# Patient Record
Sex: FEMALE | Race: OTHER | HISPANIC OR LATINO | URBAN - METROPOLITAN AREA
[De-identification: names, ages, dates, MRNs, and addresses within clinical notes are randomized per-mention and may not be internally consistent; named-entity substitution may affect disease eponyms.]

---

## 2023-01-01 ENCOUNTER — EMERGENCY (EMERGENCY)
Facility: HOSPITAL | Age: 63
LOS: 0 days | Discharge: HOME | End: 2023-12-27
Attending: EMERGENCY MEDICINE
Payer: MEDICARE

## 2023-01-01 VITALS
SYSTOLIC BLOOD PRESSURE: 120 MMHG | RESPIRATION RATE: 17 BRPM | TEMPERATURE: 98 F | DIASTOLIC BLOOD PRESSURE: 91 MMHG | OXYGEN SATURATION: 97 % | HEART RATE: 85 BPM

## 2023-01-01 VITALS
OXYGEN SATURATION: 94 % | HEART RATE: 96 BPM | SYSTOLIC BLOOD PRESSURE: 148 MMHG | DIASTOLIC BLOOD PRESSURE: 108 MMHG | RESPIRATION RATE: 20 BRPM

## 2023-01-01 DIAGNOSIS — R05.9 COUGH, UNSPECIFIED: ICD-10-CM

## 2023-01-01 DIAGNOSIS — R09.89 OTHER SPECIFIED SYMPTOMS AND SIGNS INVOLVING THE CIRCULATORY AND RESPIRATORY SYSTEMS: ICD-10-CM

## 2023-01-01 DIAGNOSIS — E78.5 HYPERLIPIDEMIA, UNSPECIFIED: ICD-10-CM

## 2023-01-01 DIAGNOSIS — G12.21 AMYOTROPHIC LATERAL SCLEROSIS: ICD-10-CM

## 2023-01-01 DIAGNOSIS — Z99.3 DEPENDENCE ON WHEELCHAIR: ICD-10-CM

## 2023-01-01 DIAGNOSIS — R11.10 VOMITING, UNSPECIFIED: ICD-10-CM

## 2023-01-01 LAB
ABO RH CONFIRMATION: SIGNIFICANT CHANGE UP
ABO RH CONFIRMATION: SIGNIFICANT CHANGE UP
ALBUMIN SERPL ELPH-MCNC: 3.9 G/DL — SIGNIFICANT CHANGE UP (ref 3.3–5)
ALBUMIN SERPL ELPH-MCNC: 3.9 G/DL — SIGNIFICANT CHANGE UP (ref 3.3–5)
ALP SERPL-CCNC: 65 U/L — SIGNIFICANT CHANGE UP (ref 40–120)
ALP SERPL-CCNC: 65 U/L — SIGNIFICANT CHANGE UP (ref 40–120)
ALT FLD-CCNC: 33 U/L — SIGNIFICANT CHANGE UP (ref 12–78)
ALT FLD-CCNC: 33 U/L — SIGNIFICANT CHANGE UP (ref 12–78)
ANION GAP SERPL CALC-SCNC: 2 MMOL/L — SIGNIFICANT CHANGE UP (ref 5–17)
ANION GAP SERPL CALC-SCNC: 2 MMOL/L — SIGNIFICANT CHANGE UP (ref 5–17)
APTT BLD: 34.1 SEC — SIGNIFICANT CHANGE UP (ref 24.5–35.6)
APTT BLD: 34.1 SEC — SIGNIFICANT CHANGE UP (ref 24.5–35.6)
AST SERPL-CCNC: 20 U/L — SIGNIFICANT CHANGE UP (ref 15–37)
AST SERPL-CCNC: 20 U/L — SIGNIFICANT CHANGE UP (ref 15–37)
BASOPHILS # BLD AUTO: 0.02 K/UL — SIGNIFICANT CHANGE UP (ref 0–0.2)
BASOPHILS # BLD AUTO: 0.02 K/UL — SIGNIFICANT CHANGE UP (ref 0–0.2)
BASOPHILS NFR BLD AUTO: 0.3 % — SIGNIFICANT CHANGE UP (ref 0–2)
BASOPHILS NFR BLD AUTO: 0.3 % — SIGNIFICANT CHANGE UP (ref 0–2)
BILIRUB SERPL-MCNC: 0.6 MG/DL — SIGNIFICANT CHANGE UP (ref 0.2–1.2)
BILIRUB SERPL-MCNC: 0.6 MG/DL — SIGNIFICANT CHANGE UP (ref 0.2–1.2)
BLD GP AB SCN SERPL QL: SIGNIFICANT CHANGE UP
BLD GP AB SCN SERPL QL: SIGNIFICANT CHANGE UP
BUN SERPL-MCNC: 21 MG/DL — SIGNIFICANT CHANGE UP (ref 7–23)
BUN SERPL-MCNC: 21 MG/DL — SIGNIFICANT CHANGE UP (ref 7–23)
CALCIUM SERPL-MCNC: 9.9 MG/DL — SIGNIFICANT CHANGE UP (ref 8.5–10.1)
CALCIUM SERPL-MCNC: 9.9 MG/DL — SIGNIFICANT CHANGE UP (ref 8.5–10.1)
CHLORIDE SERPL-SCNC: 109 MMOL/L — HIGH (ref 96–108)
CHLORIDE SERPL-SCNC: 109 MMOL/L — HIGH (ref 96–108)
CO2 SERPL-SCNC: 32 MMOL/L — HIGH (ref 22–31)
CO2 SERPL-SCNC: 32 MMOL/L — HIGH (ref 22–31)
CREAT SERPL-MCNC: 0.59 MG/DL — SIGNIFICANT CHANGE UP (ref 0.5–1.3)
CREAT SERPL-MCNC: 0.59 MG/DL — SIGNIFICANT CHANGE UP (ref 0.5–1.3)
EGFR: 102 ML/MIN/1.73M2 — SIGNIFICANT CHANGE UP
EGFR: 102 ML/MIN/1.73M2 — SIGNIFICANT CHANGE UP
EOSINOPHIL # BLD AUTO: 0.08 K/UL — SIGNIFICANT CHANGE UP (ref 0–0.5)
EOSINOPHIL # BLD AUTO: 0.08 K/UL — SIGNIFICANT CHANGE UP (ref 0–0.5)
EOSINOPHIL NFR BLD AUTO: 1.3 % — SIGNIFICANT CHANGE UP (ref 0–6)
EOSINOPHIL NFR BLD AUTO: 1.3 % — SIGNIFICANT CHANGE UP (ref 0–6)
GLUCOSE SERPL-MCNC: 105 MG/DL — HIGH (ref 70–99)
GLUCOSE SERPL-MCNC: 105 MG/DL — HIGH (ref 70–99)
HCT VFR BLD CALC: 42.1 % — SIGNIFICANT CHANGE UP (ref 34.5–45)
HCT VFR BLD CALC: 42.1 % — SIGNIFICANT CHANGE UP (ref 34.5–45)
HGB BLD-MCNC: 13.2 G/DL — SIGNIFICANT CHANGE UP (ref 11.5–15.5)
HGB BLD-MCNC: 13.2 G/DL — SIGNIFICANT CHANGE UP (ref 11.5–15.5)
IMM GRANULOCYTES NFR BLD AUTO: 0.3 % — SIGNIFICANT CHANGE UP (ref 0–0.9)
IMM GRANULOCYTES NFR BLD AUTO: 0.3 % — SIGNIFICANT CHANGE UP (ref 0–0.9)
INR BLD: 0.99 RATIO — SIGNIFICANT CHANGE UP (ref 0.85–1.18)
INR BLD: 0.99 RATIO — SIGNIFICANT CHANGE UP (ref 0.85–1.18)
LYMPHOCYTES # BLD AUTO: 1.04 K/UL — SIGNIFICANT CHANGE UP (ref 1–3.3)
LYMPHOCYTES # BLD AUTO: 1.04 K/UL — SIGNIFICANT CHANGE UP (ref 1–3.3)
LYMPHOCYTES # BLD AUTO: 16.9 % — SIGNIFICANT CHANGE UP (ref 13–44)
LYMPHOCYTES # BLD AUTO: 16.9 % — SIGNIFICANT CHANGE UP (ref 13–44)
MCHC RBC-ENTMCNC: 25.8 PG — LOW (ref 27–34)
MCHC RBC-ENTMCNC: 25.8 PG — LOW (ref 27–34)
MCHC RBC-ENTMCNC: 31.4 GM/DL — LOW (ref 32–36)
MCHC RBC-ENTMCNC: 31.4 GM/DL — LOW (ref 32–36)
MCV RBC AUTO: 82.2 FL — SIGNIFICANT CHANGE UP (ref 80–100)
MCV RBC AUTO: 82.2 FL — SIGNIFICANT CHANGE UP (ref 80–100)
MONOCYTES # BLD AUTO: 0.42 K/UL — SIGNIFICANT CHANGE UP (ref 0–0.9)
MONOCYTES # BLD AUTO: 0.42 K/UL — SIGNIFICANT CHANGE UP (ref 0–0.9)
MONOCYTES NFR BLD AUTO: 6.8 % — SIGNIFICANT CHANGE UP (ref 2–14)
MONOCYTES NFR BLD AUTO: 6.8 % — SIGNIFICANT CHANGE UP (ref 2–14)
NEUTROPHILS # BLD AUTO: 4.58 K/UL — SIGNIFICANT CHANGE UP (ref 1.8–7.4)
NEUTROPHILS # BLD AUTO: 4.58 K/UL — SIGNIFICANT CHANGE UP (ref 1.8–7.4)
NEUTROPHILS NFR BLD AUTO: 74.4 % — SIGNIFICANT CHANGE UP (ref 43–77)
NEUTROPHILS NFR BLD AUTO: 74.4 % — SIGNIFICANT CHANGE UP (ref 43–77)
PLATELET # BLD AUTO: 236 K/UL — SIGNIFICANT CHANGE UP (ref 150–400)
PLATELET # BLD AUTO: 236 K/UL — SIGNIFICANT CHANGE UP (ref 150–400)
POTASSIUM SERPL-MCNC: 4 MMOL/L — SIGNIFICANT CHANGE UP (ref 3.5–5.3)
POTASSIUM SERPL-MCNC: 4 MMOL/L — SIGNIFICANT CHANGE UP (ref 3.5–5.3)
POTASSIUM SERPL-SCNC: 4 MMOL/L — SIGNIFICANT CHANGE UP (ref 3.5–5.3)
POTASSIUM SERPL-SCNC: 4 MMOL/L — SIGNIFICANT CHANGE UP (ref 3.5–5.3)
PROT SERPL-MCNC: 7.6 GM/DL — SIGNIFICANT CHANGE UP (ref 6–8.3)
PROT SERPL-MCNC: 7.6 GM/DL — SIGNIFICANT CHANGE UP (ref 6–8.3)
PROTHROM AB SERPL-ACNC: 11.2 SEC — SIGNIFICANT CHANGE UP (ref 9.5–13)
PROTHROM AB SERPL-ACNC: 11.2 SEC — SIGNIFICANT CHANGE UP (ref 9.5–13)
RBC # BLD: 5.12 M/UL — SIGNIFICANT CHANGE UP (ref 3.8–5.2)
RBC # BLD: 5.12 M/UL — SIGNIFICANT CHANGE UP (ref 3.8–5.2)
RBC # FLD: 14.5 % — SIGNIFICANT CHANGE UP (ref 10.3–14.5)
RBC # FLD: 14.5 % — SIGNIFICANT CHANGE UP (ref 10.3–14.5)
SODIUM SERPL-SCNC: 142 MMOL/L — SIGNIFICANT CHANGE UP (ref 135–145)
SODIUM SERPL-SCNC: 142 MMOL/L — SIGNIFICANT CHANGE UP (ref 135–145)
WBC # BLD: 6.16 K/UL — SIGNIFICANT CHANGE UP (ref 3.8–10.5)
WBC # BLD: 6.16 K/UL — SIGNIFICANT CHANGE UP (ref 3.8–10.5)
WBC # FLD AUTO: 6.16 K/UL — SIGNIFICANT CHANGE UP (ref 3.8–10.5)
WBC # FLD AUTO: 6.16 K/UL — SIGNIFICANT CHANGE UP (ref 3.8–10.5)

## 2023-01-01 PROCEDURE — 70360 X-RAY EXAM OF NECK: CPT | Mod: 26

## 2023-01-01 PROCEDURE — 80053 COMPREHEN METABOLIC PANEL: CPT

## 2023-01-01 PROCEDURE — 71045 X-RAY EXAM CHEST 1 VIEW: CPT | Mod: 26

## 2023-01-01 PROCEDURE — 86901 BLOOD TYPING SEROLOGIC RH(D): CPT

## 2023-01-01 PROCEDURE — 85610 PROTHROMBIN TIME: CPT

## 2023-01-01 PROCEDURE — 85730 THROMBOPLASTIN TIME PARTIAL: CPT

## 2023-01-01 PROCEDURE — 71045 X-RAY EXAM CHEST 1 VIEW: CPT

## 2023-01-01 PROCEDURE — 86850 RBC ANTIBODY SCREEN: CPT

## 2023-01-01 PROCEDURE — 86900 BLOOD TYPING SEROLOGIC ABO: CPT

## 2023-01-01 PROCEDURE — 99285 EMERGENCY DEPT VISIT HI MDM: CPT

## 2023-01-01 PROCEDURE — 99284 EMERGENCY DEPT VISIT MOD MDM: CPT | Mod: 25

## 2023-01-01 PROCEDURE — 70490 CT SOFT TISSUE NECK W/O DYE: CPT | Mod: MA

## 2023-01-01 PROCEDURE — 70490 CT SOFT TISSUE NECK W/O DYE: CPT | Mod: 26,MA

## 2023-01-01 PROCEDURE — 85025 COMPLETE CBC W/AUTO DIFF WBC: CPT

## 2023-01-01 PROCEDURE — 36415 COLL VENOUS BLD VENIPUNCTURE: CPT

## 2023-01-01 PROCEDURE — 70360 X-RAY EXAM OF NECK: CPT

## 2023-01-01 RX ORDER — ONDANSETRON 8 MG/1
4 TABLET, FILM COATED ORAL ONCE
Refills: 0 | Status: COMPLETED | OUTPATIENT
Start: 2023-01-01 | End: 2023-01-01

## 2023-12-27 NOTE — ED PROVIDER NOTE - CLINICAL SUMMARY MEDICAL DECISION MAKING FREE TEXT BOX
64 yo female with a PMH of hld, ALS presents with choking episode approx 1 hour PTA. Pt has coughed and vomited after the episode but states she still feels a sensation in her throat. Has not have anything to eat or drink after.   Will check XR, labs, and meds, reeval. -Slava Alarcon PA-C 64 yo female with a PMH of hld, ALS presents with choking episode approx 1 hour PTA. Pt has coughed and vomited after the episode but states she still feels a sensation in her throat. Has not have anything to eat or drink after.   Will check XR, labs, and meds, reeval. -Slava Alarcon PA-C    In summary this is a 63-year-old female with a past medical history of ALS who presents with chief complaint of choking episode. Vital signs are within normal limits on arrival. Chest x-ray was performed and independently interpreted by myself to reveal atelectasis soft tissue x-ray of the neck followed by CT scan of the neck demonstrated no abnormalities at this time. The patient tolerated po well in the department without difficulty. Patient requested social work evaluation which was completed in the Emergency Department. Patient was discharged home in good condition. Recommend follow up with her ALS Clinic and her PCP. Recommended cough assist machine as patient had weak cough secondary to ALS. Strict return precautions given for any worsening. Patient verbalized understanding and agreed to plan at this time. Pritesh Coley D.O. 62 yo female with a PMH of hld, ALS presents with choking episode approx 1 hour PTA. Pt has coughed and vomited after the episode but states she still feels a sensation in her throat. Has not have anything to eat or drink after.   Will check XR, labs, and meds, reeval. -Slava Alarcon PA-C    In summary this is a 63-year-old female with a past medical history of ALS who presents with chief complaint of choking episode. Vital signs are within normal limits on arrival. Chest x-ray was performed and independently interpreted by myself to reveal atelectasis soft tissue x-ray of the neck followed by CT scan of the neck demonstrated no abnormalities at this time. The patient tolerated po well in the department without difficulty. Patient requested social work evaluation which was completed in the Emergency Department. Patient was discharged home in good condition. Recommend follow up with her ALS Clinic and her PCP. Recommended cough assist machine as patient had weak cough secondary to ALS. Strict return precautions given for any worsening. Patient verbalized understanding and agreed to plan at this time. Pritesh Coley D.O.

## 2023-12-27 NOTE — ED PROVIDER NOTE - OBJECTIVE STATEMENT
64 yo female with a PMH of hld, ALS (wheelchair bound) presents with choking episode approx 1 hour PTA> Pt was eating left over steak when she started choking. Pt was coughing and had vomiting episodes but states she still feels pressure in her throat. Denies cp, sob, abd pain. 62 yo female with a PMH of hld, ALS (wheelchair bound) presents with choking episode approx 1 hour PTA> Pt was eating left over steak when she started choking. Pt was coughing and had vomiting episodes but states she still feels pressure in her throat. Denies cp, sob, abd pain.

## 2023-12-27 NOTE — ED PROVIDER NOTE - PROGRESS NOTE DETAILS
Pt feeling better. Sensation has resolved. CT without evidence of food impaction. PO trail given and tolerated. Pt wanted to speak with SW. Lorraine came and gave her information for home help. WIll d/c home. p

## 2023-12-27 NOTE — ED ADULT TRIAGE NOTE - CHIEF COMPLAINT QUOTE
BIBA s/o episode of choking on steak. feels like there is still something stuck. has been vomiting o2 sat 94 on room air.

## 2023-12-27 NOTE — ED PROVIDER NOTE - PATIENT PORTAL LINK FT
You can access the FollowMyHealth Patient Portal offered by [facility] by registering at the following website: [email or web address]. By joining [employer]’s FollowMyHealth portal, you will also be able to view your health information using other applications (apps) compatible with our system. You can access the FollowMyHealth Patient Portal offered by [facility] by registering at the following website: [email or web address]. By joining [employer]’s FollowMyHealth portal, you will also be able to view your health information using other applications (apps) compatible with our system.

## 2023-12-27 NOTE — ED ADULT NURSE NOTE - NSFALLRISKINTERV_ED_ALL_ED
Assistance OOB with selected safe patient handling equipment if applicable/Assistance with ambulation/Communicate fall risk and risk factors to all staff, patient, and family/Monitor gait and stability/Provide patient with walking aids/Provide visual cue: yellow wristband, yellow gown, etc/Reinforce activity limits and safety measures with patient and family/Call bell, personal items and telephone in reach/Instruct patient to call for assistance before getting out of bed/chair/stretcher/Non-slip footwear applied when patient is off stretcher/[place] to call system/Physically safe environment - no spills, clutter or unnecessary equipment/Purposeful Proactive Rounding/Room/bathroom lighting operational, light cord in reach Assistance OOB with selected safe patient handling equipment if applicable/Assistance with ambulation/Communicate fall risk and risk factors to all staff, patient, and family/Monitor gait and stability/Provide patient with walking aids/Provide visual cue: yellow wristband, yellow gown, etc/Reinforce activity limits and safety measures with patient and family/Call bell, personal items and telephone in reach/Instruct patient to call for assistance before getting out of bed/chair/stretcher/Non-slip footwear applied when patient is off stretcher/[place] to call system/Physically safe environment - no spills, clutter or unnecessary equipment/Purposeful Proactive Rounding/Room/bathroom lighting operational, light cord in reach

## 2023-12-27 NOTE — ED ADULT NURSE NOTE - OBJECTIVE STATEMENT
Pt arrived to ED s/p choking. Pt has a history of ALS diagnosed in January, Pt states she has been having some choking episodes lately and today while she was trying to eat steak she started choking and coughing. Pt states she began throwing up from the coughing and has been throwing up since. Pt states she still feels some pressure in her throat but no difficulties with breathing. Pt AOx4, denies chest pain, sob, or fevers.

## 2023-12-27 NOTE — ED PROVIDER NOTE - NSFOLLOWUPINSTRUCTIONS_ED_ALL_ED_FT
Follow up with your primary care doctor.   Make sure you chew your food fully and avoid swallowing large or hard pieces.   Drink plenty of fluids.    Return to the Emergency Department for worsening or persistent symptoms, and/or ANY NEW OR CONCERNING SYMPTOMS. If you have issues obtaining follow up, please call: [phone] ([phone] or [phone]  to obtain a doctor or specialist who takes your insurance in your area. Follow up with your primary care doctor.   Make sure you chew your food fully and avoid swallowing large or hard pieces.   Drink plenty of fluids.    Return to the Emergency Department for worsening or persistent symptoms, and/or ANY NEW OR CONCERNING SYMPTOMS. If you have issues obtaining follow up, please call: [phone] ([phone] or [phone]  to obtain a doctor or specialist who takes your insurance in your area.

## 2023-12-27 NOTE — ED PROVIDER NOTE - CONSTITUTIONAL, MLM
Tearful appearing, awake, alert, oriented to person, place, time/situation and in no apparent distress. normal...

## 2023-12-27 NOTE — ED PROVIDER NOTE - NS ED ATTENDING STATEMENT MOD
This was a shared visit with the SAMUEL. I reviewed and verified the documentation and independently performed the documented:

## 2024-01-01 ENCOUNTER — INPATIENT (INPATIENT)
Facility: HOSPITAL | Age: 64
LOS: 11 days | DRG: 102 | End: 2024-05-10
Attending: INTERNAL MEDICINE | Admitting: INTERNAL MEDICINE
Payer: MEDICARE

## 2024-01-01 ENCOUNTER — INPATIENT (INPATIENT)
Facility: HOSPITAL | Age: 64
LOS: 7 days | Discharge: NURSING, REHAB OR LONG-TERM CARE | DRG: 56 | End: 2024-03-20
Attending: HOSPITALIST | Admitting: FAMILY MEDICINE
Payer: MEDICARE

## 2024-01-01 ENCOUNTER — EMERGENCY (EMERGENCY)
Facility: HOSPITAL | Age: 64
LOS: 0 days | Discharge: HOME | End: 2024-03-08
Attending: EMERGENCY MEDICINE
Payer: MEDICARE

## 2024-01-01 VITALS
DIASTOLIC BLOOD PRESSURE: 80 MMHG | HEIGHT: 65 IN | OXYGEN SATURATION: 97 % | SYSTOLIC BLOOD PRESSURE: 134 MMHG | TEMPERATURE: 98 F | HEART RATE: 96 BPM | RESPIRATION RATE: 18 BRPM | WEIGHT: 145.06 LBS

## 2024-01-01 VITALS
HEART RATE: 100 BPM | SYSTOLIC BLOOD PRESSURE: 117 MMHG | TEMPERATURE: 99 F | RESPIRATION RATE: 18 BRPM | DIASTOLIC BLOOD PRESSURE: 88 MMHG | OXYGEN SATURATION: 96 %

## 2024-01-01 VITALS
HEART RATE: 98 BPM | HEIGHT: 65 IN | SYSTOLIC BLOOD PRESSURE: 123 MMHG | TEMPERATURE: 98 F | OXYGEN SATURATION: 99 % | DIASTOLIC BLOOD PRESSURE: 89 MMHG | RESPIRATION RATE: 18 BRPM | WEIGHT: 160.06 LBS

## 2024-01-01 VITALS
TEMPERATURE: 98 F | SYSTOLIC BLOOD PRESSURE: 109 MMHG | HEART RATE: 87 BPM | OXYGEN SATURATION: 94 % | DIASTOLIC BLOOD PRESSURE: 86 MMHG | RESPIRATION RATE: 18 BRPM

## 2024-01-01 VITALS
HEIGHT: 65 IN | OXYGEN SATURATION: 90 % | TEMPERATURE: 98 F | RESPIRATION RATE: 18 BRPM | DIASTOLIC BLOOD PRESSURE: 94 MMHG | WEIGHT: 160.06 LBS | SYSTOLIC BLOOD PRESSURE: 120 MMHG | HEART RATE: 90 BPM

## 2024-01-01 VITALS — TEMPERATURE: 101 F

## 2024-01-01 DIAGNOSIS — R07.89 OTHER CHEST PAIN: ICD-10-CM

## 2024-01-01 DIAGNOSIS — F41.9 ANXIETY DISORDER, UNSPECIFIED: ICD-10-CM

## 2024-01-01 DIAGNOSIS — Z66 DO NOT RESUSCITATE: ICD-10-CM

## 2024-01-01 DIAGNOSIS — J96.02 ACUTE RESPIRATORY FAILURE WITH HYPERCAPNIA: ICD-10-CM

## 2024-01-01 DIAGNOSIS — Z99.3 DEPENDENCE ON WHEELCHAIR: ICD-10-CM

## 2024-01-01 DIAGNOSIS — Z20.822 CONTACT WITH AND (SUSPECTED) EXPOSURE TO COVID-19: ICD-10-CM

## 2024-01-01 DIAGNOSIS — E87.29 OTHER ACIDOSIS: ICD-10-CM

## 2024-01-01 DIAGNOSIS — E78.5 HYPERLIPIDEMIA, UNSPECIFIED: ICD-10-CM

## 2024-01-01 DIAGNOSIS — R53.1 WEAKNESS: ICD-10-CM

## 2024-01-01 DIAGNOSIS — G12.21 AMYOTROPHIC LATERAL SCLEROSIS: ICD-10-CM
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
## 2024-01-01 DIAGNOSIS — Z91.81 HISTORY OF FALLING: ICD-10-CM

## 2024-01-01 DIAGNOSIS — G47.00 INSOMNIA, UNSPECIFIED: ICD-10-CM
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
## 2024-01-01 DIAGNOSIS — E87.3 ALKALOSIS: ICD-10-CM

## 2024-01-01 DIAGNOSIS — J98.4 OTHER DISORDERS OF LUNG: ICD-10-CM

## 2024-01-01 DIAGNOSIS — M54.2 CERVICALGIA: ICD-10-CM

## 2024-01-01 DIAGNOSIS — F32.9 MAJOR DEPRESSIVE DISORDER, SINGLE EPISODE, UNSPECIFIED: ICD-10-CM

## 2024-01-01 DIAGNOSIS — J96.21 ACUTE AND CHRONIC RESPIRATORY FAILURE WITH HYPOXIA: ICD-10-CM

## 2024-01-01 DIAGNOSIS — J96.22 ACUTE AND CHRONIC RESPIRATORY FAILURE WITH HYPERCAPNIA: ICD-10-CM

## 2024-01-01 DIAGNOSIS — R11.0 NAUSEA: ICD-10-CM

## 2024-01-01 DIAGNOSIS — Z75.1 PERSON AWAITING ADMISSION TO ADEQUATE FACILITY ELSEWHERE: ICD-10-CM

## 2024-01-01 DIAGNOSIS — G93.41 METABOLIC ENCEPHALOPATHY: ICD-10-CM

## 2024-01-01 LAB
ALBUMIN SERPL ELPH-MCNC: 3.2 G/DL — LOW (ref 3.3–5)
ALBUMIN SERPL ELPH-MCNC: 3.4 G/DL — SIGNIFICANT CHANGE UP (ref 3.3–5)
ALBUMIN SERPL ELPH-MCNC: 3.5 G/DL — SIGNIFICANT CHANGE UP (ref 3.3–5)
ALBUMIN SERPL ELPH-MCNC: 3.6 G/DL — SIGNIFICANT CHANGE UP (ref 3.3–5)
ALP SERPL-CCNC: 53 U/L — SIGNIFICANT CHANGE UP (ref 40–120)
ALP SERPL-CCNC: 54 U/L — SIGNIFICANT CHANGE UP (ref 40–120)
ALP SERPL-CCNC: 61 U/L — SIGNIFICANT CHANGE UP (ref 40–120)
ALP SERPL-CCNC: 66 U/L — SIGNIFICANT CHANGE UP (ref 40–120)
ALT FLD-CCNC: 36 U/L — SIGNIFICANT CHANGE UP (ref 12–78)
ALT FLD-CCNC: 37 U/L — SIGNIFICANT CHANGE UP (ref 12–78)
ALT FLD-CCNC: 39 U/L — SIGNIFICANT CHANGE UP (ref 12–78)
ALT FLD-CCNC: 42 U/L — SIGNIFICANT CHANGE UP (ref 12–78)
AMMONIA BLD-MCNC: <10 UMOL/L — LOW (ref 11–32)
ANION GAP SERPL CALC-SCNC: 0 MMOL/L — LOW (ref 5–17)
ANION GAP SERPL CALC-SCNC: 0 MMOL/L — LOW (ref 5–17)
ANION GAP SERPL CALC-SCNC: 1 MMOL/L — LOW (ref 5–17)
ANION GAP SERPL CALC-SCNC: 2 MMOL/L — LOW (ref 5–17)
ANION GAP SERPL CALC-SCNC: 3 MMOL/L — LOW (ref 5–17)
ANION GAP SERPL CALC-SCNC: 6 MMOL/L — SIGNIFICANT CHANGE UP (ref 5–17)
APTT BLD: 25.1 SEC — SIGNIFICANT CHANGE UP (ref 24.5–35.6)
AST SERPL-CCNC: 14 U/L — LOW (ref 15–37)
AST SERPL-CCNC: 17 U/L — SIGNIFICANT CHANGE UP (ref 15–37)
AST SERPL-CCNC: 18 U/L — SIGNIFICANT CHANGE UP (ref 15–37)
AST SERPL-CCNC: 19 U/L — SIGNIFICANT CHANGE UP (ref 15–37)
BASE EXCESS BLDA CALC-SCNC: 10.8 MMOL/L — HIGH (ref -2–3)
BASE EXCESS BLDA CALC-SCNC: 11.7 MMOL/L — HIGH (ref -2–3)
BASE EXCESS BLDA CALC-SCNC: 12.1 MMOL/L — HIGH (ref -2–3)
BASE EXCESS BLDA CALC-SCNC: 12.2 MMOL/L — HIGH (ref -2–3)
BASE EXCESS BLDA CALC-SCNC: 14 MMOL/L — HIGH (ref -2–3)
BASE EXCESS BLDA CALC-SCNC: 15.8 MMOL/L — HIGH (ref -2–3)
BASE EXCESS BLDA CALC-SCNC: 8.4 MMOL/L — HIGH (ref -2–3)
BASE EXCESS BLDV CALC-SCNC: 10.5 MMOL/L — HIGH (ref -2–3)
BASE EXCESS BLDV CALC-SCNC: 16.1 MMOL/L — HIGH (ref -2–3)
BASE EXCESS BLDV CALC-SCNC: 8.3 MMOL/L — HIGH (ref -2–3)
BASE EXCESS BLDV CALC-SCNC: 9.4 MMOL/L — HIGH (ref -2–3)
BASOPHILS # BLD AUTO: 0.01 K/UL — SIGNIFICANT CHANGE UP (ref 0–0.2)
BASOPHILS # BLD AUTO: 0.01 K/UL — SIGNIFICANT CHANGE UP (ref 0–0.2)
BASOPHILS # BLD AUTO: 0.02 K/UL — SIGNIFICANT CHANGE UP (ref 0–0.2)
BASOPHILS # BLD AUTO: 0.02 K/UL — SIGNIFICANT CHANGE UP (ref 0–0.2)
BASOPHILS # BLD AUTO: 0.03 K/UL — SIGNIFICANT CHANGE UP (ref 0–0.2)
BASOPHILS # BLD AUTO: 0.03 K/UL — SIGNIFICANT CHANGE UP (ref 0–0.2)
BASOPHILS NFR BLD AUTO: 0.1 % — SIGNIFICANT CHANGE UP (ref 0–2)
BASOPHILS NFR BLD AUTO: 0.1 % — SIGNIFICANT CHANGE UP (ref 0–2)
BASOPHILS NFR BLD AUTO: 0.2 % — SIGNIFICANT CHANGE UP (ref 0–2)
BASOPHILS NFR BLD AUTO: 0.3 % — SIGNIFICANT CHANGE UP (ref 0–2)
BILIRUB DIRECT SERPL-MCNC: 0.1 MG/DL — SIGNIFICANT CHANGE UP (ref 0–0.3)
BILIRUB INDIRECT FLD-MCNC: 0.4 MG/DL — SIGNIFICANT CHANGE UP (ref 0.2–1)
BILIRUB SERPL-MCNC: 0.3 MG/DL — SIGNIFICANT CHANGE UP (ref 0.2–1.2)
BILIRUB SERPL-MCNC: 0.3 MG/DL — SIGNIFICANT CHANGE UP (ref 0.2–1.2)
BILIRUB SERPL-MCNC: 0.4 MG/DL — SIGNIFICANT CHANGE UP (ref 0.2–1.2)
BILIRUB SERPL-MCNC: 0.5 MG/DL — SIGNIFICANT CHANGE UP (ref 0.2–1.2)
BLOOD GAS COMMENTS ARTERIAL: SIGNIFICANT CHANGE UP
BUN SERPL-MCNC: 11 MG/DL — SIGNIFICANT CHANGE UP (ref 7–23)
BUN SERPL-MCNC: 11 MG/DL — SIGNIFICANT CHANGE UP (ref 7–23)
BUN SERPL-MCNC: 12 MG/DL — SIGNIFICANT CHANGE UP (ref 7–23)
BUN SERPL-MCNC: 12 MG/DL — SIGNIFICANT CHANGE UP (ref 7–23)
BUN SERPL-MCNC: 13 MG/DL — SIGNIFICANT CHANGE UP (ref 7–23)
BUN SERPL-MCNC: 13 MG/DL — SIGNIFICANT CHANGE UP (ref 7–23)
BUN SERPL-MCNC: 14 MG/DL — SIGNIFICANT CHANGE UP (ref 7–23)
BUN SERPL-MCNC: 17 MG/DL — SIGNIFICANT CHANGE UP (ref 7–23)
BUN SERPL-MCNC: 19 MG/DL — SIGNIFICANT CHANGE UP (ref 7–23)
BUN SERPL-MCNC: 25 MG/DL — HIGH (ref 7–23)
BUN SERPL-MCNC: 28 MG/DL — HIGH (ref 7–23)
BUN SERPL-MCNC: 28 MG/DL — HIGH (ref 7–23)
CALCIUM SERPL-MCNC: 10 MG/DL — SIGNIFICANT CHANGE UP (ref 8.5–10.1)
CALCIUM SERPL-MCNC: 10.3 MG/DL — HIGH (ref 8.5–10.1)
CALCIUM SERPL-MCNC: 9.1 MG/DL — SIGNIFICANT CHANGE UP (ref 8.5–10.1)
CALCIUM SERPL-MCNC: 9.2 MG/DL — SIGNIFICANT CHANGE UP (ref 8.5–10.1)
CALCIUM SERPL-MCNC: 9.3 MG/DL — SIGNIFICANT CHANGE UP (ref 8.5–10.1)
CALCIUM SERPL-MCNC: 9.5 MG/DL — SIGNIFICANT CHANGE UP (ref 8.5–10.1)
CALCIUM SERPL-MCNC: 9.5 MG/DL — SIGNIFICANT CHANGE UP (ref 8.5–10.1)
CALCIUM SERPL-MCNC: 9.6 MG/DL — SIGNIFICANT CHANGE UP (ref 8.5–10.1)
CALCIUM SERPL-MCNC: 9.7 MG/DL — SIGNIFICANT CHANGE UP (ref 8.5–10.1)
CHLORIDE SERPL-SCNC: 100 MMOL/L — SIGNIFICANT CHANGE UP (ref 96–108)
CHLORIDE SERPL-SCNC: 100 MMOL/L — SIGNIFICANT CHANGE UP (ref 96–108)
CHLORIDE SERPL-SCNC: 101 MMOL/L — SIGNIFICANT CHANGE UP (ref 96–108)
CHLORIDE SERPL-SCNC: 101 MMOL/L — SIGNIFICANT CHANGE UP (ref 96–108)
CHLORIDE SERPL-SCNC: 102 MMOL/L — SIGNIFICANT CHANGE UP (ref 96–108)
CHLORIDE SERPL-SCNC: 102 MMOL/L — SIGNIFICANT CHANGE UP (ref 96–108)
CHLORIDE SERPL-SCNC: 103 MMOL/L — SIGNIFICANT CHANGE UP (ref 96–108)
CHLORIDE SERPL-SCNC: 104 MMOL/L — SIGNIFICANT CHANGE UP (ref 96–108)
CHLORIDE SERPL-SCNC: 105 MMOL/L — SIGNIFICANT CHANGE UP (ref 96–108)
CHLORIDE SERPL-SCNC: 106 MMOL/L — SIGNIFICANT CHANGE UP (ref 96–108)
CHLORIDE SERPL-SCNC: 106 MMOL/L — SIGNIFICANT CHANGE UP (ref 96–108)
CHLORIDE SERPL-SCNC: 99 MMOL/L — SIGNIFICANT CHANGE UP (ref 96–108)
CO2 SERPL-SCNC: 34 MMOL/L — HIGH (ref 22–31)
CO2 SERPL-SCNC: 35 MMOL/L — HIGH (ref 22–31)
CO2 SERPL-SCNC: 35 MMOL/L — HIGH (ref 22–31)
CO2 SERPL-SCNC: 36 MMOL/L — HIGH (ref 22–31)
CO2 SERPL-SCNC: 36 MMOL/L — HIGH (ref 22–31)
CO2 SERPL-SCNC: 37 MMOL/L — HIGH (ref 22–31)
CO2 SERPL-SCNC: 38 MMOL/L — HIGH (ref 22–31)
CO2 SERPL-SCNC: 39 MMOL/L — HIGH (ref 22–31)
CO2 SERPL-SCNC: 41 MMOL/L — HIGH (ref 22–31)
CO2 SERPL-SCNC: 42 MMOL/L — HIGH (ref 22–31)
CREAT SERPL-MCNC: 0.17 MG/DL — LOW (ref 0.5–1.3)
CREAT SERPL-MCNC: 0.17 MG/DL — LOW (ref 0.5–1.3)
CREAT SERPL-MCNC: 0.18 MG/DL — LOW (ref 0.5–1.3)
CREAT SERPL-MCNC: 0.25 MG/DL — LOW (ref 0.5–1.3)
CREAT SERPL-MCNC: 0.31 MG/DL — LOW (ref 0.5–1.3)
CREAT SERPL-MCNC: 0.33 MG/DL — LOW (ref 0.5–1.3)
CREAT SERPL-MCNC: 0.35 MG/DL — LOW (ref 0.5–1.3)
CREAT SERPL-MCNC: 0.48 MG/DL — LOW (ref 0.5–1.3)
CREAT SERPL-MCNC: 0.6 MG/DL — SIGNIFICANT CHANGE UP (ref 0.5–1.3)
CREAT SERPL-MCNC: <0.15 MG/DL — LOW (ref 0.5–1.3)
D DIMER BLD IA.RAPID-MCNC: 184 NG/ML DDU — SIGNIFICANT CHANGE UP
EGFR: 102 ML/MIN/1.73M2 — SIGNIFICANT CHANGE UP
EGFR: 108 ML/MIN/1.73M2 — SIGNIFICANT CHANGE UP
EGFR: 116 ML/MIN/1.73M2 — SIGNIFICANT CHANGE UP
EGFR: 118 ML/MIN/1.73M2 — SIGNIFICANT CHANGE UP
EGFR: 120 ML/MIN/1.73M2 — SIGNIFICANT CHANGE UP
EGFR: 126 ML/MIN/1.73M2 — SIGNIFICANT CHANGE UP
EGFR: 136 ML/MIN/1.73M2 — SIGNIFICANT CHANGE UP
EGFR: 138 ML/MIN/1.73M2 — SIGNIFICANT CHANGE UP
EGFR: 138 ML/MIN/1.73M2 — SIGNIFICANT CHANGE UP
EGFR: 143 ML/MIN/1.73M2 — SIGNIFICANT CHANGE UP
EOSINOPHIL # BLD AUTO: 0 K/UL — SIGNIFICANT CHANGE UP (ref 0–0.5)
EOSINOPHIL # BLD AUTO: 0 K/UL — SIGNIFICANT CHANGE UP (ref 0–0.5)
EOSINOPHIL # BLD AUTO: 0.01 K/UL — SIGNIFICANT CHANGE UP (ref 0–0.5)
EOSINOPHIL # BLD AUTO: 0.04 K/UL — SIGNIFICANT CHANGE UP (ref 0–0.5)
EOSINOPHIL # BLD AUTO: 0.06 K/UL — SIGNIFICANT CHANGE UP (ref 0–0.5)
EOSINOPHIL # BLD AUTO: 0.09 K/UL — SIGNIFICANT CHANGE UP (ref 0–0.5)
EOSINOPHIL NFR BLD AUTO: 0 % — SIGNIFICANT CHANGE UP (ref 0–6)
EOSINOPHIL NFR BLD AUTO: 0 % — SIGNIFICANT CHANGE UP (ref 0–6)
EOSINOPHIL NFR BLD AUTO: 0.1 % — SIGNIFICANT CHANGE UP (ref 0–6)
EOSINOPHIL NFR BLD AUTO: 0.5 % — SIGNIFICANT CHANGE UP (ref 0–6)
EOSINOPHIL NFR BLD AUTO: 0.7 % — SIGNIFICANT CHANGE UP (ref 0–6)
EOSINOPHIL NFR BLD AUTO: 1 % — SIGNIFICANT CHANGE UP (ref 0–6)
FLUAV AG NPH QL: SIGNIFICANT CHANGE UP
FLUBV AG NPH QL: SIGNIFICANT CHANGE UP
GAS PNL BLDA: SIGNIFICANT CHANGE UP
GAS PNL BLDV: SIGNIFICANT CHANGE UP
GAS PNL BLDV: SIGNIFICANT CHANGE UP
GLUCOSE SERPL-MCNC: 102 MG/DL — HIGH (ref 70–99)
GLUCOSE SERPL-MCNC: 113 MG/DL — HIGH (ref 70–99)
GLUCOSE SERPL-MCNC: 115 MG/DL — HIGH (ref 70–99)
GLUCOSE SERPL-MCNC: 116 MG/DL — HIGH (ref 70–99)
GLUCOSE SERPL-MCNC: 117 MG/DL — HIGH (ref 70–99)
GLUCOSE SERPL-MCNC: 119 MG/DL — HIGH (ref 70–99)
GLUCOSE SERPL-MCNC: 123 MG/DL — HIGH (ref 70–99)
GLUCOSE SERPL-MCNC: 125 MG/DL — HIGH (ref 70–99)
GLUCOSE SERPL-MCNC: 127 MG/DL — HIGH (ref 70–99)
GLUCOSE SERPL-MCNC: 129 MG/DL — HIGH (ref 70–99)
GLUCOSE SERPL-MCNC: 136 MG/DL — HIGH (ref 70–99)
GLUCOSE SERPL-MCNC: 97 MG/DL — SIGNIFICANT CHANGE UP (ref 70–99)
HCO3 BLDA-SCNC: 38 MMOL/L — HIGH (ref 21–28)
HCO3 BLDA-SCNC: 42 MMOL/L — HIGH (ref 21–28)
HCO3 BLDA-SCNC: 42 MMOL/L — HIGH (ref 21–28)
HCO3 BLDA-SCNC: 44 MMOL/L — HIGH (ref 21–28)
HCO3 BLDA-SCNC: 45 MMOL/L — CRITICAL HIGH (ref 21–28)
HCO3 BLDV-SCNC: 35 MMOL/L — HIGH (ref 22–29)
HCO3 BLDV-SCNC: 39 MMOL/L — HIGH (ref 22–29)
HCO3 BLDV-SCNC: 39 MMOL/L — HIGH (ref 22–29)
HCO3 BLDV-SCNC: 46 MMOL/L — HIGH (ref 22–29)
HCT VFR BLD CALC: 36.8 % — SIGNIFICANT CHANGE UP (ref 34.5–45)
HCT VFR BLD CALC: 39 % — SIGNIFICANT CHANGE UP (ref 34.5–45)
HCT VFR BLD CALC: 39.5 % — SIGNIFICANT CHANGE UP (ref 34.5–45)
HCT VFR BLD CALC: 40.4 % — SIGNIFICANT CHANGE UP (ref 34.5–45)
HCT VFR BLD CALC: 40.6 % — SIGNIFICANT CHANGE UP (ref 34.5–45)
HCT VFR BLD CALC: 40.8 % — SIGNIFICANT CHANGE UP (ref 34.5–45)
HCT VFR BLD CALC: 44.1 % — SIGNIFICANT CHANGE UP (ref 34.5–45)
HCT VFR BLD CALC: 44.2 % — SIGNIFICANT CHANGE UP (ref 34.5–45)
HCT VFR BLD CALC: 44.4 % — SIGNIFICANT CHANGE UP (ref 34.5–45)
HCT VFR BLD CALC: 44.6 % — SIGNIFICANT CHANGE UP (ref 34.5–45)
HCV AB S/CO SERPL IA: 0.11 S/CO — SIGNIFICANT CHANGE UP (ref 0–0.99)
HCV AB SERPL-IMP: SIGNIFICANT CHANGE UP
HGB BLD-MCNC: 11 G/DL — LOW (ref 11.5–15.5)
HGB BLD-MCNC: 11.1 G/DL — LOW (ref 11.5–15.5)
HGB BLD-MCNC: 11.2 G/DL — LOW (ref 11.5–15.5)
HGB BLD-MCNC: 11.3 G/DL — LOW (ref 11.5–15.5)
HGB BLD-MCNC: 11.4 G/DL — LOW (ref 11.5–15.5)
HGB BLD-MCNC: 11.6 G/DL — SIGNIFICANT CHANGE UP (ref 11.5–15.5)
HGB BLD-MCNC: 12.8 G/DL — SIGNIFICANT CHANGE UP (ref 11.5–15.5)
HGB BLD-MCNC: 13.3 G/DL — SIGNIFICANT CHANGE UP (ref 11.5–15.5)
HGB BLD-MCNC: 13.5 G/DL — SIGNIFICANT CHANGE UP (ref 11.5–15.5)
HGB BLD-MCNC: 13.6 G/DL — SIGNIFICANT CHANGE UP (ref 11.5–15.5)
HOROWITZ INDEX BLDA+IHG-RTO: 28 — SIGNIFICANT CHANGE UP
HOROWITZ INDEX BLDA+IHG-RTO: 35 — SIGNIFICANT CHANGE UP
HOROWITZ INDEX BLDA+IHG-RTO: 35 — SIGNIFICANT CHANGE UP
IMM GRANULOCYTES NFR BLD AUTO: 0.3 % — SIGNIFICANT CHANGE UP (ref 0–0.9)
IMM GRANULOCYTES NFR BLD AUTO: 0.3 % — SIGNIFICANT CHANGE UP (ref 0–0.9)
IMM GRANULOCYTES NFR BLD AUTO: 0.4 % — SIGNIFICANT CHANGE UP (ref 0–0.9)
IMM GRANULOCYTES NFR BLD AUTO: 0.5 % — SIGNIFICANT CHANGE UP (ref 0–0.9)
IMM GRANULOCYTES NFR BLD AUTO: 0.6 % — SIGNIFICANT CHANGE UP (ref 0–0.9)
IMM GRANULOCYTES NFR BLD AUTO: 0.8 % — SIGNIFICANT CHANGE UP (ref 0–0.9)
INR BLD: 0.96 RATIO — SIGNIFICANT CHANGE UP (ref 0.85–1.18)
LACTATE SERPL-SCNC: 1.1 MMOL/L — SIGNIFICANT CHANGE UP (ref 0.7–2)
LYMPHOCYTES # BLD AUTO: 0.74 K/UL — LOW (ref 1–3.3)
LYMPHOCYTES # BLD AUTO: 0.94 K/UL — LOW (ref 1–3.3)
LYMPHOCYTES # BLD AUTO: 1.08 K/UL — SIGNIFICANT CHANGE UP (ref 1–3.3)
LYMPHOCYTES # BLD AUTO: 1.14 K/UL — SIGNIFICANT CHANGE UP (ref 1–3.3)
LYMPHOCYTES # BLD AUTO: 1.25 K/UL — SIGNIFICANT CHANGE UP (ref 1–3.3)
LYMPHOCYTES # BLD AUTO: 1.66 K/UL — SIGNIFICANT CHANGE UP (ref 1–3.3)
LYMPHOCYTES # BLD AUTO: 12.9 % — LOW (ref 13–44)
LYMPHOCYTES # BLD AUTO: 13.9 % — SIGNIFICANT CHANGE UP (ref 13–44)
LYMPHOCYTES # BLD AUTO: 15.6 % — SIGNIFICANT CHANGE UP (ref 13–44)
LYMPHOCYTES # BLD AUTO: 18.8 % — SIGNIFICANT CHANGE UP (ref 13–44)
LYMPHOCYTES # BLD AUTO: 9.6 % — LOW (ref 13–44)
LYMPHOCYTES # BLD AUTO: 9.9 % — LOW (ref 13–44)
MAGNESIUM SERPL-MCNC: 1.8 MG/DL — SIGNIFICANT CHANGE UP (ref 1.6–2.6)
MAGNESIUM SERPL-MCNC: 2.1 MG/DL — SIGNIFICANT CHANGE UP (ref 1.6–2.6)
MCHC RBC-ENTMCNC: 25.3 PG — LOW (ref 27–34)
MCHC RBC-ENTMCNC: 25.3 PG — LOW (ref 27–34)
MCHC RBC-ENTMCNC: 25.4 PG — LOW (ref 27–34)
MCHC RBC-ENTMCNC: 25.6 PG — LOW (ref 27–34)
MCHC RBC-ENTMCNC: 25.7 PG — LOW (ref 27–34)
MCHC RBC-ENTMCNC: 25.7 PG — LOW (ref 27–34)
MCHC RBC-ENTMCNC: 25.8 PG — LOW (ref 27–34)
MCHC RBC-ENTMCNC: 26.2 PG — LOW (ref 27–34)
MCHC RBC-ENTMCNC: 27.3 GM/DL — LOW (ref 32–36)
MCHC RBC-ENTMCNC: 27.7 GM/DL — LOW (ref 32–36)
MCHC RBC-ENTMCNC: 28.7 GM/DL — LOW (ref 32–36)
MCHC RBC-ENTMCNC: 28.7 GM/DL — LOW (ref 32–36)
MCHC RBC-ENTMCNC: 28.8 GM/DL — LOW (ref 32–36)
MCHC RBC-ENTMCNC: 28.9 GM/DL — LOW (ref 32–36)
MCHC RBC-ENTMCNC: 29.8 GM/DL — LOW (ref 32–36)
MCHC RBC-ENTMCNC: 29.9 GM/DL — LOW (ref 32–36)
MCHC RBC-ENTMCNC: 30.6 GM/DL — LOW (ref 32–36)
MCHC RBC-ENTMCNC: 30.8 GM/DL — LOW (ref 32–36)
MCV RBC AUTO: 83.2 FL — SIGNIFICANT CHANGE UP (ref 80–100)
MCV RBC AUTO: 83.8 FL — SIGNIFICANT CHANGE UP (ref 80–100)
MCV RBC AUTO: 85.1 FL — SIGNIFICANT CHANGE UP (ref 80–100)
MCV RBC AUTO: 86.4 FL — SIGNIFICANT CHANGE UP (ref 80–100)
MCV RBC AUTO: 87.8 FL — SIGNIFICANT CHANGE UP (ref 80–100)
MCV RBC AUTO: 89.2 FL — SIGNIFICANT CHANGE UP (ref 80–100)
MCV RBC AUTO: 89.8 FL — SIGNIFICANT CHANGE UP (ref 80–100)
MCV RBC AUTO: 91.1 FL — SIGNIFICANT CHANGE UP (ref 80–100)
MCV RBC AUTO: 91.3 FL — SIGNIFICANT CHANGE UP (ref 80–100)
MCV RBC AUTO: 94.4 FL — SIGNIFICANT CHANGE UP (ref 80–100)
MONOCYTES # BLD AUTO: 0.54 K/UL — SIGNIFICANT CHANGE UP (ref 0–0.9)
MONOCYTES # BLD AUTO: 0.6 K/UL — SIGNIFICANT CHANGE UP (ref 0–0.9)
MONOCYTES # BLD AUTO: 0.8 K/UL — SIGNIFICANT CHANGE UP (ref 0–0.9)
MONOCYTES # BLD AUTO: 0.82 K/UL — SIGNIFICANT CHANGE UP (ref 0–0.9)
MONOCYTES # BLD AUTO: 0.89 K/UL — SIGNIFICANT CHANGE UP (ref 0–0.9)
MONOCYTES # BLD AUTO: 0.97 K/UL — HIGH (ref 0–0.9)
MONOCYTES NFR BLD AUTO: 12.2 % — SIGNIFICANT CHANGE UP (ref 2–14)
MONOCYTES NFR BLD AUTO: 7.2 % — SIGNIFICANT CHANGE UP (ref 2–14)
MONOCYTES NFR BLD AUTO: 7.2 % — SIGNIFICANT CHANGE UP (ref 2–14)
MONOCYTES NFR BLD AUTO: 9.1 % — SIGNIFICANT CHANGE UP (ref 2–14)
MONOCYTES NFR BLD AUTO: 9.1 % — SIGNIFICANT CHANGE UP (ref 2–14)
MONOCYTES NFR BLD AUTO: 9.9 % — SIGNIFICANT CHANGE UP (ref 2–14)
NEUTROPHILS # BLD AUTO: 5.2 K/UL — SIGNIFICANT CHANGE UP (ref 1.8–7.4)
NEUTROPHILS # BLD AUTO: 6.12 K/UL — SIGNIFICANT CHANGE UP (ref 1.8–7.4)
NEUTROPHILS # BLD AUTO: 6.2 K/UL — SIGNIFICANT CHANGE UP (ref 1.8–7.4)
NEUTROPHILS # BLD AUTO: 6.57 K/UL — SIGNIFICANT CHANGE UP (ref 1.8–7.4)
NEUTROPHILS # BLD AUTO: 6.81 K/UL — SIGNIFICANT CHANGE UP (ref 1.8–7.4)
NEUTROPHILS # BLD AUTO: 7.86 K/UL — HIGH (ref 1.8–7.4)
NEUTROPHILS NFR BLD AUTO: 70.5 % — SIGNIFICANT CHANGE UP (ref 43–77)
NEUTROPHILS NFR BLD AUTO: 71.3 % — SIGNIFICANT CHANGE UP (ref 43–77)
NEUTROPHILS NFR BLD AUTO: 75.7 % — SIGNIFICANT CHANGE UP (ref 43–77)
NEUTROPHILS NFR BLD AUTO: 78.8 % — HIGH (ref 43–77)
NEUTROPHILS NFR BLD AUTO: 79.8 % — HIGH (ref 43–77)
NEUTROPHILS NFR BLD AUTO: 82 % — HIGH (ref 43–77)
NT-PROBNP SERPL-SCNC: 11 PG/ML — SIGNIFICANT CHANGE UP (ref 0–125)
NT-PROBNP SERPL-SCNC: 12 PG/ML — SIGNIFICANT CHANGE UP (ref 0–125)
PCO2 BLDA: 111 MMHG — CRITICAL HIGH (ref 32–45)
PCO2 BLDA: 115 MMHG — CRITICAL HIGH (ref 32–45)
PCO2 BLDA: 77 MMHG — CRITICAL HIGH (ref 32–45)
PCO2 BLDA: 78 MMHG — CRITICAL HIGH (ref 32–45)
PCO2 BLDA: 79 MMHG — CRITICAL HIGH (ref 32–45)
PCO2 BLDA: 81 MMHG — CRITICAL HIGH (ref 32–45)
PCO2 BLDA: 84 MMHG — CRITICAL HIGH (ref 32–45)
PCO2 BLDV: 49 MMHG — HIGH (ref 39–42)
PCO2 BLDV: 73 MMHG — HIGH (ref 39–42)
PCO2 BLDV: 81 MMHG — HIGH (ref 39–42)
PCO2 BLDV: 92 MMHG — HIGH (ref 39–42)
PH BLDA: 7.19 — CRITICAL LOW (ref 7.35–7.45)
PH BLDA: 7.21 — LOW (ref 7.35–7.45)
PH BLDA: 7.3 — LOW (ref 7.35–7.45)
PH BLDA: 7.31 — LOW (ref 7.35–7.45)
PH BLDA: 7.33 — LOW (ref 7.35–7.45)
PH BLDA: 7.34 — LOW (ref 7.35–7.45)
PH BLDA: 7.37 — SIGNIFICANT CHANGE UP (ref 7.35–7.45)
PH BLDV: 7.24 — LOW (ref 7.32–7.43)
PH BLDV: 7.34 — SIGNIFICANT CHANGE UP (ref 7.32–7.43)
PH BLDV: 7.36 — SIGNIFICANT CHANGE UP (ref 7.32–7.43)
PH BLDV: 7.46 — HIGH (ref 7.32–7.43)
PHOSPHATE SERPL-MCNC: 0.7 MG/DL — CRITICAL LOW (ref 2.5–4.5)
PHOSPHATE SERPL-MCNC: 1.2 MG/DL — LOW (ref 2.5–4.5)
PHOSPHATE SERPL-MCNC: 2.2 MG/DL — LOW (ref 2.5–4.5)
PHOSPHATE SERPL-MCNC: 3 MG/DL — SIGNIFICANT CHANGE UP (ref 2.5–4.5)
PLATELET # BLD AUTO: 134 K/UL — LOW (ref 150–400)
PLATELET # BLD AUTO: 176 K/UL — SIGNIFICANT CHANGE UP (ref 150–400)
PLATELET # BLD AUTO: 191 K/UL — SIGNIFICANT CHANGE UP (ref 150–400)
PLATELET # BLD AUTO: 194 K/UL — SIGNIFICANT CHANGE UP (ref 150–400)
PLATELET # BLD AUTO: 194 K/UL — SIGNIFICANT CHANGE UP (ref 150–400)
PLATELET # BLD AUTO: 203 K/UL — SIGNIFICANT CHANGE UP (ref 150–400)
PLATELET # BLD AUTO: 225 K/UL — SIGNIFICANT CHANGE UP (ref 150–400)
PLATELET # BLD AUTO: 229 K/UL — SIGNIFICANT CHANGE UP (ref 150–400)
PLATELET # BLD AUTO: 230 K/UL — SIGNIFICANT CHANGE UP (ref 150–400)
PLATELET # BLD AUTO: 261 K/UL — SIGNIFICANT CHANGE UP (ref 150–400)
PO2 BLDA: 103 MMHG — SIGNIFICANT CHANGE UP (ref 83–108)
PO2 BLDA: 105 MMHG — SIGNIFICANT CHANGE UP (ref 83–108)
PO2 BLDA: 152 MMHG — HIGH (ref 83–108)
PO2 BLDA: 72 MMHG — LOW (ref 83–108)
PO2 BLDA: 83 MMHG — SIGNIFICANT CHANGE UP (ref 83–108)
PO2 BLDA: 84 MMHG — SIGNIFICANT CHANGE UP (ref 83–108)
PO2 BLDA: 96 MMHG — SIGNIFICANT CHANGE UP (ref 83–108)
PO2 BLDV: 176 MMHG — HIGH (ref 25–45)
PO2 BLDV: 53 MMHG — HIGH (ref 25–45)
PO2 BLDV: 55 MMHG — HIGH (ref 25–45)
PO2 BLDV: 93 MMHG — HIGH (ref 25–45)
POTASSIUM SERPL-MCNC: 2.9 MMOL/L — CRITICAL LOW (ref 3.5–5.3)
POTASSIUM SERPL-MCNC: 3.5 MMOL/L — SIGNIFICANT CHANGE UP (ref 3.5–5.3)
POTASSIUM SERPL-MCNC: 3.6 MMOL/L — SIGNIFICANT CHANGE UP (ref 3.5–5.3)
POTASSIUM SERPL-MCNC: 3.7 MMOL/L — SIGNIFICANT CHANGE UP (ref 3.5–5.3)
POTASSIUM SERPL-MCNC: 3.8 MMOL/L — SIGNIFICANT CHANGE UP (ref 3.5–5.3)
POTASSIUM SERPL-MCNC: 4 MMOL/L — SIGNIFICANT CHANGE UP (ref 3.5–5.3)
POTASSIUM SERPL-MCNC: 4.1 MMOL/L — SIGNIFICANT CHANGE UP (ref 3.5–5.3)
POTASSIUM SERPL-MCNC: 4.2 MMOL/L — SIGNIFICANT CHANGE UP (ref 3.5–5.3)
POTASSIUM SERPL-MCNC: 4.4 MMOL/L — SIGNIFICANT CHANGE UP (ref 3.5–5.3)
POTASSIUM SERPL-MCNC: 4.7 MMOL/L — SIGNIFICANT CHANGE UP (ref 3.5–5.3)
POTASSIUM SERPL-SCNC: 2.9 MMOL/L — CRITICAL LOW (ref 3.5–5.3)
POTASSIUM SERPL-SCNC: 3.5 MMOL/L — SIGNIFICANT CHANGE UP (ref 3.5–5.3)
POTASSIUM SERPL-SCNC: 3.6 MMOL/L — SIGNIFICANT CHANGE UP (ref 3.5–5.3)
POTASSIUM SERPL-SCNC: 3.7 MMOL/L — SIGNIFICANT CHANGE UP (ref 3.5–5.3)
POTASSIUM SERPL-SCNC: 3.8 MMOL/L — SIGNIFICANT CHANGE UP (ref 3.5–5.3)
POTASSIUM SERPL-SCNC: 4 MMOL/L — SIGNIFICANT CHANGE UP (ref 3.5–5.3)
POTASSIUM SERPL-SCNC: 4.1 MMOL/L — SIGNIFICANT CHANGE UP (ref 3.5–5.3)
POTASSIUM SERPL-SCNC: 4.2 MMOL/L — SIGNIFICANT CHANGE UP (ref 3.5–5.3)
POTASSIUM SERPL-SCNC: 4.4 MMOL/L — SIGNIFICANT CHANGE UP (ref 3.5–5.3)
POTASSIUM SERPL-SCNC: 4.7 MMOL/L — SIGNIFICANT CHANGE UP (ref 3.5–5.3)
PROT SERPL-MCNC: 6.3 GM/DL — SIGNIFICANT CHANGE UP (ref 6–8.3)
PROT SERPL-MCNC: 6.8 GM/DL — SIGNIFICANT CHANGE UP (ref 6–8.3)
PROT SERPL-MCNC: 6.8 GM/DL — SIGNIFICANT CHANGE UP (ref 6–8.3)
PROT SERPL-MCNC: 7.4 GM/DL — SIGNIFICANT CHANGE UP (ref 6–8.3)
PROTHROM AB SERPL-ACNC: 10.9 SEC — SIGNIFICANT CHANGE UP (ref 9.5–13)
RBC # BLD: 4.26 M/UL — SIGNIFICANT CHANGE UP (ref 3.8–5.2)
RBC # BLD: 4.28 M/UL — SIGNIFICANT CHANGE UP (ref 3.8–5.2)
RBC # BLD: 4.3 M/UL — SIGNIFICANT CHANGE UP (ref 3.8–5.2)
RBC # BLD: 4.47 M/UL — SIGNIFICANT CHANGE UP (ref 3.8–5.2)
RBC # BLD: 4.5 M/UL — SIGNIFICANT CHANGE UP (ref 3.8–5.2)
RBC # BLD: 4.5 M/UL — SIGNIFICANT CHANGE UP (ref 3.8–5.2)
RBC # BLD: 4.98 M/UL — SIGNIFICANT CHANGE UP (ref 3.8–5.2)
RBC # BLD: 5.24 M/UL — HIGH (ref 3.8–5.2)
RBC # BLD: 5.26 M/UL — HIGH (ref 3.8–5.2)
RBC # BLD: 5.31 M/UL — HIGH (ref 3.8–5.2)
RBC # FLD: 14.5 % — SIGNIFICANT CHANGE UP (ref 10.3–14.5)
RBC # FLD: 14.7 % — HIGH (ref 10.3–14.5)
RBC # FLD: 14.8 % — HIGH (ref 10.3–14.5)
RBC # FLD: 15 % — HIGH (ref 10.3–14.5)
RBC # FLD: 15 % — HIGH (ref 10.3–14.5)
RBC # FLD: 15.2 % — HIGH (ref 10.3–14.5)
RBC # FLD: 15.4 % — HIGH (ref 10.3–14.5)
RBC # FLD: 15.5 % — HIGH (ref 10.3–14.5)
RBC # FLD: 15.6 % — HIGH (ref 10.3–14.5)
RBC # FLD: 15.7 % — HIGH (ref 10.3–14.5)
RSV RNA NPH QL NAA+NON-PROBE: SIGNIFICANT CHANGE UP
SAO2 % BLDA: 100 % — HIGH (ref 94–98)
SAO2 % BLDA: 96 % — SIGNIFICANT CHANGE UP (ref 94–98)
SAO2 % BLDA: 98 % — SIGNIFICANT CHANGE UP (ref 94–98)
SAO2 % BLDA: 98 % — SIGNIFICANT CHANGE UP (ref 94–98)
SAO2 % BLDA: 99 % — HIGH (ref 94–98)
SAO2 % BLDV: 100 % — HIGH (ref 67–88)
SAO2 % BLDV: 86 % — SIGNIFICANT CHANGE UP (ref 67–88)
SAO2 % BLDV: 87 % — SIGNIFICANT CHANGE UP (ref 67–88)
SAO2 % BLDV: 98 % — HIGH (ref 67–88)
SARS-COV-2 RNA SPEC QL NAA+PROBE: SIGNIFICANT CHANGE UP
SODIUM SERPL-SCNC: 138 MMOL/L — SIGNIFICANT CHANGE UP (ref 135–145)
SODIUM SERPL-SCNC: 138 MMOL/L — SIGNIFICANT CHANGE UP (ref 135–145)
SODIUM SERPL-SCNC: 140 MMOL/L — SIGNIFICANT CHANGE UP (ref 135–145)
SODIUM SERPL-SCNC: 140 MMOL/L — SIGNIFICANT CHANGE UP (ref 135–145)
SODIUM SERPL-SCNC: 141 MMOL/L — SIGNIFICANT CHANGE UP (ref 135–145)
SODIUM SERPL-SCNC: 141 MMOL/L — SIGNIFICANT CHANGE UP (ref 135–145)
SODIUM SERPL-SCNC: 142 MMOL/L — SIGNIFICANT CHANGE UP (ref 135–145)
SODIUM SERPL-SCNC: 143 MMOL/L — SIGNIFICANT CHANGE UP (ref 135–145)
SODIUM SERPL-SCNC: 143 MMOL/L — SIGNIFICANT CHANGE UP (ref 135–145)
SODIUM SERPL-SCNC: 144 MMOL/L — SIGNIFICANT CHANGE UP (ref 135–145)
TROPONIN I, HIGH SENSITIVITY RESULT: 3.93 NG/L — SIGNIFICANT CHANGE UP
TROPONIN I, HIGH SENSITIVITY RESULT: 6.04 NG/L — SIGNIFICANT CHANGE UP
TSH SERPL-MCNC: 0.93 UU/ML — SIGNIFICANT CHANGE UP (ref 0.34–4.82)
VIT B12 SERPL-MCNC: 1608 PG/ML — HIGH (ref 232–1245)
WBC # BLD: 6.22 K/UL — SIGNIFICANT CHANGE UP (ref 3.8–10.5)
WBC # BLD: 7.3 K/UL — SIGNIFICANT CHANGE UP (ref 3.8–10.5)
WBC # BLD: 7.47 K/UL — SIGNIFICANT CHANGE UP (ref 3.8–10.5)
WBC # BLD: 7.83 K/UL — SIGNIFICANT CHANGE UP (ref 3.8–10.5)
WBC # BLD: 8.34 K/UL — SIGNIFICANT CHANGE UP (ref 3.8–10.5)
WBC # BLD: 8.81 K/UL — SIGNIFICANT CHANGE UP (ref 3.8–10.5)
WBC # BLD: 9 K/UL — SIGNIFICANT CHANGE UP (ref 3.8–10.5)
WBC # BLD: 9.07 K/UL — SIGNIFICANT CHANGE UP (ref 3.8–10.5)
WBC # BLD: 9.11 K/UL — SIGNIFICANT CHANGE UP (ref 3.8–10.5)
WBC # BLD: 9.84 K/UL — SIGNIFICANT CHANGE UP (ref 3.8–10.5)
WBC # FLD AUTO: 6.22 K/UL — SIGNIFICANT CHANGE UP (ref 3.8–10.5)
WBC # FLD AUTO: 7.3 K/UL — SIGNIFICANT CHANGE UP (ref 3.8–10.5)
WBC # FLD AUTO: 7.47 K/UL — SIGNIFICANT CHANGE UP (ref 3.8–10.5)
WBC # FLD AUTO: 7.83 K/UL — SIGNIFICANT CHANGE UP (ref 3.8–10.5)
WBC # FLD AUTO: 8.34 K/UL — SIGNIFICANT CHANGE UP (ref 3.8–10.5)
WBC # FLD AUTO: 8.81 K/UL — SIGNIFICANT CHANGE UP (ref 3.8–10.5)
WBC # FLD AUTO: 9 K/UL — SIGNIFICANT CHANGE UP (ref 3.8–10.5)
WBC # FLD AUTO: 9.07 K/UL — SIGNIFICANT CHANGE UP (ref 3.8–10.5)
WBC # FLD AUTO: 9.11 K/UL — SIGNIFICANT CHANGE UP (ref 3.8–10.5)
WBC # FLD AUTO: 9.84 K/UL — SIGNIFICANT CHANGE UP (ref 3.8–10.5)

## 2024-01-01 PROCEDURE — 99233 SBSQ HOSP IP/OBS HIGH 50: CPT

## 2024-01-01 PROCEDURE — 36415 COLL VENOUS BLD VENIPUNCTURE: CPT

## 2024-01-01 PROCEDURE — 99232 SBSQ HOSP IP/OBS MODERATE 35: CPT

## 2024-01-01 PROCEDURE — 80048 BASIC METABOLIC PNL TOTAL CA: CPT

## 2024-01-01 PROCEDURE — 99223 1ST HOSP IP/OBS HIGH 75: CPT

## 2024-01-01 PROCEDURE — 71045 X-RAY EXAM CHEST 1 VIEW: CPT | Mod: 26

## 2024-01-01 PROCEDURE — 99498 ADVNCD CARE PLAN ADDL 30 MIN: CPT

## 2024-01-01 PROCEDURE — 92523 SPEECH SOUND LANG COMPREHEN: CPT | Mod: GN

## 2024-01-01 PROCEDURE — 83735 ASSAY OF MAGNESIUM: CPT

## 2024-01-01 PROCEDURE — 87635 SARS-COV-2 COVID-19 AMP PRB: CPT

## 2024-01-01 PROCEDURE — 85027 COMPLETE CBC AUTOMATED: CPT

## 2024-01-01 PROCEDURE — 73620 X-RAY EXAM OF FOOT: CPT | Mod: 26,RT

## 2024-01-01 PROCEDURE — 97530 THERAPEUTIC ACTIVITIES: CPT | Mod: GP

## 2024-01-01 PROCEDURE — 94660 CPAP INITIATION&MGMT: CPT

## 2024-01-01 PROCEDURE — 99497 ADVNCD CARE PLAN 30 MIN: CPT

## 2024-01-01 PROCEDURE — 99285 EMERGENCY DEPT VISIT HI MDM: CPT

## 2024-01-01 PROCEDURE — 0241U: CPT

## 2024-01-01 PROCEDURE — 93010 ELECTROCARDIOGRAM REPORT: CPT

## 2024-01-01 PROCEDURE — 36600 WITHDRAWAL OF ARTERIAL BLOOD: CPT

## 2024-01-01 PROCEDURE — 85025 COMPLETE CBC W/AUTO DIFF WBC: CPT

## 2024-01-01 PROCEDURE — 82803 BLOOD GASES ANY COMBINATION: CPT

## 2024-01-01 PROCEDURE — 70551 MRI BRAIN STEM W/O DYE: CPT | Mod: 26,52

## 2024-01-01 PROCEDURE — 90792 PSYCH DIAG EVAL W/MED SRVCS: CPT

## 2024-01-01 PROCEDURE — 71045 X-RAY EXAM CHEST 1 VIEW: CPT

## 2024-01-01 PROCEDURE — 86803 HEPATITIS C AB TEST: CPT

## 2024-01-01 PROCEDURE — 72125 CT NECK SPINE W/O DYE: CPT | Mod: 26,MC

## 2024-01-01 PROCEDURE — 93005 ELECTROCARDIOGRAM TRACING: CPT

## 2024-01-01 PROCEDURE — 93010 ELECTROCARDIOGRAM REPORT: CPT | Mod: 76

## 2024-01-01 PROCEDURE — 80076 HEPATIC FUNCTION PANEL: CPT

## 2024-01-01 PROCEDURE — 94760 N-INVAS EAR/PLS OXIMETRY 1: CPT

## 2024-01-01 PROCEDURE — 70551 MRI BRAIN STEM W/O DYE: CPT | Mod: MC,52

## 2024-01-01 PROCEDURE — 94150 VITAL CAPACITY TEST: CPT

## 2024-01-01 PROCEDURE — 80053 COMPREHEN METABOLIC PANEL: CPT

## 2024-01-01 PROCEDURE — 99285 EMERGENCY DEPT VISIT HI MDM: CPT | Mod: 25

## 2024-01-01 PROCEDURE — 84484 ASSAY OF TROPONIN QUANT: CPT

## 2024-01-01 PROCEDURE — 84100 ASSAY OF PHOSPHORUS: CPT

## 2024-01-01 PROCEDURE — 73600 X-RAY EXAM OF ANKLE: CPT | Mod: 26,RT

## 2024-01-01 PROCEDURE — 97110 THERAPEUTIC EXERCISES: CPT | Mod: GP

## 2024-01-01 PROCEDURE — 99497 ADVNCD CARE PLAN 30 MIN: CPT | Mod: 25

## 2024-01-01 PROCEDURE — 99222 1ST HOSP IP/OBS MODERATE 55: CPT

## 2024-01-01 PROCEDURE — 82607 VITAMIN B-12: CPT

## 2024-01-01 PROCEDURE — 97116 GAIT TRAINING THERAPY: CPT | Mod: GP

## 2024-01-01 PROCEDURE — 83605 ASSAY OF LACTIC ACID: CPT

## 2024-01-01 PROCEDURE — 83880 ASSAY OF NATRIURETIC PEPTIDE: CPT

## 2024-01-01 PROCEDURE — 99239 HOSP IP/OBS DSCHRG MGMT >30: CPT

## 2024-01-01 PROCEDURE — 92610 EVALUATE SWALLOWING FUNCTION: CPT | Mod: GN

## 2024-01-01 PROCEDURE — 70450 CT HEAD/BRAIN W/O DYE: CPT | Mod: 26,MC

## 2024-01-01 PROCEDURE — 97162 PT EVAL MOD COMPLEX 30 MIN: CPT | Mod: GP

## 2024-01-01 PROCEDURE — 82140 ASSAY OF AMMONIA: CPT

## 2024-01-01 PROCEDURE — 84443 ASSAY THYROID STIM HORMONE: CPT

## 2024-01-01 RX ORDER — IBUPROFEN 200 MG
400 TABLET ORAL EVERY 8 HOURS
Refills: 0 | Status: DISCONTINUED | OUTPATIENT
Start: 2024-01-01 | End: 2024-01-01

## 2024-01-01 RX ORDER — BUPROPION HYDROCHLORIDE 150 MG/1
450 TABLET, EXTENDED RELEASE ORAL DAILY
Refills: 0 | Status: DISCONTINUED | OUTPATIENT
Start: 2024-01-01 | End: 2024-01-01

## 2024-01-01 RX ORDER — METHOCARBAMOL 500 MG/1
750 TABLET, FILM COATED ORAL ONCE
Refills: 0 | Status: COMPLETED | OUTPATIENT
Start: 2024-01-01 | End: 2024-01-01

## 2024-01-01 RX ORDER — IBUPROFEN 200 MG
800 TABLET ORAL EVERY 6 HOURS
Refills: 0 | Status: DISCONTINUED | OUTPATIENT
Start: 2024-01-01 | End: 2024-01-01

## 2024-01-01 RX ORDER — ZOLPIDEM TARTRATE 10 MG/1
5 TABLET ORAL AT BEDTIME
Refills: 0 | Status: DISCONTINUED | OUTPATIENT
Start: 2024-01-01 | End: 2024-01-01

## 2024-01-01 RX ORDER — DEXTROAMPHETAMINE SACCHARATE, AMPHETAMINE ASPARTATE, DEXTROAMPHETAMINE SULFATE AND AMPHETAMINE SULFATE 1.875; 1.875; 1.875; 1.875 MG/1; MG/1; MG/1; MG/1
10 TABLET ORAL DAILY
Refills: 0 | Status: DISCONTINUED | OUTPATIENT
Start: 2024-01-01 | End: 2024-01-01

## 2024-01-01 RX ORDER — ESCITALOPRAM OXALATE 10 MG/1
1 TABLET, FILM COATED ORAL
Refills: 0 | DISCHARGE

## 2024-01-01 RX ORDER — SODIUM CHLORIDE 9 MG/ML
1000 INJECTION INTRAMUSCULAR; INTRAVENOUS; SUBCUTANEOUS ONCE
Refills: 0 | Status: COMPLETED | OUTPATIENT
Start: 2024-01-01 | End: 2024-01-01

## 2024-01-01 RX ORDER — ONDANSETRON 8 MG/1
4 TABLET, FILM COATED ORAL EVERY 6 HOURS
Refills: 0 | Status: DISCONTINUED | OUTPATIENT
Start: 2024-01-01 | End: 2024-01-01

## 2024-01-01 RX ORDER — ENOXAPARIN SODIUM 100 MG/ML
40 INJECTION SUBCUTANEOUS EVERY 24 HOURS
Refills: 0 | Status: DISCONTINUED | OUTPATIENT
Start: 2024-01-01 | End: 2024-01-01

## 2024-01-01 RX ORDER — ALPRAZOLAM 0.25 MG
0.5 TABLET ORAL THREE TIMES A DAY
Refills: 0 | Status: DISCONTINUED | OUTPATIENT
Start: 2024-01-01 | End: 2024-01-01

## 2024-01-01 RX ORDER — ACETAMINOPHEN 500 MG
650 TABLET ORAL EVERY 6 HOURS
Refills: 0 | Status: DISCONTINUED | OUTPATIENT
Start: 2024-01-01 | End: 2024-01-01

## 2024-01-01 RX ORDER — SODIUM CHLORIDE 9 MG/ML
1000 INJECTION, SOLUTION INTRAVENOUS
Refills: 0 | Status: DISCONTINUED | OUTPATIENT
Start: 2024-01-01 | End: 2024-01-01

## 2024-01-01 RX ORDER — ACETAMINOPHEN 500 MG
2 TABLET ORAL
Qty: 0 | Refills: 0 | DISCHARGE
Start: 2024-01-01

## 2024-01-01 RX ORDER — MORPHINE SULFATE 50 MG/1
2 CAPSULE, EXTENDED RELEASE ORAL
Refills: 0 | Status: DISCONTINUED | OUTPATIENT
Start: 2024-01-01 | End: 2024-01-01

## 2024-01-01 RX ORDER — MAGNESIUM SULFATE 500 MG/ML
1 VIAL (ML) INJECTION ONCE
Refills: 0 | Status: COMPLETED | OUTPATIENT
Start: 2024-01-01 | End: 2024-01-01

## 2024-01-01 RX ORDER — FLUOXETINE HCL 10 MG
1 CAPSULE ORAL
Refills: 0 | DISCHARGE

## 2024-01-01 RX ORDER — LIDOCAINE 4 G/100G
1 CREAM TOPICAL DAILY
Refills: 0 | Status: DISCONTINUED | OUTPATIENT
Start: 2024-01-01 | End: 2024-01-01

## 2024-01-01 RX ORDER — ONDANSETRON 8 MG/1
4 TABLET, FILM COATED ORAL ONCE
Refills: 0 | Status: COMPLETED | OUTPATIENT
Start: 2024-01-01 | End: 2024-01-01

## 2024-01-01 RX ORDER — LANOLIN ALCOHOL/MO/W.PET/CERES
3 CREAM (GRAM) TOPICAL AT BEDTIME
Refills: 0 | Status: DISCONTINUED | OUTPATIENT
Start: 2024-01-01 | End: 2024-01-01

## 2024-01-01 RX ORDER — ROBINUL 0.2 MG/ML
0.2 INJECTION INTRAMUSCULAR; INTRAVENOUS EVERY 6 HOURS
Refills: 0 | Status: DISCONTINUED | OUTPATIENT
Start: 2024-01-01 | End: 2024-01-01

## 2024-01-01 RX ORDER — KETOROLAC TROMETHAMINE 30 MG/ML
30 SYRINGE (ML) INJECTION ONCE
Refills: 0 | Status: DISCONTINUED | OUTPATIENT
Start: 2024-01-01 | End: 2024-01-01

## 2024-01-01 RX ORDER — RILUZOLE 50 MG/1
50 TABLET ORAL
Refills: 0 | Status: DISCONTINUED | OUTPATIENT
Start: 2024-01-01 | End: 2024-01-01

## 2024-01-01 RX ORDER — ONDANSETRON 8 MG/1
4 TABLET, FILM COATED ORAL EVERY 8 HOURS
Refills: 0 | Status: DISCONTINUED | OUTPATIENT
Start: 2024-01-01 | End: 2024-01-01

## 2024-01-01 RX ORDER — FLUOXETINE HCL 10 MG
20 CAPSULE ORAL DAILY
Refills: 0 | Status: DISCONTINUED | OUTPATIENT
Start: 2024-01-01 | End: 2024-01-01

## 2024-01-01 RX ORDER — POTASSIUM CHLORIDE 20 MEQ
10 PACKET (EA) ORAL
Refills: 0 | Status: COMPLETED | OUTPATIENT
Start: 2024-01-01 | End: 2024-01-01

## 2024-01-01 RX ORDER — ACETAMINOPHEN 500 MG
1000 TABLET ORAL ONCE
Refills: 0 | Status: COMPLETED | OUTPATIENT
Start: 2024-01-01 | End: 2024-01-01

## 2024-01-01 RX ORDER — DEXTROAMPHETAMINE SACCHARATE, AMPHETAMINE ASPARTATE, DEXTROAMPHETAMINE SULFATE AND AMPHETAMINE SULFATE 1.875; 1.875; 1.875; 1.875 MG/1; MG/1; MG/1; MG/1
1 TABLET ORAL
Refills: 0 | DISCHARGE

## 2024-01-01 RX ORDER — RILUZOLE 50 MG/1
1 TABLET ORAL
Refills: 0 | DISCHARGE

## 2024-01-01 RX ORDER — METOCLOPRAMIDE HCL 10 MG
10 TABLET ORAL ONCE
Refills: 0 | Status: COMPLETED | OUTPATIENT
Start: 2024-01-01 | End: 2024-01-01

## 2024-01-01 RX ORDER — ACETAMINOPHEN 500 MG
650 TABLET ORAL ONCE
Refills: 0 | Status: COMPLETED | OUTPATIENT
Start: 2024-01-01 | End: 2024-01-01

## 2024-01-01 RX ORDER — ENOXAPARIN SODIUM 100 MG/ML
40 INJECTION SUBCUTANEOUS
Qty: 0 | Refills: 0 | DISCHARGE
Start: 2024-01-01

## 2024-01-01 RX ORDER — LIDOCAINE 4 G/100G
1 CREAM TOPICAL ONCE
Refills: 0 | Status: COMPLETED | OUTPATIENT
Start: 2024-01-01 | End: 2024-01-01

## 2024-01-01 RX ORDER — FLUOXETINE HCL 10 MG
10 CAPSULE ORAL DAILY
Refills: 0 | Status: DISCONTINUED | OUTPATIENT
Start: 2024-01-01 | End: 2024-01-01

## 2024-01-01 RX ORDER — BUPROPION HYDROCHLORIDE 150 MG/1
150 TABLET, EXTENDED RELEASE ORAL DAILY
Refills: 0 | Status: DISCONTINUED | OUTPATIENT
Start: 2024-01-01 | End: 2024-01-01

## 2024-01-01 RX ORDER — LANOLIN ALCOHOL/MO/W.PET/CERES
5 CREAM (GRAM) TOPICAL AT BEDTIME
Refills: 0 | Status: DISCONTINUED | OUTPATIENT
Start: 2024-01-01 | End: 2024-01-01

## 2024-01-01 RX ORDER — INFLUENZA VIRUS VACCINE 15; 15; 15; 15 UG/.5ML; UG/.5ML; UG/.5ML; UG/.5ML
0.5 SUSPENSION INTRAMUSCULAR ONCE
Refills: 0 | Status: DISCONTINUED | OUTPATIENT
Start: 2024-01-01 | End: 2024-01-01

## 2024-01-01 RX ORDER — ATORVASTATIN CALCIUM 80 MG/1
10 TABLET, FILM COATED ORAL AT BEDTIME
Refills: 0 | Status: DISCONTINUED | OUTPATIENT
Start: 2024-01-01 | End: 2024-01-01

## 2024-01-01 RX ORDER — LANOLIN ALCOHOL/MO/W.PET/CERES
10 CREAM (GRAM) TOPICAL AT BEDTIME
Refills: 0 | Status: DISCONTINUED | OUTPATIENT
Start: 2024-01-01 | End: 2024-01-01

## 2024-01-01 RX ORDER — LANOLIN ALCOHOL/MO/W.PET/CERES
10 CREAM (GRAM) TOPICAL
Refills: 0 | DISCHARGE
Start: 2024-01-01

## 2024-01-01 RX ORDER — DEXTROAMPHETAMINE SACCHARATE, AMPHETAMINE ASPARTATE, DEXTROAMPHETAMINE SULFATE AND AMPHETAMINE SULFATE 1.875; 1.875; 1.875; 1.875 MG/1; MG/1; MG/1; MG/1
5 TABLET ORAL DAILY
Refills: 0 | Status: DISCONTINUED | OUTPATIENT
Start: 2024-01-01 | End: 2024-01-01

## 2024-01-01 RX ORDER — MORPHINE SULFATE 50 MG/1
4 CAPSULE, EXTENDED RELEASE ORAL ONCE
Refills: 0 | Status: DISCONTINUED | OUTPATIENT
Start: 2024-01-01 | End: 2024-01-01

## 2024-01-01 RX ORDER — DIAZEPAM 5 MG
2 TABLET ORAL ONCE
Refills: 0 | Status: DISCONTINUED | OUTPATIENT
Start: 2024-01-01 | End: 2024-01-01

## 2024-01-01 RX ORDER — EDARAVONE 105 MG/5ML
5 KIT ORAL
Refills: 0 | DISCHARGE

## 2024-01-01 RX ORDER — ESCITALOPRAM OXALATE 10 MG/1
10 TABLET, FILM COATED ORAL DAILY
Refills: 0 | Status: DISCONTINUED | OUTPATIENT
Start: 2024-01-01 | End: 2024-01-01

## 2024-01-01 RX ORDER — SODIUM CHLORIDE 9 MG/ML
1000 INJECTION, SOLUTION INTRAVENOUS
Refills: 0 | Status: COMPLETED | OUTPATIENT
Start: 2024-01-01 | End: 2024-01-01

## 2024-01-01 RX ORDER — MORPHINE SULFATE 50 MG/1
1 CAPSULE, EXTENDED RELEASE ORAL ONCE
Refills: 0 | Status: DISCONTINUED | OUTPATIENT
Start: 2024-01-01 | End: 2024-01-01

## 2024-01-01 RX ORDER — ONDANSETRON 8 MG/1
4 TABLET, FILM COATED ORAL EVERY 4 HOURS
Refills: 0 | Status: DISCONTINUED | OUTPATIENT
Start: 2024-01-01 | End: 2024-01-01

## 2024-01-01 RX ORDER — DEXTROAMPHETAMINE SACCHARATE, AMPHETAMINE ASPARTATE, DEXTROAMPHETAMINE SULFATE AND AMPHETAMINE SULFATE 1.875; 1.875; 1.875; 1.875 MG/1; MG/1; MG/1; MG/1
10 TABLET ORAL
Refills: 0 | DISCHARGE

## 2024-01-01 RX ORDER — ZOLPIDEM TARTRATE 10 MG/1
1 TABLET ORAL
Qty: 0 | Refills: 0 | DISCHARGE
Start: 2024-01-01

## 2024-01-01 RX ORDER — MORPHINE SULFATE 50 MG/1
1 CAPSULE, EXTENDED RELEASE ORAL
Refills: 0 | Status: DISCONTINUED | OUTPATIENT
Start: 2024-01-01 | End: 2024-01-01

## 2024-01-01 RX ORDER — POTASSIUM PHOSPHATE, MONOBASIC POTASSIUM PHOSPHATE, DIBASIC 236; 224 MG/ML; MG/ML
30 INJECTION, SOLUTION INTRAVENOUS ONCE
Refills: 0 | Status: COMPLETED | OUTPATIENT
Start: 2024-01-01 | End: 2024-01-01

## 2024-01-01 RX ORDER — ALPRAZOLAM 0.25 MG
1 TABLET ORAL
Refills: 0 | DISCHARGE

## 2024-01-01 RX ORDER — DEXAMETHASONE 0.5 MG/5ML
6 ELIXIR ORAL ONCE
Refills: 0 | Status: COMPLETED | OUTPATIENT
Start: 2024-01-01 | End: 2024-01-01

## 2024-01-01 RX ORDER — SENNA PLUS 8.6 MG/1
2 TABLET ORAL AT BEDTIME
Refills: 0 | Status: DISCONTINUED | OUTPATIENT
Start: 2024-01-01 | End: 2024-01-01

## 2024-01-01 RX ORDER — ZOLPIDEM TARTRATE 10 MG/1
10 TABLET ORAL AT BEDTIME
Refills: 0 | Status: DISCONTINUED | OUTPATIENT
Start: 2024-01-01 | End: 2024-01-01

## 2024-01-01 RX ORDER — ALPRAZOLAM 0.25 MG
0.5 TABLET ORAL ONCE
Refills: 0 | Status: DISCONTINUED | OUTPATIENT
Start: 2024-01-01 | End: 2024-01-01

## 2024-01-01 RX ORDER — ATORVASTATIN CALCIUM 80 MG/1
1 TABLET, FILM COATED ORAL
Refills: 0 | DISCHARGE

## 2024-01-01 RX ORDER — IBUPROFEN 200 MG
600 TABLET ORAL EVERY 6 HOURS
Refills: 0 | Status: DISCONTINUED | OUTPATIENT
Start: 2024-01-01 | End: 2024-01-01

## 2024-01-01 RX ORDER — ONDANSETRON 8 MG/1
1 TABLET, FILM COATED ORAL
Qty: 0 | Refills: 0 | DISCHARGE
Start: 2024-01-01

## 2024-01-01 RX ORDER — SODIUM,POTASSIUM PHOSPHATES 278-250MG
1 POWDER IN PACKET (EA) ORAL
Refills: 0 | Status: COMPLETED | OUTPATIENT
Start: 2024-01-01 | End: 2024-01-01

## 2024-01-01 RX ORDER — MORPHINE SULFATE 50 MG/1
2 CAPSULE, EXTENDED RELEASE ORAL
Qty: 0 | Refills: 0 | DISCHARGE
Start: 2024-01-01

## 2024-01-01 RX ORDER — LORATADINE 10 MG/1
1 TABLET ORAL
Refills: 0 | DISCHARGE

## 2024-01-01 RX ORDER — DIPHENHYDRAMINE HCL 50 MG
25 CAPSULE ORAL ONCE
Refills: 0 | Status: COMPLETED | OUTPATIENT
Start: 2024-01-01 | End: 2024-01-01

## 2024-01-01 RX ORDER — BUPROPION HYDROCHLORIDE 150 MG/1
3 TABLET, EXTENDED RELEASE ORAL
Refills: 0 | DISCHARGE

## 2024-01-01 RX ORDER — VALPROIC ACID (AS SODIUM SALT) 250 MG/5ML
500 SOLUTION, ORAL ORAL ONCE
Refills: 0 | Status: COMPLETED | OUTPATIENT
Start: 2024-01-01 | End: 2024-01-01

## 2024-01-01 RX ORDER — ZOLPIDEM TARTRATE 10 MG/1
10 TABLET ORAL ONCE
Refills: 0 | Status: DISCONTINUED | OUTPATIENT
Start: 2024-01-01 | End: 2024-01-01

## 2024-01-01 RX ADMIN — ONDANSETRON 4 MILLIGRAM(S): 8 TABLET, FILM COATED ORAL at 20:52

## 2024-01-01 RX ADMIN — RILUZOLE 50 MILLIGRAM(S): 50 TABLET ORAL at 22:08

## 2024-01-01 RX ADMIN — ONDANSETRON 4 MILLIGRAM(S): 8 TABLET, FILM COATED ORAL at 20:55

## 2024-01-01 RX ADMIN — MORPHINE SULFATE 2 MILLIGRAM(S): 50 CAPSULE, EXTENDED RELEASE ORAL at 14:13

## 2024-01-01 RX ADMIN — Medication 1 PACKET(S): at 11:40

## 2024-01-01 RX ADMIN — Medication 600 MILLIGRAM(S): at 10:40

## 2024-01-01 RX ADMIN — Medication 400 MILLIGRAM(S): at 11:20

## 2024-01-01 RX ADMIN — LIDOCAINE 1 PATCH: 4 CREAM TOPICAL at 09:49

## 2024-01-01 RX ADMIN — LIDOCAINE 1 PATCH: 4 CREAM TOPICAL at 00:00

## 2024-01-01 RX ADMIN — SENNA PLUS 2 TABLET(S): 8.6 TABLET ORAL at 21:55

## 2024-01-01 RX ADMIN — Medication 400 MILLIGRAM(S): at 22:19

## 2024-01-01 RX ADMIN — ENOXAPARIN SODIUM 40 MILLIGRAM(S): 100 INJECTION SUBCUTANEOUS at 11:59

## 2024-01-01 RX ADMIN — Medication 1 TABLET(S): at 11:35

## 2024-01-01 RX ADMIN — Medication 85 MILLIMOLE(S): at 11:40

## 2024-01-01 RX ADMIN — Medication 1 TABLET(S): at 11:05

## 2024-01-01 RX ADMIN — ATORVASTATIN CALCIUM 10 MILLIGRAM(S): 80 TABLET, FILM COATED ORAL at 21:30

## 2024-01-01 RX ADMIN — Medication 650 MILLIGRAM(S): at 21:59

## 2024-01-01 RX ADMIN — ESCITALOPRAM OXALATE 10 MILLIGRAM(S): 10 TABLET, FILM COATED ORAL at 09:37

## 2024-01-01 RX ADMIN — RILUZOLE 50 MILLIGRAM(S): 50 TABLET ORAL at 11:40

## 2024-01-01 RX ADMIN — RILUZOLE 50 MILLIGRAM(S): 50 TABLET ORAL at 21:21

## 2024-01-01 RX ADMIN — BUPROPION HYDROCHLORIDE 450 MILLIGRAM(S): 150 TABLET, EXTENDED RELEASE ORAL at 12:22

## 2024-01-01 RX ADMIN — Medication 800 MILLIGRAM(S): at 12:10

## 2024-01-01 RX ADMIN — ENOXAPARIN SODIUM 40 MILLIGRAM(S): 100 INJECTION SUBCUTANEOUS at 09:06

## 2024-01-01 RX ADMIN — LIDOCAINE 1 PATCH: 4 CREAM TOPICAL at 14:45

## 2024-01-01 RX ADMIN — SODIUM CHLORIDE 1000 MILLILITER(S): 9 INJECTION INTRAMUSCULAR; INTRAVENOUS; SUBCUTANEOUS at 01:45

## 2024-01-01 RX ADMIN — RILUZOLE 50 MILLIGRAM(S): 50 TABLET ORAL at 20:18

## 2024-01-01 RX ADMIN — MORPHINE SULFATE 1 MILLIGRAM(S): 50 CAPSULE, EXTENDED RELEASE ORAL at 15:39

## 2024-01-01 RX ADMIN — Medication 0.5 MILLIGRAM(S): at 01:03

## 2024-01-01 RX ADMIN — LIDOCAINE 1 PATCH: 4 CREAM TOPICAL at 22:52

## 2024-01-01 RX ADMIN — Medication 100 MILLIEQUIVALENT(S): at 15:34

## 2024-01-01 RX ADMIN — LIDOCAINE 1 PATCH: 4 CREAM TOPICAL at 10:55

## 2024-01-01 RX ADMIN — ENOXAPARIN SODIUM 40 MILLIGRAM(S): 100 INJECTION SUBCUTANEOUS at 11:10

## 2024-01-01 RX ADMIN — ATORVASTATIN CALCIUM 10 MILLIGRAM(S): 80 TABLET, FILM COATED ORAL at 22:07

## 2024-01-01 RX ADMIN — Medication 10 MILLIGRAM(S): at 21:40

## 2024-01-01 RX ADMIN — SODIUM CHLORIDE 75 MILLILITER(S): 9 INJECTION, SOLUTION INTRAVENOUS at 08:37

## 2024-01-01 RX ADMIN — RILUZOLE 50 MILLIGRAM(S): 50 TABLET ORAL at 22:48

## 2024-01-01 RX ADMIN — Medication 650 MILLIGRAM(S): at 03:40

## 2024-01-01 RX ADMIN — Medication 20 MILLIGRAM(S): at 10:24

## 2024-01-01 RX ADMIN — Medication 1 TABLET(S): at 17:59

## 2024-01-01 RX ADMIN — ESCITALOPRAM OXALATE 10 MILLIGRAM(S): 10 TABLET, FILM COATED ORAL at 11:20

## 2024-01-01 RX ADMIN — RILUZOLE 50 MILLIGRAM(S): 50 TABLET ORAL at 09:46

## 2024-01-01 RX ADMIN — LIDOCAINE 1 PATCH: 4 CREAM TOPICAL at 19:53

## 2024-01-01 RX ADMIN — Medication 1 PACKET(S): at 06:41

## 2024-01-01 RX ADMIN — ATORVASTATIN CALCIUM 10 MILLIGRAM(S): 80 TABLET, FILM COATED ORAL at 21:56

## 2024-01-01 RX ADMIN — DEXTROAMPHETAMINE SACCHARATE, AMPHETAMINE ASPARTATE, DEXTROAMPHETAMINE SULFATE AND AMPHETAMINE SULFATE 10 MILLIGRAM(S): 1.875; 1.875; 1.875; 1.875 TABLET ORAL at 11:18

## 2024-01-01 RX ADMIN — ENOXAPARIN SODIUM 40 MILLIGRAM(S): 100 INJECTION SUBCUTANEOUS at 10:11

## 2024-01-01 RX ADMIN — Medication 20 MILLIGRAM(S): at 11:05

## 2024-01-01 RX ADMIN — ESCITALOPRAM OXALATE 10 MILLIGRAM(S): 10 TABLET, FILM COATED ORAL at 10:41

## 2024-01-01 RX ADMIN — Medication 10 MILLIGRAM(S): at 20:17

## 2024-01-01 RX ADMIN — Medication 1000 MILLIGRAM(S): at 10:45

## 2024-01-01 RX ADMIN — Medication 10 MILLIGRAM(S): at 10:23

## 2024-01-01 RX ADMIN — LIDOCAINE 1 PATCH: 4 CREAM TOPICAL at 17:05

## 2024-01-01 RX ADMIN — ENOXAPARIN SODIUM 40 MILLIGRAM(S): 100 INJECTION SUBCUTANEOUS at 12:23

## 2024-01-01 RX ADMIN — Medication 20 MILLIGRAM(S): at 10:07

## 2024-01-01 RX ADMIN — SENNA PLUS 2 TABLET(S): 8.6 TABLET ORAL at 21:40

## 2024-01-01 RX ADMIN — ZOLPIDEM TARTRATE 5 MILLIGRAM(S): 10 TABLET ORAL at 21:29

## 2024-01-01 RX ADMIN — ATORVASTATIN CALCIUM 10 MILLIGRAM(S): 80 TABLET, FILM COATED ORAL at 22:48

## 2024-01-01 RX ADMIN — Medication 650 MILLIGRAM(S): at 10:25

## 2024-01-01 RX ADMIN — Medication 600 MILLIGRAM(S): at 11:35

## 2024-01-01 RX ADMIN — RILUZOLE 50 MILLIGRAM(S): 50 TABLET ORAL at 21:30

## 2024-01-01 RX ADMIN — LIDOCAINE 1 PATCH: 4 CREAM TOPICAL at 09:06

## 2024-01-01 RX ADMIN — Medication 20 MILLIGRAM(S): at 10:40

## 2024-01-01 RX ADMIN — ENOXAPARIN SODIUM 40 MILLIGRAM(S): 100 INJECTION SUBCUTANEOUS at 10:47

## 2024-01-01 RX ADMIN — Medication 10 MILLIGRAM(S): at 10:06

## 2024-01-01 RX ADMIN — BUPROPION HYDROCHLORIDE 450 MILLIGRAM(S): 150 TABLET, EXTENDED RELEASE ORAL at 11:19

## 2024-01-01 RX ADMIN — BUPROPION HYDROCHLORIDE 450 MILLIGRAM(S): 150 TABLET, EXTENDED RELEASE ORAL at 10:40

## 2024-01-01 RX ADMIN — MORPHINE SULFATE 2 MILLIGRAM(S): 50 CAPSULE, EXTENDED RELEASE ORAL at 07:59

## 2024-01-01 RX ADMIN — LIDOCAINE 1 PATCH: 4 CREAM TOPICAL at 12:21

## 2024-01-01 RX ADMIN — LIDOCAINE 1 PATCH: 4 CREAM TOPICAL at 12:54

## 2024-01-01 RX ADMIN — Medication 10 MILLIGRAM(S): at 20:03

## 2024-01-01 RX ADMIN — Medication 400 MILLIGRAM(S): at 09:14

## 2024-01-01 RX ADMIN — Medication 10 MILLIGRAM(S): at 11:05

## 2024-01-01 RX ADMIN — Medication 600 MILLIGRAM(S): at 11:21

## 2024-01-01 RX ADMIN — Medication 650 MILLIGRAM(S): at 17:44

## 2024-01-01 RX ADMIN — ONDANSETRON 4 MILLIGRAM(S): 8 TABLET, FILM COATED ORAL at 09:54

## 2024-01-01 RX ADMIN — MORPHINE SULFATE 2 MILLIGRAM(S): 50 CAPSULE, EXTENDED RELEASE ORAL at 11:58

## 2024-01-01 RX ADMIN — Medication 0.5 MILLIGRAM(S): at 03:29

## 2024-01-01 RX ADMIN — RILUZOLE 50 MILLIGRAM(S): 50 TABLET ORAL at 10:25

## 2024-01-01 RX ADMIN — ZOLPIDEM TARTRATE 5 MILLIGRAM(S): 10 TABLET ORAL at 21:13

## 2024-01-01 RX ADMIN — BUPROPION HYDROCHLORIDE 450 MILLIGRAM(S): 150 TABLET, EXTENDED RELEASE ORAL at 11:11

## 2024-01-01 RX ADMIN — BUPROPION HYDROCHLORIDE 450 MILLIGRAM(S): 150 TABLET, EXTENDED RELEASE ORAL at 10:23

## 2024-01-01 RX ADMIN — Medication 0.5 MILLIGRAM(S): at 10:46

## 2024-01-01 RX ADMIN — Medication 650 MILLIGRAM(S): at 04:15

## 2024-01-01 RX ADMIN — MORPHINE SULFATE 4 MILLIGRAM(S): 50 CAPSULE, EXTENDED RELEASE ORAL at 11:02

## 2024-01-01 RX ADMIN — Medication 650 MILLIGRAM(S): at 18:14

## 2024-01-01 RX ADMIN — Medication 6 MILLIGRAM(S): at 10:24

## 2024-01-01 RX ADMIN — Medication 600 MILLIGRAM(S): at 20:33

## 2024-01-01 RX ADMIN — MORPHINE SULFATE 2 MILLIGRAM(S): 50 CAPSULE, EXTENDED RELEASE ORAL at 12:52

## 2024-01-01 RX ADMIN — DEXTROAMPHETAMINE SACCHARATE, AMPHETAMINE ASPARTATE, DEXTROAMPHETAMINE SULFATE AND AMPHETAMINE SULFATE 10 MILLIGRAM(S): 1.875; 1.875; 1.875; 1.875 TABLET ORAL at 16:40

## 2024-01-01 RX ADMIN — Medication 1 TABLET(S): at 23:35

## 2024-01-01 RX ADMIN — Medication 650 MILLIGRAM(S): at 03:55

## 2024-01-01 RX ADMIN — RILUZOLE 50 MILLIGRAM(S): 50 TABLET ORAL at 10:39

## 2024-01-01 RX ADMIN — Medication 1 TABLET(S): at 10:46

## 2024-01-01 RX ADMIN — DEXTROAMPHETAMINE SACCHARATE, AMPHETAMINE ASPARTATE, DEXTROAMPHETAMINE SULFATE AND AMPHETAMINE SULFATE 10 MILLIGRAM(S): 1.875; 1.875; 1.875; 1.875 TABLET ORAL at 09:37

## 2024-01-01 RX ADMIN — ATORVASTATIN CALCIUM 10 MILLIGRAM(S): 80 TABLET, FILM COATED ORAL at 21:09

## 2024-01-01 RX ADMIN — Medication 55 MILLIGRAM(S): at 10:56

## 2024-01-01 RX ADMIN — SODIUM CHLORIDE 55 MILLILITER(S): 9 INJECTION, SOLUTION INTRAVENOUS at 22:02

## 2024-01-01 RX ADMIN — METHOCARBAMOL 750 MILLIGRAM(S): 500 TABLET, FILM COATED ORAL at 20:14

## 2024-01-01 RX ADMIN — ZOLPIDEM TARTRATE 10 MILLIGRAM(S): 10 TABLET ORAL at 01:57

## 2024-01-01 RX ADMIN — LIDOCAINE 1 PATCH: 4 CREAM TOPICAL at 21:00

## 2024-01-01 RX ADMIN — RILUZOLE 50 MILLIGRAM(S): 50 TABLET ORAL at 22:01

## 2024-01-01 RX ADMIN — Medication 650 MILLIGRAM(S): at 07:01

## 2024-01-01 RX ADMIN — ENOXAPARIN SODIUM 40 MILLIGRAM(S): 100 INJECTION SUBCUTANEOUS at 10:24

## 2024-01-01 RX ADMIN — Medication 100 MILLIEQUIVALENT(S): at 12:50

## 2024-01-01 RX ADMIN — MORPHINE SULFATE 2 MILLIGRAM(S): 50 CAPSULE, EXTENDED RELEASE ORAL at 08:34

## 2024-01-01 RX ADMIN — RILUZOLE 50 MILLIGRAM(S): 50 TABLET ORAL at 21:10

## 2024-01-01 RX ADMIN — ROBINUL 0.2 MILLIGRAM(S): 0.2 INJECTION INTRAMUSCULAR; INTRAVENOUS at 12:42

## 2024-01-01 RX ADMIN — Medication 400 MILLIGRAM(S): at 06:01

## 2024-01-01 RX ADMIN — ATORVASTATIN CALCIUM 10 MILLIGRAM(S): 80 TABLET, FILM COATED ORAL at 21:25

## 2024-01-01 RX ADMIN — ZOLPIDEM TARTRATE 5 MILLIGRAM(S): 10 TABLET ORAL at 22:18

## 2024-01-01 RX ADMIN — SENNA PLUS 2 TABLET(S): 8.6 TABLET ORAL at 21:22

## 2024-01-01 RX ADMIN — ENOXAPARIN SODIUM 40 MILLIGRAM(S): 100 INJECTION SUBCUTANEOUS at 10:25

## 2024-01-01 RX ADMIN — MORPHINE SULFATE 2 MILLIGRAM(S): 50 CAPSULE, EXTENDED RELEASE ORAL at 08:19

## 2024-01-01 RX ADMIN — Medication 650 MILLIGRAM(S): at 20:26

## 2024-01-01 RX ADMIN — RILUZOLE 50 MILLIGRAM(S): 50 TABLET ORAL at 21:56

## 2024-01-01 RX ADMIN — DEXTROAMPHETAMINE SACCHARATE, AMPHETAMINE ASPARTATE, DEXTROAMPHETAMINE SULFATE AND AMPHETAMINE SULFATE 10 MILLIGRAM(S): 1.875; 1.875; 1.875; 1.875 TABLET ORAL at 11:11

## 2024-01-01 RX ADMIN — ESCITALOPRAM OXALATE 10 MILLIGRAM(S): 10 TABLET, FILM COATED ORAL at 10:57

## 2024-01-01 RX ADMIN — Medication 650 MILLIGRAM(S): at 19:56

## 2024-01-01 RX ADMIN — LIDOCAINE 1 PATCH: 4 CREAM TOPICAL at 10:48

## 2024-01-01 RX ADMIN — ONDANSETRON 4 MILLIGRAM(S): 8 TABLET, FILM COATED ORAL at 11:44

## 2024-01-01 RX ADMIN — Medication 800 MILLIGRAM(S): at 11:10

## 2024-01-01 RX ADMIN — Medication 20 MILLIGRAM(S): at 10:23

## 2024-01-01 RX ADMIN — Medication 800 MILLIGRAM(S): at 04:59

## 2024-01-01 RX ADMIN — ATORVASTATIN CALCIUM 10 MILLIGRAM(S): 80 TABLET, FILM COATED ORAL at 20:17

## 2024-01-01 RX ADMIN — RILUZOLE 50 MILLIGRAM(S): 50 TABLET ORAL at 21:41

## 2024-01-01 RX ADMIN — ENOXAPARIN SODIUM 40 MILLIGRAM(S): 100 INJECTION SUBCUTANEOUS at 10:45

## 2024-01-01 RX ADMIN — Medication 600 MILLIGRAM(S): at 11:20

## 2024-01-01 RX ADMIN — RILUZOLE 50 MILLIGRAM(S): 50 TABLET ORAL at 10:07

## 2024-01-01 RX ADMIN — Medication 10 MILLIGRAM(S): at 10:25

## 2024-01-01 RX ADMIN — Medication 400 MILLIGRAM(S): at 10:15

## 2024-01-01 RX ADMIN — LIDOCAINE 1 PATCH: 4 CREAM TOPICAL at 02:15

## 2024-01-01 RX ADMIN — RILUZOLE 50 MILLIGRAM(S): 50 TABLET ORAL at 11:05

## 2024-01-01 RX ADMIN — SODIUM CHLORIDE 1000 MILLILITER(S): 9 INJECTION INTRAMUSCULAR; INTRAVENOUS; SUBCUTANEOUS at 20:00

## 2024-01-01 RX ADMIN — Medication 1 PACKET(S): at 17:23

## 2024-01-01 RX ADMIN — SODIUM CHLORIDE 55 MILLILITER(S): 9 INJECTION, SOLUTION INTRAVENOUS at 18:20

## 2024-01-01 RX ADMIN — DEXTROAMPHETAMINE SACCHARATE, AMPHETAMINE ASPARTATE, DEXTROAMPHETAMINE SULFATE AND AMPHETAMINE SULFATE 10 MILLIGRAM(S): 1.875; 1.875; 1.875; 1.875 TABLET ORAL at 10:46

## 2024-01-01 RX ADMIN — POTASSIUM PHOSPHATE, MONOBASIC POTASSIUM PHOSPHATE, DIBASIC 83.33 MILLIMOLE(S): 236; 224 INJECTION, SOLUTION INTRAVENOUS at 12:50

## 2024-01-01 RX ADMIN — RILUZOLE 50 MILLIGRAM(S): 50 TABLET ORAL at 10:24

## 2024-01-01 RX ADMIN — RILUZOLE 50 MILLIGRAM(S): 50 TABLET ORAL at 10:22

## 2024-01-01 RX ADMIN — ENOXAPARIN SODIUM 40 MILLIGRAM(S): 100 INJECTION SUBCUTANEOUS at 11:18

## 2024-01-01 RX ADMIN — Medication 10 MILLIGRAM(S): at 10:40

## 2024-01-01 RX ADMIN — ESCITALOPRAM OXALATE 10 MILLIGRAM(S): 10 TABLET, FILM COATED ORAL at 10:25

## 2024-01-01 RX ADMIN — RILUZOLE 50 MILLIGRAM(S): 50 TABLET ORAL at 09:37

## 2024-01-01 RX ADMIN — Medication 600 MILLIGRAM(S): at 12:20

## 2024-01-01 RX ADMIN — DEXTROAMPHETAMINE SACCHARATE, AMPHETAMINE ASPARTATE, DEXTROAMPHETAMINE SULFATE AND AMPHETAMINE SULFATE 5 MILLIGRAM(S): 1.875; 1.875; 1.875; 1.875 TABLET ORAL at 10:46

## 2024-01-01 RX ADMIN — MORPHINE SULFATE 2 MILLIGRAM(S): 50 CAPSULE, EXTENDED RELEASE ORAL at 13:29

## 2024-01-01 RX ADMIN — ENOXAPARIN SODIUM 40 MILLIGRAM(S): 100 INJECTION SUBCUTANEOUS at 09:36

## 2024-01-01 RX ADMIN — BUPROPION HYDROCHLORIDE 450 MILLIGRAM(S): 150 TABLET, EXTENDED RELEASE ORAL at 10:47

## 2024-01-01 RX ADMIN — LIDOCAINE 1 PATCH: 4 CREAM TOPICAL at 20:08

## 2024-01-01 RX ADMIN — ATORVASTATIN CALCIUM 10 MILLIGRAM(S): 80 TABLET, FILM COATED ORAL at 21:22

## 2024-01-01 RX ADMIN — BUPROPION HYDROCHLORIDE 450 MILLIGRAM(S): 150 TABLET, EXTENDED RELEASE ORAL at 11:06

## 2024-01-01 RX ADMIN — ATORVASTATIN CALCIUM 10 MILLIGRAM(S): 80 TABLET, FILM COATED ORAL at 20:18

## 2024-01-01 RX ADMIN — Medication 0.5 MILLIGRAM(S): at 22:04

## 2024-01-01 RX ADMIN — MORPHINE SULFATE 4 MILLIGRAM(S): 50 CAPSULE, EXTENDED RELEASE ORAL at 11:27

## 2024-01-01 RX ADMIN — Medication 10 MILLIGRAM(S): at 21:36

## 2024-01-01 RX ADMIN — Medication 10 MILLIGRAM(S): at 12:23

## 2024-01-01 RX ADMIN — ONDANSETRON 4 MILLIGRAM(S): 8 TABLET, FILM COATED ORAL at 04:45

## 2024-01-01 RX ADMIN — SODIUM CHLORIDE 75 MILLILITER(S): 9 INJECTION, SOLUTION INTRAVENOUS at 17:44

## 2024-01-01 RX ADMIN — ENOXAPARIN SODIUM 40 MILLIGRAM(S): 100 INJECTION SUBCUTANEOUS at 12:57

## 2024-01-01 RX ADMIN — Medication 25 MILLIGRAM(S): at 20:02

## 2024-01-01 RX ADMIN — Medication 650 MILLIGRAM(S): at 20:00

## 2024-01-01 RX ADMIN — Medication 1 TABLET(S): at 10:05

## 2024-01-01 RX ADMIN — RILUZOLE 50 MILLIGRAM(S): 50 TABLET ORAL at 12:21

## 2024-01-01 RX ADMIN — Medication 600 MILLIGRAM(S): at 10:25

## 2024-01-01 RX ADMIN — Medication 100 GRAM(S): at 05:47

## 2024-01-01 RX ADMIN — RILUZOLE 50 MILLIGRAM(S): 50 TABLET ORAL at 11:11

## 2024-01-01 RX ADMIN — RILUZOLE 50 MILLIGRAM(S): 50 TABLET ORAL at 20:33

## 2024-01-01 RX ADMIN — Medication 650 MILLIGRAM(S): at 21:58

## 2024-01-01 RX ADMIN — BUPROPION HYDROCHLORIDE 450 MILLIGRAM(S): 150 TABLET, EXTENDED RELEASE ORAL at 10:08

## 2024-01-01 RX ADMIN — ONDANSETRON 4 MILLIGRAM(S): 8 TABLET, FILM COATED ORAL at 11:16

## 2024-01-01 RX ADMIN — Medication 10 MILLIGRAM(S): at 10:24

## 2024-01-01 RX ADMIN — RILUZOLE 50 MILLIGRAM(S): 50 TABLET ORAL at 11:18

## 2024-01-01 RX ADMIN — ZOLPIDEM TARTRATE 5 MILLIGRAM(S): 10 TABLET ORAL at 21:34

## 2024-01-01 RX ADMIN — MORPHINE SULFATE 1 MILLIGRAM(S): 50 CAPSULE, EXTENDED RELEASE ORAL at 16:16

## 2024-01-01 RX ADMIN — Medication 400 MILLIGRAM(S): at 18:20

## 2024-01-01 RX ADMIN — ZOLPIDEM TARTRATE 5 MILLIGRAM(S): 10 TABLET ORAL at 21:25

## 2024-01-01 RX ADMIN — ESCITALOPRAM OXALATE 10 MILLIGRAM(S): 10 TABLET, FILM COATED ORAL at 11:11

## 2024-01-01 RX ADMIN — DEXTROAMPHETAMINE SACCHARATE, AMPHETAMINE ASPARTATE, DEXTROAMPHETAMINE SULFATE AND AMPHETAMINE SULFATE 5 MILLIGRAM(S): 1.875; 1.875; 1.875; 1.875 TABLET ORAL at 10:57

## 2024-01-01 RX ADMIN — MORPHINE SULFATE 2 MILLIGRAM(S): 50 CAPSULE, EXTENDED RELEASE ORAL at 07:44

## 2024-01-01 RX ADMIN — LIDOCAINE 1 PATCH: 4 CREAM TOPICAL at 16:46

## 2024-01-01 RX ADMIN — BUPROPION HYDROCHLORIDE 450 MILLIGRAM(S): 150 TABLET, EXTENDED RELEASE ORAL at 09:36

## 2024-01-01 RX ADMIN — RILUZOLE 50 MILLIGRAM(S): 50 TABLET ORAL at 21:34

## 2024-01-01 RX ADMIN — Medication 0.5 MILLIGRAM(S): at 10:24

## 2024-01-01 RX ADMIN — Medication 1 TABLET(S): at 20:02

## 2024-01-01 RX ADMIN — ENOXAPARIN SODIUM 40 MILLIGRAM(S): 100 INJECTION SUBCUTANEOUS at 10:43

## 2024-01-01 RX ADMIN — Medication 400 MILLIGRAM(S): at 01:04

## 2024-01-01 RX ADMIN — BUPROPION HYDROCHLORIDE 450 MILLIGRAM(S): 150 TABLET, EXTENDED RELEASE ORAL at 10:58

## 2024-01-01 RX ADMIN — Medication 10 MILLIGRAM(S): at 22:00

## 2024-01-01 RX ADMIN — ATORVASTATIN CALCIUM 10 MILLIGRAM(S): 80 TABLET, FILM COATED ORAL at 21:34

## 2024-01-01 RX ADMIN — Medication 650 MILLIGRAM(S): at 07:29

## 2024-01-01 RX ADMIN — ONDANSETRON 4 MILLIGRAM(S): 8 TABLET, FILM COATED ORAL at 20:12

## 2024-01-01 RX ADMIN — LIDOCAINE 1 PATCH: 4 CREAM TOPICAL at 10:32

## 2024-01-01 RX ADMIN — Medication 1 TABLET(S): at 17:25

## 2024-01-01 RX ADMIN — ONDANSETRON 4 MILLIGRAM(S): 8 TABLET, FILM COATED ORAL at 13:24

## 2024-01-01 RX ADMIN — LIDOCAINE 1 PATCH: 4 CREAM TOPICAL at 20:24

## 2024-01-01 RX ADMIN — Medication 10 MILLIGRAM(S): at 22:48

## 2024-01-01 RX ADMIN — Medication 2 MILLIGRAM(S): at 05:45

## 2024-01-01 RX ADMIN — ENOXAPARIN SODIUM 40 MILLIGRAM(S): 100 INJECTION SUBCUTANEOUS at 09:49

## 2024-01-01 RX ADMIN — ENOXAPARIN SODIUM 40 MILLIGRAM(S): 100 INJECTION SUBCUTANEOUS at 10:32

## 2024-01-01 RX ADMIN — ATORVASTATIN CALCIUM 10 MILLIGRAM(S): 80 TABLET, FILM COATED ORAL at 21:41

## 2024-01-01 RX ADMIN — ONDANSETRON 4 MILLIGRAM(S): 8 TABLET, FILM COATED ORAL at 19:37

## 2024-01-01 RX ADMIN — Medication 650 MILLIGRAM(S): at 07:31

## 2024-01-01 RX ADMIN — SENNA PLUS 2 TABLET(S): 8.6 TABLET ORAL at 22:01

## 2024-01-01 RX ADMIN — Medication 30 MILLIGRAM(S): at 14:44

## 2024-01-01 RX ADMIN — BUPROPION HYDROCHLORIDE 450 MILLIGRAM(S): 150 TABLET, EXTENDED RELEASE ORAL at 09:45

## 2024-01-01 RX ADMIN — ONDANSETRON 4 MILLIGRAM(S): 8 TABLET, FILM COATED ORAL at 05:46

## 2024-01-01 RX ADMIN — Medication 0.5 MILLIGRAM(S): at 11:44

## 2024-01-01 RX ADMIN — ATORVASTATIN CALCIUM 10 MILLIGRAM(S): 80 TABLET, FILM COATED ORAL at 20:33

## 2024-01-01 RX ADMIN — ESCITALOPRAM OXALATE 10 MILLIGRAM(S): 10 TABLET, FILM COATED ORAL at 09:46

## 2024-01-01 RX ADMIN — ATORVASTATIN CALCIUM 10 MILLIGRAM(S): 80 TABLET, FILM COATED ORAL at 22:00

## 2024-01-01 RX ADMIN — Medication 400 MILLIGRAM(S): at 10:40

## 2024-01-01 RX ADMIN — ENOXAPARIN SODIUM 40 MILLIGRAM(S): 100 INJECTION SUBCUTANEOUS at 11:06

## 2024-01-01 RX ADMIN — Medication 10 MILLIGRAM(S): at 21:55

## 2024-01-01 RX ADMIN — DEXTROAMPHETAMINE SACCHARATE, AMPHETAMINE ASPARTATE, DEXTROAMPHETAMINE SULFATE AND AMPHETAMINE SULFATE 10 MILLIGRAM(S): 1.875; 1.875; 1.875; 1.875 TABLET ORAL at 12:20

## 2024-01-01 RX ADMIN — MORPHINE SULFATE 1 MILLIGRAM(S): 50 CAPSULE, EXTENDED RELEASE ORAL at 12:44

## 2024-01-01 RX ADMIN — MORPHINE SULFATE 1 MILLIGRAM(S): 50 CAPSULE, EXTENDED RELEASE ORAL at 18:45

## 2024-01-01 RX ADMIN — SENNA PLUS 2 TABLET(S): 8.6 TABLET ORAL at 20:18

## 2024-01-01 RX ADMIN — DEXTROAMPHETAMINE SACCHARATE, AMPHETAMINE ASPARTATE, DEXTROAMPHETAMINE SULFATE AND AMPHETAMINE SULFATE 10 MILLIGRAM(S): 1.875; 1.875; 1.875; 1.875 TABLET ORAL at 09:45

## 2024-01-01 RX ADMIN — Medication 20 MILLIGRAM(S): at 12:22

## 2024-01-01 RX ADMIN — ONDANSETRON 4 MILLIGRAM(S): 8 TABLET, FILM COATED ORAL at 08:08

## 2024-01-01 RX ADMIN — Medication 62.5 MILLIMOLE(S): at 12:18

## 2024-01-01 RX ADMIN — Medication 650 MILLIGRAM(S): at 22:28

## 2024-01-01 RX ADMIN — ENOXAPARIN SODIUM 40 MILLIGRAM(S): 100 INJECTION SUBCUTANEOUS at 10:00

## 2024-01-01 RX ADMIN — Medication 400 MILLIGRAM(S): at 20:45

## 2024-01-01 RX ADMIN — BUPROPION HYDROCHLORIDE 450 MILLIGRAM(S): 150 TABLET, EXTENDED RELEASE ORAL at 10:24

## 2024-01-01 RX ADMIN — Medication 20 MILLIGRAM(S): at 11:40

## 2024-01-01 RX ADMIN — Medication 400 MILLIGRAM(S): at 03:29

## 2024-01-01 RX ADMIN — Medication 100 MILLIEQUIVALENT(S): at 14:31

## 2024-01-01 RX ADMIN — DEXTROAMPHETAMINE SACCHARATE, AMPHETAMINE ASPARTATE, DEXTROAMPHETAMINE SULFATE AND AMPHETAMINE SULFATE 10 MILLIGRAM(S): 1.875; 1.875; 1.875; 1.875 TABLET ORAL at 10:24

## 2024-01-01 RX ADMIN — SODIUM CHLORIDE 55 MILLILITER(S): 9 INJECTION, SOLUTION INTRAVENOUS at 18:21

## 2024-01-01 RX ADMIN — ONDANSETRON 4 MILLIGRAM(S): 8 TABLET, FILM COATED ORAL at 05:24

## 2024-01-01 RX ADMIN — Medication 100 GRAM(S): at 00:10

## 2024-01-01 RX ADMIN — RILUZOLE 50 MILLIGRAM(S): 50 TABLET ORAL at 21:26

## 2024-01-01 RX ADMIN — Medication 10 MILLIGRAM(S): at 10:47

## 2024-03-07 PROBLEM — G12.21 AMYOTROPHIC LATERAL SCLEROSIS: Chronic | Status: ACTIVE | Noted: 2023-01-01

## 2024-03-07 PROBLEM — E78.5 HYPERLIPIDEMIA, UNSPECIFIED: Chronic | Status: ACTIVE | Noted: 2023-01-01

## 2024-03-07 NOTE — ED ADULT NURSE REASSESSMENT NOTE - NS ED NURSE REASSESS COMMENT FT1
Pt resting comfortably in stretcher, respiration equal and unlabored. Pt placed on 2L NC for comfort when laying flat. Pt given pillows and readjusted in bed for comfort. Safety precautions in place. Pt has no complaints.

## 2024-03-07 NOTE — ED ADULT NURSE REASSESSMENT NOTE - NS ED NURSE REASSESS COMMENT FT1
Pt has PMHx of severe depression and has thoughts of suicide in 2 years after she "finishes supporting daughters through college. "Pt has plan to drive up to [place] and commit suicide there. Pt states she has no thoughts of hurting herself now or anytime soon. Pt states her depression is due to her diagnosis and inability to live independently and leave her home. MD Parker aware. Pt deemed not immediate risk for self-harm due to support system. Psych eval will be ordered by MD. Pt has PMHx of severe depression and has thoughts of suicide in 2 years after she "finishes supporting daughters through college." Pt has plan to drive up to [place] and commit suicide there in 2 years. Pt states she has no thoughts of hurting herself now or "anytime soon." Pt states her depression is due to her diagnosis and inability to live independently and leave her home. MD Parker aware. Pt deemed not immediate risk for self-harm due to support system, 1:1 constant observation not required per MD. Psych eval will be ordered by MD.

## 2024-03-07 NOTE — ED PROVIDER NOTE - PHYSICAL EXAMINATION
Constitutional: chronically ill appearing NAD AOx3  Eyes: PERRL EOMI  Head: Normocephalic atraumatic  Mouth: MMM  Cardiac: regular rate and rhythm  Resp: Lungs CTAB  GI: Abd s/nd/nt  Neuro: CN2-12 grossly intact, JARVIS x 4  Ext: no edema   Skin: No visible rashes

## 2024-03-07 NOTE — ED PROVIDER NOTE - PROGRESS NOTE DETAILS
pt with nad on cxr,  non actionable labs. trop neg. second one not needed as pt had cp all day. feels it is her anxiety. However, her daughters are coming to town Monday 3/11/24, and she is looking forward to that. Pt is ok with dc home. I offered to her that she could stay in ED overnight for SW consult in AM regarding help at home options, but pt declined. return precautions reviewed.  MD TYRON pt  expressed major depression, on meds, due to her illness. wants to be alive for her daughter's to graduate college. has support. not actively suicidal, but thinks about dying because of her ALS diagnosis.  no emergent psych consult needed.  MD TYRON d/w pt's friend Maryam. pt Is not safe at home. frequent falls (3 in last 3 weeks) and no help at home. 2 falls that required the patient to call 911.  Keyon is her Nurse coordinator [phone], [facility] Maryam MARK Passone friend [phone]  Will keep pt in ED overnight to d/w case management in the AM. MD TYRON Dr. De Paz ED attending Received signout from Dr. Barajas pending case management for possible subacute rehab placement.  Patient was seen and evaluated by case management team who states that patient lives by herself, has a motorized wheelchair to get around at home, patient is able to make decisions for herself at this time and is declining subacute rehab.  Patient's family will be in town starting Monday to help her.  On my assessment patient is AAO x 3, demonstrates ability to make decisions for herself at this time, is calm and cooperative, does not want to go to subacute rehab and states that she will look into hiring an aide to come 2 times per week for assistance.  Will discharge patient to home with resources, advised to follow-up with PCP within 1 to 2 days, given strict return cautions to the emergency department discharged home in stable condition

## 2024-03-07 NOTE — ED PROVIDER NOTE - NSFOLLOWUPINSTRUCTIONS_ED_ALL_ED_FT

## 2024-03-07 NOTE — ED ADULT TRIAGE NOTE - CHIEF COMPLAINT QUOTE
Pt brought in by ambulance from home s/p mechanical fall in her backyard. Pt not complaining of any injuries from the fall, but wanted to be brought to the ED for chest discomfort that she has had this am. Pt with hx ALS.

## 2024-03-07 NOTE — ED ADULT NURSE NOTE - OBJECTIVE STATEMENT
Pt presents to the AOx4 from home s/p fall and being unable to get back up on her own. (-) LOC (-) head strike (-) blood thinners. Pt c/o chest tightness. Pt able to JARVIS, denies chest pain, dizziness, fevers, chills, n/v/d, blurred vision or numbness/ tingling. PMHx ALS, multiple falls and severe depression.

## 2024-03-07 NOTE — ED ADULT NURSE NOTE - NSFALLRISKINTERV_ED_ALL_ED
Assistance OOB with selected safe patient handling equipment if applicable/Assistance with ambulation/Communicate fall risk and risk factors to all staff, patient, and family/Monitor gait and stability/Provide patient with walking aids/Provide visual cue: yellow wristband, yellow gown, etc/Reinforce activity limits and safety measures with patient and family/Call bell, personal items and telephone in reach/Instruct patient to call for assistance before getting out of bed/chair/stretcher/Non-slip footwear applied when patient is off stretcher/[place] to call system/Physically safe environment - no spills, clutter or unnecessary equipment/Purposeful Proactive Rounding/Room/bathroom lighting operational, light cord in reach
[facility]

## 2024-03-07 NOTE — ED PROVIDER NOTE - CLINICAL SUMMARY MEDICAL DECISION MAKING FREE TEXT BOX
64 y/o female with PMHx of ALS, HLD presents s/p fall, no complaints from fall but states she has had chest pain all day. Plan for labs, EKG, CXR, re-evaluate. 62 y/o female with PMHx of ALS, HLD presents s/p fall, no complaints from fall but states she has had chest pain all day. Plan for labs, EKG, CXR, re-evaluate.    see progress notes for further MDM details

## 2024-03-07 NOTE — ED PROVIDER NOTE - OBJECTIVE STATEMENT
62 y/o female with PMHx of HLD, ALS on radicava presents to the ED c/o upper chest pain since this morning. Pt also reports that she fell earlier today. Pt is wheelchair bound at baseline, transitions from her chair to bed, toilet and other chair and while transitioning today she fell and was unable to get herself up which is why she called EMS. Pt denies any injuries or pain from the fall., chest pain unrelated to fall today. Pt denies SOB. 64 y/o female with PMHx of HLD, ALS on radicava (wheelchair bound) presents to the ED c/o upper chest pain since this morning. Pt also reports that she fell earlier today. Pt is wheelchair bound at baseline, transitions from her chair to bed, toilet and other chair and while transitioning today she fell and was unable to get herself up which is why she called EMS. Pt denies any injuries or pain from the fall., chest pain unrelated to fall today. Pt denies SOB.

## 2024-03-07 NOTE — ED PROVIDER NOTE - PATIENT PORTAL LINK FT
You can access the FollowMyHealth Patient Portal offered by [facility] by registering at the following website: [email or web address]. By joining [employer]’s FollowMyHealth portal, you will also be able to view your health information using other applications (apps) compatible with our system. You can access the FollowMyHealth Patient Portal offered by [facility] by registering at the following website: [email or web address]. By joining [employer]’s FollowMyHealth portal, you will also be able to view your health information using other applications (apps) compatible with our system.

## 2024-03-08 NOTE — CHART NOTE - NSCHARTNOTEFT_GEN_A_CORE
Pt is a 62 y/o female with a past medical hx of ALS, HLD who is w/c bound with frequent falls at home.  Pt presented to the ER yesterday after having a fall at home when transferring from chair to bed and she was unable to get up.  Pt reports she lives alone and has two dtr that are away at college but are coming home on 3/11/2024.  Pt friend was concern for pt and wanted her to stay over night in the ER.  Hailey met with pt at bedside to introduce self, discuss role and review discharge planning need/community resources.  Pt stated, she has disability and Medicare but has been trying to get on Medicaid but has been denied.  Pt would like Medicaid so she can get onto a MLTC. Sw had pt speak to Jasmin Wakefield  in the ER who would be able to help her with Medicaid. Hailey also referred her to [facility] so she could receive follow up help in the community.  Hailey also discussed EISP Pt is a 64 y/o female with a past medical hx of ALS, HLD who is w/c bound with frequent falls at home.  Pt presented to the ER yesterday after having a fall at home when transferring from chair to bed and she was unable to get up.  Pt reports she lives alone and has two dtr that are away at college but are coming home on 3/11/2024.  Pt friend was concern for pt and wanted her to stay over night in the ER.  Uma met with pt at bedside to introduce self, discuss role and review discharge planning need/community resources.  Pt stated, she has disability and Medicare but has been trying to get on Medicaid but has been denied.  Pt would like Medicaid so she can get onto a MLTC which would provide her with aides at home. Sw had pt speak to Jasmin Wakefield  in the ER who would be able to help her with Medicaid. Sw also referred her to [facility] so she could receive follow up help in the community. UMA made referral to Tabatha from [facility] [phone].  Uma also discussed EISEP program and will refer pt to this program.  Pt reports she can pay privately up to 4 hours 1 x a week.  Pt provided with a list of private hirer.  Pt offered option of being evaluated for in patient rehab which would most likely mean she would need to be admitted.  Pt declined and stated she does not want to go to an SNF.  Pt has a ALS RN Keyon who has been assisting her.  Pt does have some friends that also assist with shopping etc. . pt does not want to be a burden to her dtrs.  Pt reports she want to go home.  Pt reports she feels safe at home. Pt provided with my contact should she have any further questions/concerns. Ambulance set up for transport home.  Case discussed with RN/MD

## 2024-03-08 NOTE — ED ADULT NURSE REASSESSMENT NOTE - NS ED NURSE REASSESS COMMENT FT1
Received report from previous RN Abida. Patient with no complaints or signs of distress noted. breathing unlabored and symmetrical. Safety measures in place.

## 2024-03-12 NOTE — ED PROVIDER NOTE - OBJECTIVE STATEMENT
64 y/o female with PMHx of HLD, ALS on radicava (wheelchair bound) presents to the ED Status post fall.  Patient notes increasing difficulty with transitions from the wheelchair to toilet.  Patient also endorses some shortness of breath and chest pain. Patient no longer feels that she can care for self at home.  Patient would like to be placed in a nursing facility.  Patient also complaining of ankle pain from fall.  Did not hit head did not lose consciousness.

## 2024-03-12 NOTE — ED PROVIDER NOTE - CLINICAL SUMMARY MEDICAL DECISION MAKING FREE TEXT BOX
Patient with ALS with worsening shortness of breath some chest pain, likely secondary to ALS however will check cardiac enzymes EKG rule out ACS, chest x-ray rule out pneumonia, screening D-dimer rule out PE, patient also with ankle pain after fall will x-ray rule out fracture.  Patient also will need admission for placement for subacute rehab/transition to long-term care.

## 2024-03-12 NOTE — ED PROVIDER NOTE - PHYSICAL EXAMINATION
GEN - NAD; well appearing; A+O x3  HEAD - NC/AT    EYES - EOMI, no conjunctival pallor, no scleral icterus  ENT -   mucous membranes  moist , no discharge  PULM rapid shallow breathing diminished bilaterally   COR -  RRR, S1 S2, no murmurs  ABD - ND, NT, soft, no guarding, no rebound, no masses    EXTREMS -right ankle with some edema at lateral mall   SKIN - no rash or bruising      NEUROLOGIC - alert, sensation nl, motor 5/5 RUE/LUE/RLE/LLE

## 2024-03-12 NOTE — ED ADULT TRIAGE NOTE - CHIEF COMPLAINT QUOTE
A&Ox4. Pt BIBEMS from home c/o intermittent chest pain. Pt reports falling about 1 hr ago from her "knees giving out". - head strike, - LOC. - blood thinners. No obvious injuries or deformities. Pt reports pain in R foot. PMHx ALS. Pt sent directly to get an EKG.

## 2024-03-13 NOTE — CONSULT NOTE ADULT - ASSESSMENT
64 y/o woman with PMHx of HLD, ALS. ADM to hospital after fall, having difficulty managing by herself. C/o difficulty swallowing, and SOB, when she lays flat.  Suggest:  Continue Radicava 105/5ml in AM fasting  Riluzole 50 mg BID daily.    Consider swallow evaluation  Consider GI consult  check O2 desat  respiratory: check VC, Insp pressure  pulmonology evaluation may need BIPAP during night  ? Placement

## 2024-03-13 NOTE — ED ADULT NURSE NOTE - COVID-19 RESULT
NEGATIVE Hemigard Postcare Instructions: The HEMIGARD strips are to remain completely dry for at least 5-7 days.

## 2024-03-13 NOTE — PROVIDER CONTACT NOTE (OTHER) - ASSESSMENT
Hx of ALS. Neuros intact. L sided weakness at baseline but no new deficits and able to follow commands. Hx of ALS. Neuros intact. L sided weakness at baseline but no new deficits and able to follow commands. vitals are stable. Zofran given given.

## 2024-03-13 NOTE — PATIENT PROFILE ADULT - FALL HARM RISK - HARM RISK INTERVENTIONS

## 2024-03-13 NOTE — CONSULT NOTE ADULT - SUBJECTIVE AND OBJECTIVE BOX
HPI:   " 64 y/o female with PMHx of HLD, ALS on radicava (wheelchair bound) presents to the ED Status post fall.  Patient notes increasing difficulty with transitions from the wheelchair to toilet.  Patient also endorses some shortness of breath and chest pain. Patient no longer feels that she can care for self at home.  Patient would like to be placed in a nursing facility.  Patient also complaining of ankle pain from fall.  Did not hit head did not lose consciousness. "      3/13  pt seenearly morning very somnolent  chart reviewed pt recieved Ambien 10mg last night.   PT able to respond but very tired/fatigued  followed simple commands          PAIN: ( )Yes   ( x)No     DYSPNEA: ( ) Yes  (x  ) No  Level:    PAST MEDICAL & SURGICAL HISTORY:  ALS (amyotrophic lateral sclerosis)      HLD (hyperlipidemia)          SOCIAL HX:    Hx opiate tolerance ( )YES  ( x)NO    Baseline ADLs  (Prior to Admission)  ( ) Independent   (xDependent    FAMILY HISTORY:      Review of Systems:     Anxiety- denies  Depression-denies  Physical Discomfort- in NAD  Dyspnea-denies  Constipation-denies  Diarrhea-denies  Nausea-denies  Vomiting-denies  Anorexia-denies  Weight Loss- denies  Cough-denies  Secretions-denies  Fatigue-+  Weakness-+  Delirium-denies    All other systems reviewed and negative       PHYSICAL EXAM:    Vital Signs Last 24 Hrs  T(C): 36.6 (13 Mar 2024 07:47), Max: 36.7 (12 Mar 2024 22:21)  T(F): 97.8 (13 Mar 2024 07:47), Max: 98 (12 Mar 2024 22:21)  HR: 87 (13 Mar 2024 07:47) (87 - 98)  BP: 135/72 (13 Mar 2024 07:47) (119/74 - 135/72)  BP(mean): --  RR: 18 (13 Mar 2024 07:47) (17 - 18)  SpO2: 95% (13 Mar 2024 07:47) (95% - 99%)    Parameters below as of 13 Mar 2024 07:47  Patient On (Oxygen Delivery Method): nasal cannula  O2 Flow (L/min): 2    Daily Height in cm: 165.1 (12 Mar 2024 22:21)    Daily     PPSV2:  30 %  FAST:    General: calm in NAD  Mental Status: awake alert oriented x3  HEENT: eomi, perrl  Lungs: ctabl b/l bs  Cardiac: s1s2 no mgr  GI: soft nontender +BS  : voids  Ext: moves all 4 extremities spontaneously  Neuro: no gross findings       LABS:                        13.3   9.07  )-----------( 229      ( 13 Mar 2024 07:59 )             44.6     03-13    143  |  104  |  28<H>  ----------------------------<  119<H>  4.0   |  37<H>  |  0.35<L>    Ca    9.5      13 Mar 2024 07:59    TPro  6.8  /  Alb  3.4  /  TBili  0.3  /  DBili  x   /  AST  17  /  ALT  37  /  AlkPhos  61  03-12    PT/INR - ( 12 Mar 2024 23:03 )   PT: 10.9 sec;   INR: 0.96 ratio         PTT - ( 12 Mar 2024 23:03 )  PTT:25.1 sec  Albumin: Albumin: 3.4 g/dL (03-12 @ 23:03)      Allergies    No Known Allergies    Intolerances      MEDICATIONS  (STANDING):  amphetamine/dextroamphetamine 5 milliGRAM(s) Oral daily  atorvastatin 10 milliGRAM(s) Oral at bedtime  buPROPion XL (24-Hour) . 450 milliGRAM(s) Oral daily  escitalopram 10 milliGRAM(s) Oral daily  zolpidem 10 milliGRAM(s) Oral at bedtime    MEDICATIONS  (PRN):      RADIOLOGY/ADDITIONAL STUDIES:   HPI:   " 62 y/o female with PMHx of HLD, ALS on radicava (wheelchair bound) presents to the ED Status post fall.  Patient notes increasing difficulty with transitions from the wheelchair to toilet.  Patient also endorses some shortness of breath and chest pain. Patient no longer feels that she can care for self at home.  Patient would like to be placed in a nursing facility.  Patient also complaining of ankle pain from fall.  Did not hit head did not lose consciousness. "      3/13  pt seenearly morning very somnolent  chart reviewed pt recieved Ambien 10mg last night.   PT able to respond but very tired/fatigued  followed simple commands    later she was eating breakfast reports sleepyness.  typically if she uses ambien she takes 5mg OR will take melatonin  Both ordered PRN as patient will let us know when she needs this.     She doesnt like to take ambien every day but has been on it for some time.       Goals of care and Advanced directives discussed        PAIN: ( )Yes   ( x)No     DYSPNEA: ( ) Yes  (x  ) No  Level:    PAST MEDICAL & SURGICAL HISTORY:  ALS (amyotrophic lateral sclerosis)      HLD (hyperlipidemia)          SOCIAL HX:    Hx opiate tolerance ( )YES  ( x)NO    Baseline ADLs  (Prior to Admission)  ( ) Independent   (xDependent    FAMILY HISTORY:      Review of Systems:     Anxiety- denies  Depression-denies  Physical Discomfort- in NAD  Dyspnea-denies  Constipation-denies  Diarrhea-denies  Nausea-denies  Vomiting-denies  Anorexia-+  Weight Loss- +  Cough-denies  Secretions-denies  Fatigue-+  Weakness-+  Delirium-denies    All other systems reviewed and negative       PHYSICAL EXAM:    Vital Signs Last 24 Hrs  T(C): 36.6 (13 Mar 2024 07:47), Max: 36.7 (12 Mar 2024 22:21)  T(F): 97.8 (13 Mar 2024 07:47), Max: 98 (12 Mar 2024 22:21)  HR: 87 (13 Mar 2024 07:47) (87 - 98)  BP: 135/72 (13 Mar 2024 07:47) (119/74 - 135/72)  BP(mean): --  RR: 18 (13 Mar 2024 07:47) (17 - 18)  SpO2: 95% (13 Mar 2024 07:47) (95% - 99%)    Parameters below as of 13 Mar 2024 07:47  Patient On (Oxygen Delivery Method): nasal cannula  O2 Flow (L/min): 2    Daily Height in cm: 165.1 (12 Mar 2024 22:21)    Daily     PPSV2:  30 %  FAST:    General: calm in NAD  Mental Status: awake alert oriented x3  HEENT: eomi, perrl  Lungs: ctabl b/l bs  Cardiac: s1s2 no mgr  GI: soft nontender +BS  : voids  Ext: moves all 4 extremities spontaneously  Neuro: no gross findings       LABS:                        13.3   9.07  )-----------( 229      ( 13 Mar 2024 07:59 )             44.6     03-13    143  |  104  |  28<H>  ----------------------------<  119<H>  4.0   |  37<H>  |  0.35<L>    Ca    9.5      13 Mar 2024 07:59    TPro  6.8  /  Alb  3.4  /  TBili  0.3  /  DBili  x   /  AST  17  /  ALT  37  /  AlkPhos  61  03-12    PT/INR - ( 12 Mar 2024 23:03 )   PT: 10.9 sec;   INR: 0.96 ratio         PTT - ( 12 Mar 2024 23:03 )  PTT:25.1 sec  Albumin: Albumin: 3.4 g/dL (03-12 @ 23:03)      Allergies    No Known Allergies    Intolerances      MEDICATIONS  (STANDING):  amphetamine/dextroamphetamine 5 milliGRAM(s) Oral daily  atorvastatin 10 milliGRAM(s) Oral at bedtime  buPROPion XL (24-Hour) . 450 milliGRAM(s) Oral daily  escitalopram 10 milliGRAM(s) Oral daily  zolpidem 10 milliGRAM(s) Oral at bedtime    MEDICATIONS  (PRN):      RADIOLOGY/ADDITIONAL STUDIES:

## 2024-03-13 NOTE — ED ADULT NURSE NOTE - NSFALLLASTDATE_ED_ALL_ED_DT
58 year old M, smoker (30y x 1ppd), with PMHx of HLD , who is referred by Dr. Rosangela Gonsalez for an initial evaluation of an enlarging ground-glass nodule in the right lower lobe.    Patient has had chronic unproductive cough, worse if smoking. There's no unintentional weight loss, headache, anorexia, fatigue, chest pain, dyspnea, or hemoptysis.    CT chest on 07/05/2023 was reviewed and compared to the prior study. Explained to the patient that the ground glass nodule in the RLL adjacent to an anterior thoracic osteophyte had been increasing in size, which bg up the concern of malignancy. I suggested surgical resection for definitive treatment and diagnosis.    The risks, benefits and alternatives of proceeding with Right Video-Assisted Thoracoscopic Surgery, Robotic Assisted, Right lower lobe segmentectomy, possible lobectomy and MLND were discussed with the patient. Specifically, the risk of bleeding, need for a blood transfusion, conversion to an open procedure, prolonged air leak, dysrhythmia, myocardial infarction, deep venous thrombosis, pulmonary embolus, postoperative pain syndrome, infection, risk of recurrent symptoms, need for continued follow-up and management, as well as mortality was discussed with the patient, who understood and agreed to the above.    Patient was suggested to stop smoking as soon as possible to avoid postoperative complications.    Plan:  - PET/CT to determine suspicion for malignancy and exclude extra-thoracic disease.  - Medical clearance  - PST and EKG with PCP  - Smoking cessation  
13-Mar-2024 01:35

## 2024-03-13 NOTE — ED ADULT NURSE NOTE - OBJECTIVE STATEMENT
Patient is a 63 yr old female came in complaining of chest pain. At this time patient reports no chest pain. Patient reports falling at home today, states "her knees gave out." Patient with hx of multiple falls, ALS. Patient reports - headstrike, -LOC. Patient states she cannot take care of herself at home anymore. patient would like to speak to  and get into a nursing home.

## 2024-03-13 NOTE — PROVIDER CONTACT NOTE (OTHER) - ACTION/TREATMENT ORDERED:
MD informed. Ordering PRN tylenol 650mg and adding neruo checks q4h routine. cont to monitor for improvement.

## 2024-03-13 NOTE — PHYSICAL THERAPY INITIAL EVALUATION ADULT - DIAGNOSIS, PT EVAL
S/p mult fall, progressive ALS. S/p mult fall, progressive ALS. Rt ankle pain, foot drop LLE (splint+)

## 2024-03-13 NOTE — GOALS OF CARE CONVERSATION - ADVANCED CARE PLANNING - CONVERSATION DETAILS
HPI:  62 y/o female with PMHx of HLD, ALS on radicava (wheelchair bound) presents to the ED Status post fall.  Patient notes increasing difficulty with transitions from the wheelchair to toilet.  Patient also endorses some shortness of breath and chest pain. Patient no longer feels that she can care for self at home.  Patient would like to be placed in a nursing facility.  Patient also complaining of ankle pain from fall.  Did not hit head did not lose consciousness. (13 Mar 2024 03:49)      PERTINENT PMH REVIEWED:  [ ] YES [ ] NO           Primary Contact:                   Relationship:                         HCP/Surrogate:                 Phone Number:    HCP [  ] Surrogate [   ] Guardian [   ]    Mental Status: [ ] Alert  [  ] Oriented [  ] Confused [  ] Lethargic  Concerns of Depression [  ]  Anxiety [   ]  Baseline ADLs (prior to admission):  Independent [ ] moderately [ ] fully   Dependent   [ ] moderately [ ]fully    Family Meeting attendees:    Anticipated Grief: Patient[  ] Family [  ]    Caregiver [place] Assessed: Yes [  ] No [  ]    Jainism:    Spiritual Concerns:    Goals of Care: Comfort [  ] Rehabilitation [  ] Curative [  ] Life Prolonging [  ]    Previous Services:    ADVANCE DIRECTIVES:    -Pt has capacity  -Reports she has HCP at home which names her daughter and friend.  -MOLST DNR DNI trial NIV, send to the hospital, limited medical interventions, no feeding tube, use IV fluids, use antibiotics    Anticipated D/C Plan: [facility]                     Summary:  Palliative SW met with Pt at the bedside to discuss GOC, assist with planning and provide supportive counseling.  Palliative SW role explained.  Emotional support provided.  Pt. is alert and oriented, able to make needs known.  Pt. reports history of depression.  Pts feelings explored in details and support provided.  Prior to hospitalization, Pt resided at home on her own.  Pt. was wheelchair bound but states she was able to stand and pivot.  Pt. shared her journey with her illness including when she was diagnosed with ALS in 2021 to present day.  Pt shared what she has discussed with the medical team regarding her current medical condition.  Pts feelings explored.  Support provided. HPI:  64 y/o female with PMHx of HLD, ALS on radicava (wheelchair bound) presents to the ED Status post fall.  Patient notes increasing difficulty with transitions from the wheelchair to toilet.  Patient also endorses some shortness of breath and chest pain. Patient no longer feels that she can care for self at home.  Patient would like to be placed in a nursing facility.  Patient also complaining of ankle pain from fall.  Did not hit head did not lose consciousness. (13 Mar 2024 03:49)      PERTINENT PMH REVIEWED:  [x  ] YES [ ] NO           Primary Contact:    Christine [phone]    HCP [  ] Surrogate [   ] Guardian [   ]    Mental Status: [x ] Alert  [ x ] Oriented [  ] Confused [  ] Lethargic  Concerns of Depression [  ] -reports history of feelings related  Anxiety [   ] -none reported  Baseline ADLs (prior to admission):  Independent [ x ] moderately [ ] fully   Dependent   [ ] moderately [ ]fully    Family Meeting attendees: GOC discussed with Pt    Anticipated Grief: Patient[ x ] Family [ x ]    Caregiver [place] Assessed: Yes [x  ] No [  ]    Quaker: Decline    Spiritual Concerns: Not identified,  available for support.    Goals of Care: Current medical management, quality of life    Previous Services: None    ADVANCE DIRECTIVES:    -Pt has capacity  -Reports she has HCP at home which names her daughter and friend.  -MOLST DNR DNI trial NIV, send to the hospital, limited medical interventions, no feeding tube, use IV fluids, use antibiotics    Anticipated D/C Plan: [facility]                     Summary:  Palliative SW met with Pt at the bedside to discuss GOC, assist with planning and provide supportive counseling.  Palliative SW role explained.  Emotional support provided.  Pt. is alert and oriented, able to make needs known.  Pt. reports history of depression.  Pts feelings explored in details and support provided.  Prior to hospitalization, Pt resided at home on her own.  Pt. was wheelchair bound but states she was able to stand and pivot.  Pt. shared her journey with her illness including when she was diagnosed with ALS in 2021 to present day.  Pt shared what she has discussed with the medical team regarding her current medical condition.  Pts feelings explored.  Support provided.    Advance directives reviewed.  Pt. shared she has a HCP at home which names her daughter and friend at home, she desires to review the document she has.  We discussed Pts wishes regarding resuscitation and intubation.  Pt. shared that in the event her heart stops she desires a natural passing.  She notes she does not want aggressive interventions such as a feeding tube as well.  TERESA completed to reflect DNR DNI trial NIV, send to the hospital, limited medical interventions, no feeding tube, use IV fluids, use antibiotics.      Plan for [facility].  Will continue to follow up to offer support.  Emotional support provided.  Our team to continue to follow.

## 2024-03-13 NOTE — CONSULT NOTE ADULT - ASSESSMENT
Process of Care  --Reviewed dx/treatment problems and alignment with Goals of Care    Physical Aspects of Care  --Pain  patient denies at this time  c/w current managment    --Bowel Regimen  denies constipation  risk for constipation d/t immobility  daily dulcolax    --Dyspnea  No SOB at this time  comfortable and in NAD    --Nausea Vomiting  denies    --Weakness  PT as tolerated     Psychological and Psychiatric Aspects of Care:   --Greif/Bereavment: emotional support provided  --Hx of psychiatric dx: none  -Pastoral Care Available PRN     Social Aspects of Care  -SW involved     Cultural Aspects  -Primary Language: English    Goals of Care:     We discussed Palliative Care team being a supportive team when a patient has ongoing illnesses.  We also discussed that it is not an end of life care service, but can help navigate symptoms and emotional support througout their hospital stay here.    Hospice was explained as well  as an end of life care philosophy.  When a disease cannot be cured, or family/patient decide the treatment burdens out weigh the risk and one choses to change focus of treatment from cure to quality/comfort.       Prognosis: Death can occur at anytime, but if disease continues to progress naturally patient likely has days to weeks.    Ethical and Legal Aspects:   NA        Capacity:   HCP/Surrogate:   Code Status:  MOLST:  Dispo Plan:    Discussed With: Case coordinated with attending and SW and RN     Time Spent: 90 minutes including the care, coordination and counseling of this patient, 50% of which was spent coordinating and counseling.  Process of Care  --Reviewed dx/treatment problems and alignment with Goals of Care    Physical Aspects of Care  --Pain  patient denies at this time  c/w current managment    --Bowel Regimen  denies constipation  risk for constipation d/t immobility  daily dulcolax    --Dyspnea  No SOB at this time  comfortable and in NAD    --Nausea Vomiting  denies    --Weakness  PT as tolerated     --insomnia  melatonin 5mg Qhs prn   discontinued Ambien 10mg.   Ambien 5mg QHS PRN (home dose)      Psychological and Psychiatric Aspects of Care:   --Greif/Bereavment: emotional support provided  --Hx of psychiatric dx: none  -Pastoral Care Available PRN     Social Aspects of Care  -SW involved     Cultural Aspects  -Primary Language: English    Goals of Care:     We discussed Palliative Care team being a supportive team when a patient has ongoing illnesses.  We also discussed that it is not an end of life care service, but can help navigate symptoms and emotional support througout their hospital stay here.       Ethical and Legal Aspects:   NA        Capacity: pt w/ capacity  HCP/Surrogate: daughters are surrogate if no hcp filled out  Code Status:  MOLST:  Dispo Plan:    Discussed With: Case coordinated with attending and SW and RN     Time Spent: 90 minutes including the care, coordination and counseling of this patient, 50% of which was spent coordinating and counseling.

## 2024-03-13 NOTE — H&P ADULT - NSHPPHYSICALEXAM_GEN_ALL_CORE
T(C): 36.6 (03-13-24 @ 05:51), Max: 36.7 (03-12-24 @ 22:21)  HR: 92 (03-13-24 @ 05:51) (92 - 98)  BP: 119/74 (03-13-24 @ 05:51) (119/74 - 123/89)  RR: 18 (03-13-24 @ 05:51) (17 - 18)  SpO2: 99% (03-13-24 @ 05:51) (97% - 99%)    CONSTITUTIONAL:  Sleeping easily awaken.  Took Ambien, not able to stay awake.   EYES: PERRLA and symmetric, EOMI.   ENMT: Oral mucosa - dry mucous membrane             NECK: Supple, symmetric and without tracheal deviation   RESP: No respiratory distress, no use of accessory muscles  CV: RRR, +S1S2, no MRG; no JVD; no peripheral edema  GI: Soft, NT, ND, no rebound, no guarding; no palpable masses.   LYMPH: No cervical LAD or tenderness; no axillary LAD or tenderness; no inguinal LAD or tenderness  MSK: Not assessed.   SKIN: No rashes or ulcers visually seen.   NEURO: Not assessed   PSYCH: Appropriate insight/judgment; A+O x 3, mood and affect appropriate

## 2024-03-13 NOTE — CONSULT NOTE ADULT - SUBJECTIVE AND OBJECTIVE BOX
Neurology Consult requested by:   Patient is a 63y old  Female who presents with a chief complaint of Fall, Weakness (13 Mar 2024 07:12)     HPI:   64 y/o woman with PMHx of HLD, ALS dx in 2021, followed at [facility], on Radicava (wheelchair bound) presents to the ED Status post fall.  Patient notes increasing difficulty with transitions from the wheelchair to toilet.  Patient also endorses some shortness of breath and chest pain. At times difficult to swallow.       PAST MEDICAL & SURGICAL HISTORY:  ALS (amyotrophic lateral sclerosis)      HLD (hyperlipidemia)        FAMILY HISTORY:    Social Hx:  Nonsmoker, no drug or alcohol use  Medications and Allergies ReviewedMEDICATIONS  (STANDING):  amphetamine/dextroamphetamine 5 milliGRAM(s) Oral daily  atorvastatin 10 milliGRAM(s) Oral at bedtime  buPROPion XL (24-Hour) . 450 milliGRAM(s) Oral daily  escitalopram 10 milliGRAM(s) Oral daily  riluzole 50 milliGRAM(s) Oral two times a day     ROS: Pertinent positives in HPI, all other ROS were reviewed and are negative.      Examination:   Vital Signs Last 24 Hrs  T(C): 36.6 (13 Mar 2024 07:47), Max: 36.7 (12 Mar 2024 22:21)  T(F): 97.8 (13 Mar 2024 07:47), Max: 98 (12 Mar 2024 22:21)  HR: 87 (13 Mar 2024 07:47) (87 - 98)  BP: 135/72 (13 Mar 2024 07:47) (119/74 - 135/72)  RR: 18 (13 Mar 2024 07:47) (17 - 18)  SpO2: 95% (13 Mar 2024 07:47) (95% - 99%)    Parameters below as of 13 Mar 2024 07:47  Patient On (Oxygen Delivery Method): nasal cannula  O2 Flow (L/min): 2    General: Cooperative, NAD   NECK: supple, no masses  ENT: Normal hearing   Vascular : no carotid bruits,   Lungs: CTAB  Chest: RRR, no murmurs  Extremities: nontender, no edema  Musculoskeletal: no adventitious movements, no joint stiffness  Skin: no rash    Neurological Examination:  NIHSS:0  MS: AOx3. Appropriately interactive, normal affect. Speech fluent but soft, can name and repeat   CN:  PERLL, EOMI, V1-3 sensation intact, face symmetric, hearing intact, palate elevates symmetrically, tongue midline, SCM mildly weak bilaterally  Motor: distal wasting, normal tone, no tremor, rigidity or bradykinesia.  UEs prox: 4/5,distal 3/5. LEs ~ 2/5 proximally, distally 0/5   Sens: Intact to light touch.    Reflexes: 0/4 all over, mute toes b/l  Coord:  No dysmetria   Gait: Cannot test    Labs: Reviewed  Basic Metabolic Panel (03.13.24 @ 07:59)   Sodium: 143 mmol/L  Potassium: 4.0 mmol/L  Chloride: 104 mmol/L  Carbon Dioxide: 37 mmol/L  Anion Gap: 2 mmol/L  Blood Urea Nitrogen: 28 mg/dL  Creatinine: 0.35 mg/dL  Glucose: 119 mg/dL  Calcium: 9.5 mg/dL  eGFR: 116:     Complete Blood Count Repeat Every 24 Hours X 3 Days (03.13.24 @ 07:59)   WBC Count: 9.07 K/uL  RBC Count: 5.24 M/uL  Hemoglobin: 13.3 g/dL  Hematocrit: 44.6 %  Mean Cell Volume: 85.1 fl  Mean Cell Hemoglobin: 25.4 pg  Mean Cell Hemoglobin Conc: 29.8 gm/dL  Red Cell Distrib Width: 15.7 %  Platelet Count - Automated: 229 K/uL

## 2024-03-13 NOTE — ED ADULT NURSE NOTE - NSFALLHARMRISKINTERV_ED_ALL_ED

## 2024-03-13 NOTE — PHYSICAL THERAPY INITIAL EVALUATION ADULT - PATIENT/FAMILY/SIGNIFICANT OTHER GOALS STATEMENT, PT EVAL
placement for subacute rehab/transition to long-term care Placement for subacute rehab/transition to long-term care

## 2024-03-13 NOTE — CONSULT NOTE ADULT - CONVERSATION DETAILS
We discussed Palliative Care team being a supportive team when a patient has ongoing illnesses.  We also discussed that it is not an end of life care service, but can help navigate symptoms and emotional support throughout their hospital stay here.     Hospice was explained as well as an end of life care philosophy. When a disease cannot be cured, or family/patient decide the treatment burdens out weight the risk and chose to change focus of treatment from cure to quality/comfort.       We discussed at length patients underlying clinical diagnosis at length.  We discussed resuscitation and risk and benefits of CPR. Risks include, broken ribs, lung puncture, pain and discomfort.   At this time due to patients underlying irreversible diagnosis of ALS I would not recommend CPR because it would not reverse current medical condition.  They understand that DNR means NO CPR and that would allow natural death.  No CPR means no attempt to restart the heart once it has stopped.  Patient verbalized understanding.     We also discussed mechanical ventilation in  an event that they could no longer breath on their own.  Risk and benefits of mechanical ventilation were discussed at length.  At this time, due to patients irreversible medical condition of als it is of concern that if patient were to be intubated, extubation may not be possible.  Also intubation would not reverse current medical condition which if progresses its natural course would lead to the patients death.  Patient was able to verbalized understanding.     She states she's had this dicsusion with people before.

## 2024-03-13 NOTE — H&P ADULT - ASSESSMENT
62 y/o female with PMHx of HLD, ALS on radicava (wheelchair bound) presents to the ED Status post fall.     # Fall  - 2ndy to progressive ALS  64 y/o female with PMHx of HLD, ALS on radicava (wheelchair bound) presents to the ED Status post fall.     # Fall  - 2ndy to progressive ALS.   - CXR: Pending Official read. No acute cardiopulmonary process.   - Right Foot XR: Pending Official read. Read by ED physician; no acute fx.   - Right Ankle XR: Pending Official read. Read by ED physician; no acute fx.   - RPP (negative)   - Appreciate physical therapy  - Pending Case Management to assess with placement.     # ALS  - Appears to be progressive.   - Medications reviewed by Pharmacist not on Formulary: Radicava 105/5ml  and Riluzole 50mg BID daily.  Defer to daytime hospitalist to possible have medications picked up from home.     # Psych d/o  - Per [facility], Noted on total of 450mg daily of Wellbutrin XR.  Lexapro 10mg daily.  Previously Prozac 10mg/ 20mg per pharmacy.  Adderall 5mg q AM.     # HLD  - pending lipid panel   -continue lipitor

## 2024-03-13 NOTE — H&P ADULT - NSHPLABSRESULTS_GEN_ALL_CORE
CBC:            13.5   9.00  )-----------( 230      ( 03-12-24 @ 23:03 )             44.1         Chem:         ( 03-12-24 @ 23:03 )    144  |  106  |  28<H>  ----------------------------<  123<H>  3.8   |  35<H>  |  0.60        Liver Functions: ( 03-12-24 @ 23:03 )  Alb: 3.4 g/dL / Pro: 6.8 gm/dL / ALK PHOS: 61 U/L / ALT: 37 U/L / AST: 17 U/L / GGT: x              Type & Screen:

## 2024-03-13 NOTE — PHYSICAL THERAPY INITIAL EVALUATION ADULT - GENERAL OBSERVATIONS, REHAB EVAL
Pt was found lying in bed on 2 lits of o2, pt appears very tired and weak, willing to participate in PT, c/o Rt ankle pain and Rt foot drop which is new as per pt(pain related?)

## 2024-03-13 NOTE — PROVIDER CONTACT NOTE (OTHER) - SITUATION
left message with answering service for routine consult with Dr. Padgett.
left message with [facility] answering service for morning pulmonary consult.
Pt c/o headache and nausea.

## 2024-03-13 NOTE — PROGRESS NOTE ADULT - SUBJECTIVE AND OBJECTIVE BOX
64 y/o female with PMHx of HLD, ALS on radicava (wheelchair bound) presents to the ED Status post fall.   3/13 - spoke with patient at length with dr berry.  she states she has been slowly deteriorating pt states she spoke with palliative.    pt agreeable to rehab.  urged patient to bring in ALS med form home.      Vital Signs Last 24 Hrs  T(C): 36.6 (13 Mar 2024 07:47), Max: 36.7 (12 Mar 2024 22:21)  T(F): 97.8 (13 Mar 2024 07:47), Max: 98 (12 Mar 2024 22:21)  HR: 87 (13 Mar 2024 07:47) (87 - 98)  BP: 135/72 (13 Mar 2024 07:47) (119/74 - 135/72)  BP(mean): --  RR: 18 (13 Mar 2024 07:47) (17 - 18)  SpO2: 95% (13 Mar 2024 07:47) (95% - 99%)    Parameters below as of 13 Mar 2024 07:47  Patient On (Oxygen Delivery Method): nasal cannula  O2 Flow (L/min): 2        # Fall  - 2ndy to progressive ALS.   - CXR: Pending Official read. No acute cardiopulmonary process.   - Right Foot XR: no fx  - Right Ankle XR: no fx  - RPP (negative)   - Appreciate physical therapy,   - Pending Case Management to assess with placement.   - pulmonary eval  - speech and swallow eval  - GI eval for possible PEG - d/w yoli barrios     # ALS  - Appears to be progressive.   - Medications reviewed by Pharmacist not on Formulary: Radicava 105/5ml  and Riluzole 50mg BID daily.  Defer to daytime hospitalist to possible have medications picked up from home.     # Psych d/o  - Per [facility], Noted on total of 450mg daily of Wellbutrin XR.  Lexapro 10mg daily.  Previously Prozac 10mg/ 20mg per pharmacy.  Adderall 5mg q AM.     # HLD  - pending lipid panel   -continue lipitor             64 y/o female with PMHx of HLD, ALS on radicava (wheelchair bound) presents to the ED Status post fall.   3/13 - spoke with patient at length with dr berry.  she states she has been slowly deteriorating pt states she spoke with palliative.    pt agreeable to rehab.  urged patient to bring in ALS med form home.      Vital Signs Last 24 Hrs  T(C): 36.6 (13 Mar 2024 07:47), Max: 36.7 (12 Mar 2024 22:21)  T(F): 97.8 (13 Mar 2024 07:47), Max: 98 (12 Mar 2024 22:21)  HR: 87 (13 Mar 2024 07:47) (87 - 98)  BP: 135/72 (13 Mar 2024 07:47) (119/74 - 135/72)  BP(mean): --  RR: 18 (13 Mar 2024 07:47) (17 - 18)  SpO2: 95% (13 Mar 2024 07:47) (95% - 99%)    Parameters below as of 13 Mar 2024 07:47  Patient On (Oxygen Delivery Method): nasal cannula  O2 Flow (L/min): 2    GEN: lying in bed, NAD  HEENT:   NC/AT, pupils equal and reactive, EOMI  CV:  +S1, +S2, RRR  RESP:   lungs clear to auscultation bilaterally, no wheeze, rales, rhonchi   BREAST:  not examined  GI:  abdomen soft, non-tender, non-distended, normoactive BS  MSK:   normal muscle tone  EXT: TTP to right ankle   NEURO:  AAOX3, soft voice, Motor: distal wasting, normal tone, no tremor, no rigidity,  UEs prox: 4/5,distal 3/5. LEs ~ 2/5 proximally, distally 0/5   SKIN:  no rashes                              13.3   9.07  )-----------( 229      ( 13 Mar 2024 07:59 )             44.6     03-13    143  |  104  |  28<H>  ----------------------------<  119<H>  4.0   |  37<H>  |  0.35<L>    Ca    9.5      13 Mar 2024 07:59    TPro  6.8  /  Alb  3.4  /  TBili  0.3  /  DBili  x   /  AST  17  /  ALT  37  /  AlkPhos  61  03-12        LIVER FUNCTIONS - ( 12 Mar 2024 23:03 )  Alb: 3.4 g/dL / Pro: 6.8 gm/dL / ALK PHOS: 61 U/L / ALT: 37 U/L / AST: 17 U/L / GGT: x           PT/INR - ( 12 Mar 2024 23:03 )   PT: 10.9 sec;   INR: 0.96 ratio         PTT - ( 12 Mar 2024 23:03 )  PTT:25.1 sec  Urinalysis Basic - ( 13 Mar 2024 07:59 )    Color: x / Appearance: x / SG: x / pH: x  Gluc: 119 mg/dL / Ketone: x  / Bili: x / Urobili: x   Blood: x / Protein: x / Nitrite: x   Leuk Esterase: x / RBC: x / WBC x   Sq Epi: x / Non Sq Epi: x / Bacteria: x      < from: Xray Chest 1 View- PORTABLE-Urgent (03.12.24 @ 23:22) >  FINDINGS:    There is no fracture, dislocation, soft tissue swelling or joint effusion.  Moderate sized retrocalcaneal and small plantar spurs  Moderate hammertoe changes second through fifth digits  No radiopaque foreign body.      Portable chest 11:16 PM    COMPARISON: 12/27/2023    Overlying EKG leads and wires    FINDINGS:  Heart/Vascular: The heart size, mediastinum, hilum and aorta are within   normal limits for projection.  Pulmonary: Midline trachea. There is no congestion or effusion.  Bones: There is nofracture.  Lines and catheter: None    Impression:    Mild atelectasis at the bases    No fracture.    --- End of Report ---        < end of copied text >          # Fall  - 2ndy to progressive ALS.   - CXR: Pending Official read. No acute cardiopulmonary process.   - Right Foot XR: no fx  - Right Ankle XR: no fx  - RPP (negative)   - Appreciate physical therapy,   - Pending Case Management to assess with placement.   - pulmonary eval  - speech and swallow eval  - GI eval for possible PEG - d/w yoli barrios     # ALS  - Appears to be progressive.   - Medications reviewed by Pharmacist not on Formulary: Radicava 105/5ml  and Riluzole 50mg BID daily (patient brought in her own - sent to pharmacy)  - s/s eval  - pulm eval  - neuro eval can f/u with her ALS specialist at [facility] in the city    # Psych d/o  - Per [facility], Noted on total of 450mg daily of Wellbutrin XR.  Lexapro 10mg daily.  Previously Prozac 10mg/ 20mg per pharmacy.  Adderall 5mg q AM.     # HLD  - pending lipid panel   -continue lipitor

## 2024-03-13 NOTE — H&P ADULT - HISTORY OF PRESENT ILLNESS
62 y/o female with PMHx of HLD, ALS on radicava (wheelchair bound) presents to the ED Status post fall.  Patient notes increasing difficulty with transitions from the wheelchair to toilet.  Patient also endorses some shortness of breath and chest pain. Patient no longer feels that she can care for self at home.  Patient would like to be placed in a nursing facility.  Patient also complaining of ankle pain from fall.  Did not hit head did not lose consciousness.

## 2024-03-14 NOTE — SWALLOW BEDSIDE ASSESSMENT ADULT - COMMENTS
The pt was admitted to  status post fall preceded by progressive weakness. She has an underlying history of ALD diagnosed in 2021 as well as hyperlipidemia. The pt was admitted to  status post fall preceded by progressive weakness. Hospital course is remarkable for hypercapnia/respiratory failure for which she is on Bipap. She has an underlying history of ALD diagnosed in 2021 as well as hyperlipidemia.    I ATTEMPTED TO SEE PATIENT FOR SWALLOW CONSULT ON 3 OCCASIONS TODAY. PT IS CURRENTLY POORLY RESPONSIVE AND IN A HYPERCAPNIC STATE WARRANTING BIPAP. NOT CANDIDATE FOR SWALLOW TESTING AT THIS TIME. D/W DR CHOWDARY, WILL DEFER SWALLOW TESTING FOR TODAY AND WILL REATTEMPT TOMORROW PENDING IMPROVEMENT IN RESPONSIVENESS/PULMONARY FUNCTION.

## 2024-03-14 NOTE — PROGRESS NOTE ADULT - SUBJECTIVE AND OBJECTIVE BOX
Chart and meds reviewed.  Patient seen and examined.    3/14 - this morning patient lethargic and minimally arousable, spoke with Dr. Arciniega, ordered stat ABG.  ABG came back with pH 7.2 and PCO2 of 111.  Patient noted to be DNR/DNI with trial of NIV.  Patient started immediately on BiPAP, spoke with respiratory.  Also updated patient's daughter Carrie of the new acute issue with the elevated PCO2 and need for urgent NIV trial.    All other systems reviewed and found to be negative with the exception of what has been described above.    MEDICATIONS  (STANDING):  amphetamine/dextroamphetamine 5 milliGRAM(s) Oral daily  atorvastatin 10 milliGRAM(s) Oral at bedtime  buPROPion XL (24-Hour) . 450 milliGRAM(s) Oral daily  escitalopram 10 milliGRAM(s) Oral daily  Radicava 105 mG/Day 5 milliLiter(s) Oral daily  riluzole 50 milliGRAM(s) Oral two times a day    MEDICATIONS  (PRN):  acetaminophen     Tablet .. 650 milliGRAM(s) Oral every 6 hours PRN Mild Pain (1 - 3)  melatonin 5 milliGRAM(s) Oral at bedtime PRN Insomnia  ondansetron Injectable 4 milliGRAM(s) IV Push every 4 hours PRN Nausea and/or Vomiting  zolpidem 5 milliGRAM(s) Oral at bedtime PRN Insomnia    VITAL SIGNS:  T(F): 97.6 (03-14-24 @ 07:41), Max: 98.3 (03-13-24 @ 16:00)  HR: 98 (03-14-24 @ 07:41) (93 - 104)  BP: 135/73 (03-14-24 @ 07:41) (109/67 - 135/73)  RR: 18 (03-14-24 @ 07:41) (17 - 18)  SpO2: 95% (03-14-24 @ 00:10) (95% - 98%)    PHYSICAL EXAM:  HEENT:  pupils equal and reactive, EOMI, no oropharyngeal lesions, erythema, exudates, oral thrush  NECK:   supple, no carotid bruits, no palpable lymph nodes, no thyromegaly  CV:  +S1, +S2, regular, no murmurs  RESP:   lungs clear to auscultation bilaterally, no wheezing, rales, rhonchi, good air entry bilaterally  GI:  abdomen soft, non-tender, non-distended, normal BS  EXT:  no clubbing, no cyanosis, no edema, no calf pain, swelling or erythema  NEURO:  lethargic, minimally arousable, responded to questions with 1 word answers and then falls back to sleep  SKIN:  no ulcers, lesions or rashes    LABS:                        12.8   9.11  )-----------( 225      ( 14 Mar 2024 07:51 )             44.4     03-13    143  |  104  |  28<H>  ----------------------------<  119<H>  4.0   |  37<H>  |  0.35<L>    Ca    9.5      13 Mar 2024 07:59    TPro  6.8  /  Alb  3.4  /  TBili  0.3  /  DBili  x   /  AST  17  /  ALT  37  /  AlkPhos  61  03-12    LIVER FUNCTIONS - ( 12 Mar 2024 23:03 )  Alb: 3.4 g/dL / Pro: 6.8 gm/dL / ALK PHOS: 61 U/L / ALT: 37 U/L / AST: 17 U/L / GGT: x           PT/INR - ( 12 Mar 2024 23:03 )   PT: 10.9 sec;   INR: 0.96 ratio      PTT - ( 12 Mar 2024 23:03 )  PTT:25.1 sec  Urinalysis Basic - ( 13 Mar 2024 07:59 )    Color: x / Appearance: x / SG: x / pH: x  Gluc: 119 mg/dL / Ketone: x  / Bili: x / Urobili: x   Blood: x / Protein: x / Nitrite: x   Leuk Esterase: x / RBC: x / WBC x   Sq Epi: x / Non Sq Epi: x / Bacteria: x    ABG - ( 14 Mar 2024 09:18 )  pH, Arterial: 7.21  pH, Blood: x     /  pCO2: 111   /  pO2: 72    / HCO3: 44    / Base Excess: 11.7  /  SaO2: 96.0

## 2024-03-14 NOTE — PROGRESS NOTE ADULT - ASSESSMENT
63 F with Hx of Hyperlipidemia, ALS on Radicava (wheelchair bound) was admitted with a fall at home without any acute injuries.    S/P Fall at home  -  likely due to progressive ALS and gait instability  -  fall precautions  -  PT as tolerated    ALS  -  appears to be progressive  -  medications reviewed by Pharmacist not on Formulary:  Radicava 105/5ml and Riluzole 50mg BID daily (patient brought in her own - sent to pharmacy)  -  speech and swallow evaluation needed   -  appreciate neuro and pulm consults  -  may need PEG for enteral nutrition  -  will likely need placement  -  medications reviewed by Pharmacist not on Formulary:  Radicava 105/5ml and Riluzole 50mg BID daily (patient brought in her own - sent to pharmacy)  -  check NIF/VC q6 hours, nocturnal pulse oximetry    Acute Hypercapneic Respiratory Failure  -  likely combination of ALS and medications resulting in decreased ventilation  -  CXR negative  -  no vomiting or concerns for aspiration at this time  -  stat ABG done this morning;  7.21/111/72/44 96%  -  start stat BiPAP 16/5 35%, discussed with respiratory therapist and will do a repeat ABG in 1 hour    Psych Disorder  -  Per pharmacy, noted on total of 450mg daily of Wellbutrin XR.  Lexapro 10mg daily.  Previously Prozac 10mg/ 20mg per pharmacy, Adderall 5mg q AM  -  holding all meds now with acute encephalopathy    Acute Metabolic Encephalopathy  -  due to hypercarbia  -  starting BIPAP, anticipate that once CO2 level comes down, that the encephalopathy will improve    Hyperlipidemia  -  on statin    DVT prophylaxis  -  venodynes    Code Status:   DNR/DNI and trial of NIV    d/w RN and patient's daughter Carrie and Dr. Arciniega   63 F with Hx of Hyperlipidemia, ALS on Radicava (wheelchair bound) was admitted with a fall at home without any acute injuries.    S/P Fall at home  -  likely due to progressive ALS and gait instability  -  fall precautions  -  PT as tolerated    ALS  -  appears to be progressive  -  medications reviewed by Pharmacist not on Formulary:  Radicava 105/5ml and Riluzole 50mg BID daily (patient brought in her own - sent to pharmacy)  -  speech and swallow evaluation needed   -  appreciate neuro and pulm consults  -  may need PEG for enteral nutrition  -  will likely need placement  -  medications reviewed by Pharmacist not on Formulary:  Radicava 105/5ml and Riluzole 50mg BID daily (patient brought in her own - sent to pharmacy)  -  check NIF/VC q6 hours, nocturnal pulse oximetry    Acute Hypercapneic Respiratory Failure  -  likely combination of ALS and medications resulting in decreased ventilation  -  CXR negative  -  no vomiting or concerns for aspiration at this time  -  stat ABG done this morning;  7.21/111/72/44 96%  -  start stat BiPAP 16/5 35%, discussed with respiratory therapist and will do a repeat ABG in 1 hour    Psych Disorder  -  Per pharmacy, noted on total of 450mg daily of Wellbutrin XR.  Lexapro 10mg daily.  Previously Prozac 10mg/ 20mg per pharmacy, Adderall 5mg q AM  -  holding all meds now with acute encephalopathy    Acute Metabolic Encephalopathy  -  due to hypercarbia  -  starting BIPAP, anticipate that once CO2 level comes down, that the encephalopathy will improve    Hyperlipidemia  -  on statin, but on hold for now    DVT prophylaxis  -  venodynes and Lovenox    Code Status:   DNR/DNI and trial of NIV    d/w RN and patient's daughter Carrie and Dr. Arciniega   63 F with Hx of Hyperlipidemia, ALS on Radicava (wheelchair bound) was admitted with a fall at home without any acute injuries.    S/p Fall at home  -  likely due to progressive ALS and gait instability  -  fall precautions  -  PT as tolerated    ALS  -  appears to be progressive  -  medications reviewed by Pharmacist not on Formulary:  Radicava 105/5ml and Riluzole 50mg BID daily (patient brought in her own - sent to pharmacy)  -  speech and swallow evaluation needed   -  appreciate neuro and pulm consults  -  may need PEG for enteral nutrition to support caloric needs  -  will likely need placement  -  check NIF/VC q6 hours, continuous pulse oximetry    Acute Hypercapneic Respiratory Failure  -  likely due to progressive ALS with restrictive lung disease and possible medications resulting in respiratory depression and CO2 retention  -  reviewed her prior BMPs which indicate a mild metabolic alkalosis since 12/2023 which supports she likely has developed chronic hypercapnea  -  CXR negative, no evidence of active clinical infection or fevers  -  no vomiting or concerns for aspiration at this time  -  stat ABG done this morning;  7.21/111/72/44 96%  -  start stat BiPAP 16/5 35%, discussed with respiratory therapist and will do a repeat ABG in 1 hour    Psych Disorder  -  Per pharmacy, noted on total of 450mg daily of Wellbutrin XR.  Lexapro 10mg daily.  Previously Prozac 10mg/ 20mg per pharmacy, Adderall 5mg q AM  -  holding all meds now with acute encephalopathy    Acute Metabolic Encephalopathy  -  due to hypercarbia  -  starting BIPAP, anticipate that once CO2 level comes down, that the encephalopathy will improve    Hyperlipidemia  -  on statin, but on hold for now    DVT prophylaxis  -  venodynes and Lovenox    Code Status:   DNR/DNI and trial of NIV    d/w RN and patient's daughter Carrie and Dr. Arciniega

## 2024-03-14 NOTE — CONSULT NOTE ADULT - SUBJECTIVE AND OBJECTIVE BOX
Patient is a 63y old  Female who presents with a chief complaint of Fall, Weakness (13 Mar 2024 16:27)      HPI:   64 y/o female with PMHx of HLD, ALS on radicava (wheelchair bound) presents to the ED Status post fall.  Patient notes increasing difficulty with transitions from the wheelchair to toilet.  Patient also endorses some shortness of breath and chest pain. Patient no longer feels that she can care for self at home.  Patient would like to be placed in a nursing facility.  Patient also complaining of ankle pain from fall.  Did not hit head did not lose consciousness.   Not seen Pulmonary in the past  Outpatient [facility]         PAST MEDICAL & SURGICAL HISTORY:  ALS (amyotrophic lateral sclerosis)      HLD (hyperlipidemia)          PREVIOUS DIAGNOSTIC TESTING:      MEDICATIONS  (STANDING):  amphetamine/dextroamphetamine 5 milliGRAM(s) Oral daily  atorvastatin 10 milliGRAM(s) Oral at bedtime  buPROPion XL (24-Hour) . 450 milliGRAM(s) Oral daily  escitalopram 10 milliGRAM(s) Oral daily  Radicava 105 mG/Day 5 milliLiter(s) Oral daily  riluzole 50 milliGRAM(s) Oral two times a day    MEDICATIONS  (PRN):  acetaminophen     Tablet .. 650 milliGRAM(s) Oral every 6 hours PRN Mild Pain (1 - 3)  melatonin 5 milliGRAM(s) Oral at bedtime PRN Insomnia  ondansetron Injectable 4 milliGRAM(s) IV Push every 4 hours PRN Nausea and/or Vomiting  zolpidem 5 milliGRAM(s) Oral at bedtime PRN Insomnia      FAMILY HISTORY:      SOCIAL HISTORY:  ***    REVIEW OF SYSTEM:  Pertinent items are noted in HPI.      Vital Signs Last 24 Hrs  T(C): 36.4 (14 Mar 2024 07:41), Max: 36.8 (13 Mar 2024 16:00)  T(F): 97.6 (14 Mar 2024 07:41), Max: 98.3 (13 Mar 2024 16:00)  HR: 98 (14 Mar 2024 07:41) (93 - 104)  BP: 135/73 (14 Mar 2024 07:41) (109/67 - 135/73)  BP(mean): 92 (13 Mar 2024 20:09) (92 - 94)  RR: 18 (14 Mar 2024 07:41) (17 - 18)  SpO2: 95% (14 Mar 2024 00:10) (95% - 98%)    Parameters below as of 14 Mar 2024 07:41  Patient On (Oxygen Delivery Method): nasal cannula  O2 Flow (L/min): 2      I&O's Summary    PHYSICAL EXAM  General Appearance: cooperative, no acute distress,   HEENT: PERRL, conjunctiva clear, EOM's intact, non injected pharynx, no exudate, TM   normal  Neck: Supple, , no adenopathy, thyroid: not enlarged, no carotid bruit or JVD  Back: Symmetric, no  tenderness,no soft tissue tenderness  Lungs: Clear to auscultation bilateral,no adventitious breath sounds, normal   expiratory phase  Heart: Regular rate and rhythm, S1, S2 normal, no murmur, rub or gallop  Abdomen: Soft, non-tender, bowel sounds active , no hepatosplenomegaly  Extremities: no cyanosis or edema, no joint swelling  Skin: Skin color, texture normal, no rashes   Neurologic: Alert but intermittently responds to questions and intermittent lethargy noted without stupor     ECG:    LABS:                          12.8   9.11  )-----------( 225      ( 14 Mar 2024 07:51 )             44.4     03-13    143  |  104  |  28<H>  ----------------------------<  119<H>  4.0   |  37<H>  |  0.35<L>    Ca    9.5      13 Mar 2024 07:59    TPro  6.8  /  Alb  3.4  /  TBili  0.3  /  DBili  x   /  AST  17  /  ALT  37  /  AlkPhos  61  03-12          Pro BNP  -- 03-12 @ 23:03  D Dimer  184 03-12 @ 23:03    PT/INR - ( 12 Mar 2024 23:03 )   PT: 10.9 sec;   INR: 0.96 ratio         PTT - ( 12 Mar 2024 23:03 )  PTT:25.1 sec  Urinalysis Basic - ( 13 Mar 2024 07:59 )    Color: x / Appearance: x / SG: x / pH: x  Gluc: 119 mg/dL / Ketone: x  / Bili: x / Urobili: x   Blood: x / Protein: x / Nitrite: x   Leuk Esterase: x / RBC: x / WBC x   Sq Epi: x / Non Sq Epi: x / Bacteria: x      ABG - ( 14 Mar 2024 09:18 )  pH, Arterial: 7.21  pH, Blood: x     /  pCO2: 111   /  pO2: 72    / HCO3: 44    / Base Excess: 11.7  /  SaO2: 96.0                  RADIOLOGY & ADDITIONAL STUDIES:

## 2024-03-14 NOTE — CONSULT NOTE ADULT - ASSESSMENT
1) Lethargy  2) ALS  3) Hypercapnia  4) Restrictive Lung Disease     64 y/o female with PMHx of HLD, ALS on radicava (wheelchair bound) presents to the ED Status post fall.  Patient notes increasing difficulty with transitions from the wheelchair to toilet.  Patient also endorses some shortness of breath and chest pain. Patient no longer feels that she can care for self at home.  Patient would like to be placed in a nursing facility.  Patient also complaining of ankle pain from fall.  Did not hit head did not lose consciousness.   Not seen Pulmonary in the past  Outpatient [facility]   Given the fact that she has lethargy, will need an ABG  Addendum_ ABG reviewed 7.21/111/72  She has ALS related restrictive lung disease causing acute hypercapnia leading to mentation changes  Unclear if this is bulbar/non bulbar   Start BiPAP  Follow up q 6 hrs NIF/VC , nocturnal oximetry monitoring  If clinical status worsens, will need MICU  Discussed ABG/NIPPV Plan with hospitalist   If VC falls  below 15 to 20 mL/kg, will need to consider elective intubation   Thank you for the consult, will continue to monitor  1) Lethargy  2) ALS  3) Hypercapnia  4) Restrictive Lung Disease     64 y/o female with PMHx of HLD, ALS on radicava (wheelchair bound) presents to the ED Status post fall.  Patient notes increasing difficulty with transitions from the wheelchair to toilet.  Patient also endorses some shortness of breath and chest pain. Patient no longer feels that she can care for self at home.  Patient would like to be placed in a nursing facility.  Patient also complaining of ankle pain from fall.  Did not hit head did not lose consciousness.   Not seen Pulmonary in the past  Outpatient [facility]   Given the fact that she has lethargy, will need an ABG  Addendum_ ABG reviewed 7.21/111/72  She has ALS related restrictive lung disease causing acute hypercapnia leading to mentation changes  Unclear if this is bulbar/non bulbar   Start BiPAP  Follow up q 6 hrs NIF/VC , nocturnal oximetry monitoring  If clinical status worsens, will need MICU  Discussed ABG/NIPPV Plan with hospitalist   VC goal is above  15 to 20 mL/kg- patient is DNR/DNI  Thank you for the consult, will continue to monitor

## 2024-03-15 NOTE — PROVIDER CONTACT NOTE (CRITICAL VALUE NOTIFICATION) - ACTION/TREATMENT ORDERED:
no new orders at this time; will repeat ABG later
Continue to monitor
place on BiPap and repeat ABG; possible transfer to higher level of care if necessary

## 2024-03-15 NOTE — SWALLOW BEDSIDE ASSESSMENT ADULT - SWALLOW EVAL: PROGNOSIS
2) The pt's UE dexterity when feeding was somewhat reduced but felt to be functional. Additionally, the 2) The pt's UE dexterity when feeding was somewhat reduced but felt to be functional. This is atop Oropharyngeal Dysphagia of relative mild severity which subjectively appeared to be functional condition. Bolus formation/transfer were mildly prolonged/discontinuous but mechanically functional without an overt oral motor focality. Swallow was triggered in an acceptable time frame for age & laryngeal lift on palpation during swallowing trials was mildly reduced but felt to be functional as well. While mild Dysphagia may place pt at a relatively heightened risk for episodic aspiration, she did not demonstrate any behavioral aspiration signs on exam. No change in O2 sats noted. Odynophagia denied. Functional Dysphagia ic chronic/pre-existing/irreversible in setting of ALS dx in 2021. Pt with a remarkable amount of bulbar preservation for a pt with ALS dx 3 years ago. Oropharyngeal swallow integrity is felt to be maximized.

## 2024-03-15 NOTE — SWALLOW BEDSIDE ASSESSMENT ADULT - COMMENTS
The pt was admitted to  status post fall preceded by progressive weakness. Hospital course is remarkable for hypercapnia/respiratory failure for which she is on Bipap. She has an underlying history of ALS diagnosed in 2021 as well as hyperlipidemia.    I ATTEMPTED TO SEE PATIENT FOR SWALLOW CONSULT ON 3 OCCASIONS TODAY. PT IS CURRENTLY POORLY RESPONSIVE AND IN A HYPERCAPNIC STATE WARRANTING BIPAP. NOT CANDIDATE FOR SWALLOW TESTING AT THIS TIME. D/W DR CHOWDARY, WILL DEFER SWALLOW TESTING FOR TODAY AND WILL REATTEMPT TOMORROW PENDING IMPROVEMENT IN RESPONSIVENESS/PULMONARY FUNCTION.

## 2024-03-15 NOTE — SWALLOW BEDSIDE ASSESSMENT ADULT - SLP GENERAL OBSERVATIONS
On encounter, an O2 cannula was in place. The pt was alert, interactive & cooperative. Her receptive language abilities were preserved and she was able to verbalize during communicative probes. At these times, pt's motor speech integrity was functional from an articulation standpoint and speech intelligibility is 100%. Moreover, the pt's verbalizations were linguistically intact and contextually appropriate. However, her vocal intensity is reduced due to decreased breath support phonation which is chronic due irreversible pulmonary dysfunction associated with ALS(dx in 2021). Pt is able to functionally verbalize her needs nonetheless and speech-language integrity is reportedly at pre-hospitalization state. The pt demonstrates a remarkable amount of bulbar preservation given her diagnosis with ALS 3 years ago.

## 2024-03-15 NOTE — SWALLOW BEDSIDE ASSESSMENT ADULT - SWALLOW EVAL: CRITERIA FOR SKILLED INTERVENTION MET
DO NOT FEEL THAT ACUTE SPEECH PATHOLOGY FOLLOW UP WOULD CHANGE CLINICAL MANAGEMENT/OUTCOME IN HOSPITAL SETTING. PT'S FUNCTIONAL HYPOPHONIA/FUNCTIONAL DYSPHAGIA ARE CHRONIC/PRE-EXISTING/IRREVERSIBLE IN SETTING OF ALS. DIRECT SPEECH PATHOLOGY EXERCISES WOULD ACTUALLY CAUSE OROPHARYNGEAL FATIGUE, NOT RESTORATION. PT'S COMMUNICATIVE/OROPHARYNGEAL SWALLOWING INTEGRITY ARE FELT TO BE MAXIMIZED/AT PRE-HOSPITALIZATION STATE. GIVEN ABOVE, THIS SERVICE WILL NOT ACTIVELY FOLLOW. RECONSULT PRN SHOULD STATUS CHANGE AND CONDITION WARRANT.

## 2024-03-15 NOTE — SWALLOW BEDSIDE ASSESSMENT ADULT - SWALLOW EVAL: DIAGNOSIS
1) On encounter, an O2 cannula was in place. The pt was alert, interactive & cooperative. Her receptive language abilities were preserved and she was able to verbalize during communicative probes. At these times, pt's motor speech was functional from an articulation standpoint and speech intelligibility is 100%. Moreover, the pt's verbalizations were linguistically intact and contextually appropriate. However, her vocal intensity is reduced due to decreased breath support phonation which is chronic due irreversible pulmonary dysfunction associated with ALS(dx in 2021). Pt is able to functionally verbalize her needs nonetheless and speech-language integrity is reportedly at pre-hospitalization state. The pt with a remarkable amount of bulbar preservation for a pt diagnosed with ALS 3 years ago. 1) On encounter, an O2 cannula was in place. The pt was alert, interactive & cooperative. Her receptive language abilities were preserved and she was able to verbalize during communicative probes. At these times, pt's motor speech integrity was functional from an articulation standpoint and speech intelligibility is 100%. Moreover, the pt's verbalizations were linguistically intact and contextually appropriate. However, her vocal intensity is reduced due to decreased breath support phonation which is chronic due irreversible pulmonary dysfunction associated with ALS(dx in 2021). Pt is able to functionally verbalize her needs nonetheless and speech-language integrity is reportedly at pre-hospitalization state. The pt demonstrates a remarkable amount of bulbar preservation given her diagnosis with ALS 3 years ago.

## 2024-03-15 NOTE — SWALLOW BEDSIDE ASSESSMENT ADULT - SWALLOW EVAL: RECOMMENDED DIET
SUGGEST A REGULAR CONSISTENCY DIET WITH THIN LIQUID TEXTURES AS THE PATIENT APPEARED CLINICALLY TOLERANT OF THESE FOOD TEXTURES FROM AN OROPHARYNGEAL SWALLOWING PERSPECTIVE ON EXAM, DESPITE FUNCTIONAL DYSPHAGIA. FURTHER, FOOD CONSISTENCIES ON THIS DIET ACCOMMODATES HER EXPRESSED FOOD PREFERENCES.

## 2024-03-15 NOTE — SWALLOW BEDSIDE ASSESSMENT ADULT - NS SPL SWALLOW CLINIC TRIAL FT
The pt's UE dexterity when feeding was somewhat reduced but felt to be functional. This is atop Oropharyngeal Dysphagia of relative mild severity which subjectively appeared to be functional condition. Labial grading on utensils was functional. Bolus formation/transfer were mildly prolonged/discontinuous but mechanically functional without an overt oral motor focality. Swallow was triggered in an acceptable time frame for age & laryngeal lift on palpation during swallowing trials was mildly reduced but felt to be functional as well. While mild Dysphagia may place pt at a relatively heightened risk for episodic aspiration, she did not demonstrate any behavioral aspiration signs on exam. No change in O2 sats noted. Odynophagia denied. Functional Dysphagia ic chronic/pre-existing/irreversible in setting of ALS dx in 2021. Pt with a remarkable amount of bulbar preservation for a pt with ALS dx 3 years ago. Oropharyngeal swallow integrity is felt to be maximized.

## 2024-03-15 NOTE — SWALLOW BEDSIDE ASSESSMENT ADULT - COMMENTS
The pt was admitted to  status post fall preceded by progressive weakness. Hospital course is remarkable for hypercapnia/respiratory failure for which she is on Bipap. She has an underlying history of ALS diagnosed in 2021 as well as hyperlipidemia.    I ATTEMPTED TO SEE PATIENT FOR SWALLOW CONSULT ON 3 OCCASIONS TODAY. PT IS CURRENTLY POORLY RESPONSIVE AND IN A HYPERCAPNIC STATE WARRANTING BIPAP. NOT CANDIDATE FOR SWALLOW TESTING AT THIS TIME. D/W DR CHOWDARY, WILL DEFER SWALLOW TESTING FOR TODAY AND WILL REATTEMPT TOMORROW PENDING IMPROVEMENT IN RESPONSIVENESS/PULMONARY FUNCTION. The pt was admitted to  status post fall preceded by progressive weakness. Hospital course is remarkable for hypercapnia/respiratory failure for which she is on Bipap. She has an underlying history of ALS diagnosed in 2021, depression, as well as hyperlipidemia.    I ATTEMPTED TO SEE PATIENT FOR SWALLOW CONSULT ON 3 OCCASIONS TODAY. PT IS CURRENTLY POORLY RESPONSIVE AND IN A HYPERCAPNIC STATE WARRANTING BIPAP. NOT CANDIDATE FOR SWALLOW TESTING AT THIS TIME. D/W DR CHOWDARY, WILL DEFER SWALLOW TESTING FOR TODAY AND WILL REATTEMPT TOMORROW PENDING IMPROVEMENT IN RESPONSIVENESS/PULMONARY FUNCTION.

## 2024-03-15 NOTE — PROGRESS NOTE ADULT - SUBJECTIVE AND OBJECTIVE BOX
Chart and meds reviewed.  Patient seen and examined.    3/15 - stayed on BiPAP overnight, she is more awake and alert this morning, able to carry a conversation without falling asleep, she reports an episode of nausea and vomiting this morning, no abdominal pain, no fevers, reports that she gets nausea and vomiting intermittently at home, repeat ABG done this morning reviewed and discussed with Dr. Arciniega.      All other systems reviewed and found to be negative with the exception of what has been described above.    MEDICATIONS  (STANDING):  amphetamine/dextroamphetamine 5 milliGRAM(s) Oral daily  atorvastatin 10 milliGRAM(s) Oral at bedtime  buPROPion XL (24-Hour) . 450 milliGRAM(s) Oral daily  dextrose 5% + sodium chloride 0.45%. 1000 milliLiter(s) (75 mL/Hr) IV Continuous <Continuous>  enoxaparin Injectable 40 milliGRAM(s) SubCutaneous every 24 hours  escitalopram 10 milliGRAM(s) Oral daily  Radicava 105 mG/Day 5 milliLiter(s) Oral daily  riluzole 50 milliGRAM(s) Oral two times a day    MEDICATIONS  (PRN):  acetaminophen     Tablet .. 650 milliGRAM(s) Oral every 6 hours PRN Mild Pain (1 - 3)  ibuprofen  Tablet. 600 milliGRAM(s) Oral every 6 hours PRN Moderate Pain (4 - 6)  melatonin 5 milliGRAM(s) Oral at bedtime PRN Insomnia  ondansetron Injectable 4 milliGRAM(s) IV Push every 4 hours PRN Nausea and/or Vomiting  zolpidem 5 milliGRAM(s) Oral at bedtime PRN Insomnia    VITAL SIGNS:  T(F): 98.1 (03-15-24 @ 07:35), Max: 98.5 (03-15-24 @ 00:00)  HR: 82 (03-15-24 @ 07:35) (78 - 89)  BP: 116/60 (03-15-24 @ 07:35) (115/65 - 126/79)  RR: 18 (03-15-24 @ 07:35) (18 - 18)  SpO2: 99% (03-15-24 @ 07:35) (95% - 100%)    PHYSICAL EXAM:  HEENT:  pupils equal and reactive, EOMI, no oropharyngeal lesions, erythema, exudates, oral thrush  NECK:   supple, no carotid bruits, no palpable lymph nodes, no thyromegaly  CV:  +S1, +S2, regular, no murmurs  RESP:   lungs clear to auscultation bilaterally, no wheezing, rales, rhonchi, good air entry bilaterally  GI:  abdomen soft, non-tender, non-distended, normal BS, no bruits  EXT:  no clubbing, no cyanosis, no edema, no calf pain, swelling or erythema  NEURO:  AAOX3, no focal neurological deficits, follows all commands, able to move extremities spontaneously  SKIN:  no ulcers, lesions or rashes    LABS:                        11.4   6.22  )-----------( 176      ( 15 Mar 2024 07:41 )             39.5     03-15    141  |  100  |  19  ----------------------------<  125<H>  4.1   |  41<H>  |  0.33<L>    Ca    9.3      15 Mar 2024 07:41    ABG - ( 15 Mar 2024 07:40 )  pH, Arterial: 7.34  pH, Blood: x     /  pCO2: 81    /  pO2: 105   / HCO3: 44    / Base Excess: 14.0  /  SaO2: 99         Chart and meds reviewed.  Patient seen and examined.    3/15 - stayed on BiPAP overnight, she is more awake and alert this morning, able to carry a conversation without falling asleep, she reports an episode of nausea and vomiting this morning, no abdominal pain, no fevers, reports that she gets nausea and vomiting intermittently at home, repeat ABG done this morning reviewed and discussed with Dr. Arciniega.  Feels weak and tired    All other systems reviewed and found to be negative with the exception of what has been described above.    MEDICATIONS  (STANDING):  amphetamine/dextroamphetamine 5 milliGRAM(s) Oral daily  atorvastatin 10 milliGRAM(s) Oral at bedtime  buPROPion XL (24-Hour) . 450 milliGRAM(s) Oral daily  dextrose 5% + sodium chloride 0.45%. 1000 milliLiter(s) (75 mL/Hr) IV Continuous <Continuous>  enoxaparin Injectable 40 milliGRAM(s) SubCutaneous every 24 hours  escitalopram 10 milliGRAM(s) Oral daily  Radicava 105 mG/Day 5 milliLiter(s) Oral daily  riluzole 50 milliGRAM(s) Oral two times a day    MEDICATIONS  (PRN):  acetaminophen     Tablet .. 650 milliGRAM(s) Oral every 6 hours PRN Mild Pain (1 - 3)  ibuprofen  Tablet. 600 milliGRAM(s) Oral every 6 hours PRN Moderate Pain (4 - 6)  melatonin 5 milliGRAM(s) Oral at bedtime PRN Insomnia  ondansetron Injectable 4 milliGRAM(s) IV Push every 4 hours PRN Nausea and/or Vomiting  zolpidem 5 milliGRAM(s) Oral at bedtime PRN Insomnia    VITAL SIGNS:  T(F): 98.1 (03-15-24 @ 07:35), Max: 98.5 (03-15-24 @ 00:00)  HR: 82 (03-15-24 @ 07:35) (78 - 89)  BP: 116/60 (03-15-24 @ 07:35) (115/65 - 126/79)  RR: 18 (03-15-24 @ 07:35) (18 - 18)  SpO2: 99% (03-15-24 @ 07:35) (95% - 100%)    PHYSICAL EXAM:  HEENT:  pupils equal and reactive, EOMI, no oropharyngeal lesions, erythema, exudates, oral thrush  NECK:   supple, no carotid bruits, no palpable lymph nodes, no thyromegaly  CV:  +S1, +S2, regular, no murmurs  RESP:   lungs clear to auscultation bilaterally, no wheezing, rales, rhonchi, good air entry bilaterally  GI:  abdomen soft, non-tender, non-distended, normal BS, no bruits  EXT:  no clubbing, no cyanosis, no edema, no calf pain, swelling or erythema  NEURO:  AAOX3, no focal neurological deficits, follows all commands, able to move extremities spontaneously  SKIN:  no ulcers, lesions or rashes    LABS:                        11.4   6.22  )-----------( 176      ( 15 Mar 2024 07:41 )             39.5     03-15    141  |  100  |  19  ----------------------------<  125<H>  4.1   |  41<H>  |  0.33<L>    Ca    9.3      15 Mar 2024 07:41    ABG - ( 15 Mar 2024 07:40 )  pH, Arterial: 7.34  pH, Blood: x     /  pCO2: 81    /  pO2: 105   / HCO3: 44    / Base Excess: 14.0  /  SaO2: 99         Chart and meds reviewed.  Patient seen and examined.    3/15 - stayed on BiPAP overnight, she is more awake and alert this morning, able to carry a conversation without falling asleep, she reports an episode of nausea and vomiting this morning, no abdominal pain, no fevers, reports that she gets nausea and vomiting intermittently at home, repeat ABG done this morning reviewed and discussed with Dr. Arciniega.  Feels weak and tired    All other systems reviewed and found to be negative with the exception of what has been described above.    MEDICATIONS  (STANDING):  amphetamine/dextroamphetamine 5 milliGRAM(s) Oral daily  atorvastatin 10 milliGRAM(s) Oral at bedtime  buPROPion XL (24-Hour) . 450 milliGRAM(s) Oral daily  dextrose 5% + sodium chloride 0.45%. 1000 milliLiter(s) (75 mL/Hr) IV Continuous <Continuous>  enoxaparin Injectable 40 milliGRAM(s) SubCutaneous every 24 hours  escitalopram 10 milliGRAM(s) Oral daily  Radicava 105 mG/Day 5 milliLiter(s) Oral daily  riluzole 50 milliGRAM(s) Oral two times a day    MEDICATIONS  (PRN):  acetaminophen     Tablet .. 650 milliGRAM(s) Oral every 6 hours PRN Mild Pain (1 - 3)  ibuprofen  Tablet. 600 milliGRAM(s) Oral every 6 hours PRN Moderate Pain (4 - 6)  melatonin 5 milliGRAM(s) Oral at bedtime PRN Insomnia  ondansetron Injectable 4 milliGRAM(s) IV Push every 4 hours PRN Nausea and/or Vomiting  zolpidem 5 milliGRAM(s) Oral at bedtime PRN Insomnia    VITAL SIGNS:  T(F): 98.1 (03-15-24 @ 07:35), Max: 98.5 (03-15-24 @ 00:00)  HR: 82 (03-15-24 @ 07:35) (78 - 89)  BP: 116/60 (03-15-24 @ 07:35) (115/65 - 126/79)  RR: 18 (03-15-24 @ 07:35) (18 - 18)  SpO2: 99% (03-15-24 @ 07:35) (95% - 100%)    PHYSICAL EXAM:  HEENT:  pupils equal and reactive, EOMI, no oropharyngeal lesions, erythema, exudates, oral thrush  NECK:   supple, no carotid bruits, no palpable lymph nodes, no thyromegaly  CV:  +S1, +S2, regular, no murmurs  RESP:   lungs clear to auscultation bilaterally, no wheezing, rales, rhonchi, good air entry bilaterally  GI:  abdomen soft, non-tender, non-distended, normal BS, no bruits  EXT:  no clubbing, no cyanosis, no edema, no calf pain, swelling or erythema  NEURO:  AAOX3, legs are weak, can move her right leg, but not the left (chronic), able to move upper extremities, follows simple commands  SKIN:  no ulcers, lesions or rashes    LABS:                        11.4   6.22  )-----------( 176      ( 15 Mar 2024 07:41 )             39.5     03-15    141  |  100  |  19  ----------------------------<  125<H>  4.1   |  41<H>  |  0.33<L>    Ca    9.3      15 Mar 2024 07:41    ABG - ( 15 Mar 2024 07:40 )  pH, Arterial: 7.34  pH, Blood: x     /  pCO2: 81    /  pO2: 105   / HCO3: 44    / Base Excess: 14.0  /  SaO2: 99

## 2024-03-15 NOTE — SWALLOW BEDSIDE ASSESSMENT ADULT - ADDITIONAL RECOMMENDATIONS
HOSPITALIST AND NUTRITION F/U. PT WITH CHRONIC DYSPHAGIA WHICH IS PRESENTLY A FUNCTIONAL CONDITION. THE PT IS COGNITIVELY INTACT AND CLEARLY STATED THAT UNDER NO CIRCUMSTANCE WOULD SHE ACCEPT ANY TYPE OF DIET TEXTURE MODIFICATION NOR WOULD SHE EVER CONSIDER FEEDING TUBE PLACEMENT ,  E EC HOSPITALIST AND NUTRITION F/U. PT WITH CHRONIC DYSPHAGIA WHICH IS PRESENTLY A FUNCTIONAL CONDITION. THE PT IS COGNITIVELY INTACT AND CLEARLY STATED THAT UNDER NO CIRCUMSTANCE WOULD SHE ACCEPT ANY TYPE OF DIET TEXTURE MODIFICATION NOR WOULD SHE EVER CONSIDER FEEDING TUBE PLACEMENT. NOTE THAT I ADVISED PT THAT WHILE SHE DOES TOLERATE REGULAR TEXTURE FOODS/LIQUIDS FROM AN OROPHARYNGEAL SWALLOWING PERSPECTIVE, FOODS ON AN EASY TO CHEW DIET WOULD PLACE LESS PULMONARY DEMANDS ON HER. PT RESPONDED BY STATING THAT SHE PREFERS TO BE ON A REGULAR CONSISTENCY DIET. PT ALSO STATED THAT  '' I DO NOT WANT ANYTHING DONE TO ME MEDICALLY IF IT MAKES MY QUALITY OF LIFE WORSE". I THOROUGHLY REVIEWED ASPIRATION PRECAUTION PRACTICES WITH PATIENT WHO VERBALIZED AN UNDERSTANDING OF THE ABOVE INFORMATION.

## 2024-03-15 NOTE — SWALLOW BEDSIDE ASSESSMENT ADULT - COMMENTS
The pt was admitted to  status post fall preceded by progressive weakness. Hospital course is remarkable for hypercapnia/respiratory failure for which she was on Bipap and subsequently placed on O2 cannula when pulmonary status improved. She has an underlying history of ALS diagnosed in 2021, depression as well as hyperlipidemia.

## 2024-03-15 NOTE — PROGRESS NOTE ADULT - ASSESSMENT
1) Lethargy  2) ALS  3) Hypercapnia  4) Restrictive Lung Disease     62 y/o female with PMHx of HLD, ALS on radicava (wheelchair bound) presents to the ED Status post fall.  Patient notes increasing difficulty with transitions from the wheelchair to toilet.  Patient also endorses some shortness of breath and chest pain. Patient no longer feels that she can care for self at home.  Patient would like to be placed in a nursing facility.  Patient also complaining of ankle pain from fall.  Did not hit head did not lose consciousness.   Not seen Pulmonary in the past  ABG reviewed 7.21/111/72--> 7.34/81/105  She has ALS related restrictive lung disease causing acute hypercapnia leading to mentation changes now improved significantly after BiPAP  Unclear if this is bulbar/non bulbar  Q 6 hrs NIF/VC , nocturnal oximetry monitoring  VC goal is above  15 to 20 mL/kg- she is at 1300 now which is appropriate patient is DNR/DNI  Patient is currently hospitalized with a diagnosis of Chronic Hypercapnic Respiratory Failure with hypoxia secondary to neuromuscular disease/ALS.  Patient demonstrates shortness of breath and the use of accessory muscles at rest.  While on BIPAP/AVAPS with the settings of 18/8 back up rate 12 patient still showed higher than normal CO2 levels of 81 and for that reason, I feel patient would benefit from NIV augmented volume ventilation with guaranteed targeted tidal volume not found on a standard PAP for home use, to better control the patient's disease process.  Without NIV, the patient’s condition will continue to deteriorate, which will lead to further exacerbation and patient harm.  Thank you for the consult, will continue to monitor

## 2024-03-15 NOTE — PROGRESS NOTE ADULT - ASSESSMENT
63 F with Hx of Hyperlipidemia, ALS on Radicava (wheelchair bound) was admitted with a fall at home without any acute injuries.    S/p Fall at home  -  likely due to progressive ALS and gait instability  -  fall precautions  -  PT as tolerated    ALS  -  appears to be progressive  -  medications reviewed by Pharmacist not on Formulary:  Radicava 105/5ml and Riluzole 50mg BID daily (patient brought in her own - sent to pharmacy)  -  s/p speech and swallow today - cleared for regular diet with aspiration precautions  -  appreciate neuro and pulm consults  -  reviewed NIF -25 and VC 1300, continue to check q6 hours  -  continue pulse oximetry    Acute on Chronic Hypercapneic Respiratory Failure  -  likely due to progressive ALS with restrictive lung disease and possible medications resulting in respiratory depression and CO2 retention  -  reviewed her prior BMPs which indicate a mild metabolic alkalosis since 12/2023 which supports she likely has developed chronic hypercapnea  -  CXR negative, no evidence of active clinical infection or fevers  -  will need to stay on BiPAP qHS, current settings are 15/5 35% with backup rate of 12  -  reviewed ABG from this morning - pH 7.34, PCO2 81, PO2 104, HCO3 44  -  will keep off BIPAP during the day as long as she remains awake and alert, if becomes lethargic will resume BiPAP during the day  -  despite the PCO2 of 81 today, she is awake and alert now and able to carry conversations without falling asleep    Psych Disorder  -  Per pharmacy, noted on total of 450mg daily of Wellbutrin XR.  Lexapro 10mg daily.  Previously Prozac 10mg/ 20mg per pharmacy, Adderall 5mg q AM    Acute Metabolic Encephalopathy  -  due to hypercarbia  -  resolved    Hyperlipidemia  -  on statin, but on hold for now    DVT prophylaxis  -  venodynes and Lovenox    Code Status:   DNR/DNI and trial of NIV    Dispo:  will need rehab, likely next week    d/w RN, Dr. Arciniega, and case management       63 F with Hx of Hyperlipidemia, ALS on Radicava (wheelchair bound) was admitted with a fall at home without any acute injuries.    S/p Fall at home  -  likely due to progressive ALS and gait instability  -  fall precautions  -  PT as tolerated    ALS  -  appears to be progressive, she is wheelchair bound at baseline and transfer to chair, does not walk  -  medications reviewed by Pharmacist not on Formulary:  Radicava 105/5ml and Riluzole 50mg BID daily (patient brought in her own - sent to pharmacy)  -  s/p speech and swallow today - cleared for regular diet with aspiration precautions  -  appreciate neuro and pulm consults  -  reviewed NIF -25 and VC 1300, continue to check q6 hours  -  continue pulse oximetry    Acute on Chronic Hypercapneic Respiratory Failure  -  likely due to progressive ALS with restrictive lung disease and possible medications resulting in respiratory depression and CO2 retention  -  reviewed her prior BMPs which indicate a mild metabolic alkalosis since 12/2023 which supports she likely has developed chronic hypercapnea  -  CXR negative, no evidence of active clinical infection or fevers  -  will need to stay on BiPAP qHS, current settings are 15/5 35% with backup rate of 12  -  reviewed ABG from this morning - pH 7.34, PCO2 81, PO2 104, HCO3 44  -  will keep off BIPAP during the day as long as she remains awake and alert, if becomes lethargic will resume BiPAP during the day  -  despite the PCO2 of 81 today, she is awake and alert now and able to carry conversations without falling asleep    Psych Disorder  -  Per pharmacy, noted on total of 450mg daily of Wellbutrin XR.  Lexapro 10mg daily.  Previously Prozac 10mg/ 20mg per pharmacy, Adderall 5mg q AM    Acute Metabolic Encephalopathy  -  due to hypercarbia  -  resolved    Hyperlipidemia  -  on statin, but on hold for now    DVT prophylaxis  -  venodynes and Lovenox    Code Status:   DNR/DNI and trial of NIV    Dispo:  will need rehab, likely next week    d/w RN, Dr. Arciniega, and case management

## 2024-03-15 NOTE — SWALLOW BEDSIDE ASSESSMENT ADULT - ASPIRATION PRECAUTIONS
Maintain aspiration precautions as a conservative measure. While chronic mild functional Dysphagia may place pt at a relatively heightened risk for episodic aspiration, she did not demonstrate any behavioral aspiration signs on exam./yes

## 2024-03-15 NOTE — PROGRESS NOTE ADULT - SUBJECTIVE AND OBJECTIVE BOX
Patient is a 63y old  Female who presents with a chief complaint of Fall, Weakness (13 Mar 2024 16:27)      HPI:   62 y/o female with PMHx of HLD, ALS on radicava (wheelchair bound) presents to the ED Status post fall.  Patient notes increasing difficulty with transitions from the wheelchair to toilet.  Patient also endorses some shortness of breath and chest pain. Patient no longer feels that she can care for self at home.  Patient would like to be placed in a nursing facility.  Patient also complaining of ankle pain from fall.  Did not hit head did not lose consciousness.   Not seen Pulmonary in the past  Outpatient [facility]         PAST MEDICAL & SURGICAL HISTORY:  ALS (amyotrophic lateral sclerosis)      HLD (hyperlipidemia)          PREVIOUS DIAGNOSTIC TESTING:      MEDICATIONS  (STANDING):  amphetamine/dextroamphetamine 5 milliGRAM(s) Oral daily  atorvastatin 10 milliGRAM(s) Oral at bedtime  buPROPion XL (24-Hour) . 450 milliGRAM(s) Oral daily  escitalopram 10 milliGRAM(s) Oral daily  Radicava 105 mG/Day 5 milliLiter(s) Oral daily  riluzole 50 milliGRAM(s) Oral two times a day    MEDICATIONS  (PRN):  acetaminophen     Tablet .. 650 milliGRAM(s) Oral every 6 hours PRN Mild Pain (1 - 3)  melatonin 5 milliGRAM(s) Oral at bedtime PRN Insomnia  ondansetron Injectable 4 milliGRAM(s) IV Push every 4 hours PRN Nausea and/or Vomiting  zolpidem 5 milliGRAM(s) Oral at bedtime PRN Insomnia      FAMILY HISTORY:      SOCIAL HISTORY:  ***    REVIEW OF SYSTEM:  Pertinent items are noted in HPI.      Vital Signs Last 24 Hrs  T(C): 36.7 (15 Mar 2024 07:35), Max: 36.9 (15 Mar 2024 00:00)  T(F): 98.1 (15 Mar 2024 07:35), Max: 98.5 (15 Mar 2024 00:00)  HR: 82 (15 Mar 2024 07:35) (78 - 89)  BP: 116/60 (15 Mar 2024 07:35) (115/65 - 126/79)  BP(mean): --  RR: 18 (15 Mar 2024 07:35) (18 - 18)  SpO2: 99% (15 Mar 2024 07:35) (95% - 100%)    Parameters below as of 15 Mar 2024 07:35  Patient On (Oxygen Delivery Method): nasal cannula  O2 Flow (L/min): 2      I&O's Summary    PHYSICAL EXAM  General Appearance: cooperative, no acute distress,   HEENT: PERRL, conjunctiva clear, EOM's intact, non injected pharynx, no exudate, TM   normal  Neck: Supple, , no adenopathy, thyroid: not enlarged, no carotid bruit or JVD  Back: Symmetric, no  tenderness,no soft tissue tenderness  Lungs: Clear to auscultation bilateral,no adventitious breath sounds, normal   expiratory phase  Heart: Regular rate and rhythm, S1, S2 normal, no murmur, rub or gallop  Abdomen: Soft, non-tender, bowel sounds active , no hepatosplenomegaly  Extremities: no cyanosis or edema, no joint swelling  Skin: Skin color, texture normal, no rashes   Neurologic: Alert and Oriented     ECG:    LABS:                          12.8   9.11  )-----------( 225      ( 14 Mar 2024 07:51 )             44.4     03-13    143  |  104  |  28<H>  ----------------------------<  119<H>  4.0   |  37<H>  |  0.35<L>    Ca    9.5      13 Mar 2024 07:59    TPro  6.8  /  Alb  3.4  /  TBili  0.3  /  DBili  x   /  AST  17  /  ALT  37  /  AlkPhos  61  03-12          Pro BNP  -- 03-12 @ 23:03  D Dimer  184 03-12 @ 23:03    PT/INR - ( 12 Mar 2024 23:03 )   PT: 10.9 sec;   INR: 0.96 ratio         PTT - ( 12 Mar 2024 23:03 )  PTT:25.1 sec  Urinalysis Basic - ( 13 Mar 2024 07:59 )    Color: x / Appearance: x / SG: x / pH: x  Gluc: 119 mg/dL / Ketone: x  / Bili: x / Urobili: x   Blood: x / Protein: x / Nitrite: x   Leuk Esterase: x / RBC: x / WBC x   Sq Epi: x / Non Sq Epi: x / Bacteria: x      ABG - ( 14 Mar 2024 09:18 )  pH, Arterial: 7.21  pH, Blood: x     /  pCO2: 111   /  pO2: 72    / HCO3: 44    / Base Excess: 11.7  /  SaO2: 96.0                  RADIOLOGY & ADDITIONAL STUDIES:

## 2024-03-15 NOTE — PROVIDER CONTACT NOTE (CRITICAL VALUE NOTIFICATION) - NS PROVIDER READ BACK
yes
Peng Advancement Flap Text: The defect edges were debeveled with a #15 scalpel blade.  Given the location of the defect, shape of the defect and the proximity to free margins a Peng advancement flap was deemed most appropriate.  Using a sterile surgical marker, an appropriate advancement flap was drawn incorporating the defect and placing the expected incisions within the relaxed skin tension lines where possible. The area thus outlined was incised deep to adipose tissue with a #15 scalpel blade.  The skin margins were undermined to an appropriate distance in all directions utilizing iris scissors.

## 2024-03-16 NOTE — PROGRESS NOTE ADULT - SUBJECTIVE AND OBJECTIVE BOX
Chart and meds reviewed.  Patient seen and examined.    3/16 - no events overnight, she reports + nausea, no vomiting, had an episode of shortness of breath this morning but now says that it resolved, occasional headache, no chest pain.  She remains on 1L O2, on room air sats drop to the high 80s.    All other systems reviewed and found to be negative with the exception of what has been described above.    MEDICATIONS  (STANDING):  amphetamine/dextroamphetamine 5 milliGRAM(s) Oral daily  atorvastatin 10 milliGRAM(s) Oral at bedtime  buPROPion XL (24-Hour) . 450 milliGRAM(s) Oral daily  enoxaparin Injectable 40 milliGRAM(s) SubCutaneous every 24 hours  escitalopram 10 milliGRAM(s) Oral daily  Radicava 105 mG/Day 5 milliLiter(s) Oral daily  riluzole 50 milliGRAM(s) Oral two times a day    MEDICATIONS  (PRN):  acetaminophen     Tablet .. 650 milliGRAM(s) Oral every 6 hours PRN Mild Pain (1 - 3)  ibuprofen  Tablet. 600 milliGRAM(s) Oral every 6 hours PRN Moderate Pain (4 - 6)  melatonin 5 milliGRAM(s) Oral at bedtime PRN Insomnia  ondansetron Injectable 4 milliGRAM(s) IV Push every 4 hours PRN Nausea and/or Vomiting  zolpidem 5 milliGRAM(s) Oral at bedtime PRN Insomnia    VITAL SIGNS:  T(F): 98.2 (03-16-24 @ 07:30), Max: 98.7 (03-15-24 @ 16:00)  HR: 80 (03-16-24 @ 07:30) (80 - 102)  BP: 121/86 (03-16-24 @ 07:30) (113/83 - 127/74)  RR: 18 (03-16-24 @ 07:30) (17 - 18)  SpO2: 93% (03-16-24 @ 07:30) (93% - 98%)    PHYSICAL EXAM:  HEENT:  pupils equal and reactive, EOMI, no oropharyngeal lesions, erythema, exudates, oral thrush  NECK:   supple, no carotid bruits, no palpable lymph nodes, no thyromegaly  CV:  +S1, +S2, regular, no murmurs  RESP:   lungs clear to auscultation bilaterally, no wheezing, rales, rhonchi, good air entry bilaterally  GI:  abdomen soft, non-tender, non-distended, normal BS  EXT:  no clubbing, no cyanosis, no edema, no calf pain, swelling or erythema  NEURO:  Awake, alert, follows simple commands, chronic lower extremity weakness, L>>R  SKIN:  no ulcers, lesions or rashes    LABS:                        11.4   6.22  )-----------( 176      ( 15 Mar 2024 07:41 )             39.5     03-15    141  |  100  |  19  ----------------------------<  125<H>  4.1   |  41<H>  |  0.33<L>    Ca    9.3      15 Mar 2024 07:41

## 2024-03-16 NOTE — PROGRESS NOTE ADULT - ASSESSMENT
1) Lethargy  2) ALS  3) Hypercapnia  4) Restrictive Lung Disease     64 y/o female with PMHx of HLD, ALS on radicava (wheelchair bound) presents to the ED Status post fall.  Patient notes increasing difficulty with transitions from the wheelchair to toilet.  Patient also endorses some shortness of breath and chest pain. Patient no longer feels that she can care for self at home.  Patient would like to be placed in a nursing facility.  Patient also complaining of ankle pain from fall.  Did not hit head did not lose consciousness.   Not seen Pulmonary in the past  ABG reviewed 7.21/111/72--> 7.34/81/105  She has ALS related restrictive lung disease causing acute hypercapnia leading to mentation changes now improved significantly after BiPAP  Unclear if this is bulbar/non bulbar  Q 6 hrs NIF/VC , nocturnal oximetry monitoring  VC goal is above  15 to 20 mL/kg- she is at 1300 now which is appropriate patient is DNR/DNI  Patient is currently hospitalized with a diagnosis of Chronic Hypercapnic Respiratory Failure with hypoxia secondary to neuromuscular disease/ALS.  Patient demonstrates shortness of breath and the use of accessory muscles at rest.  While on BIPAP/AVAPS with the settings of 18/8 back up rate 12 patient still showed higher than normal CO2 levels of 81 and for that reason, I feel patient would benefit from NIV augmented volume ventilation with guaranteed targeted tidal volume not found on a standard PAP for home use, to better control the patient's disease process.  Without NIV, the patient’s condition will continue to deteriorate, which will lead to further exacerbation and patient harm.  Follow up VBG/XR  Thank you for the consult, will continue to monitor

## 2024-03-16 NOTE — PROGRESS NOTE ADULT - ASSESSMENT
64 y/o woman with PMHx of HLD, ALS - diagnosed 2 years ago, is on Radicava and Riluzole, presented to the hospital status post fall, difficulty swallowing and respiratory distress, has difficulty managing herself at home.  Patient has noted remarkable improvement with BiPAP, her mental sensorium has improved remarkably.    # ALS with respiratory distress    - Continue Radicava 105/5ml in AM fasting  - Riluzole 50 mg BID daily.    - BIPAP during night  - Patient needs support services, social work consult and possible placement    About discussed with patient and with Dr. Tyler.    Call neuro if needed henceforth

## 2024-03-16 NOTE — PROGRESS NOTE ADULT - SUBJECTIVE AND OBJECTIVE BOX
Patient awake and alert, has been tolerating BiPAP well, able to converse     ROS: As above,     MEDICATIONS  (STANDING):  amphetamine/dextroamphetamine 10 milliGRAM(s) Oral daily  atorvastatin 10 milliGRAM(s) Oral at bedtime  buPROPion XL (24-Hour) . 450 milliGRAM(s) Oral daily  enoxaparin Injectable 40 milliGRAM(s) SubCutaneous every 24 hours  escitalopram 10 milliGRAM(s) Oral daily  Radicava 105 mG/Day 5 milliLiter(s) Oral daily  riluzole 50 milliGRAM(s) Oral two times a day      Vital Signs Last 24 Hrs  T(C): 36.8 (16 Mar 2024 07:30), Max: 36.8 (16 Mar 2024 07:30)  T(F): 98.2 (16 Mar 2024 07:30), Max: 98.2 (16 Mar 2024 07:30)  HR: 80 (16 Mar 2024 07:30) (80 - 92)  BP: 121/86 (16 Mar 2024 07:30) (113/83 - 127/74)  BP(mean): --  RR: 18 (16 Mar 2024 07:30) (17 - 18)  SpO2: 93% (16 Mar 2024 07:30) (93% - 98%)    Parameters below as of 16 Mar 2024 07:30  Patient On (Oxygen Delivery Method): nasal cannula  O2 Flow (L/min): 2    General:   Cooperative, NAD   NECK: supple, no masses      Neurological Examination:  MS: AxOx3. Appropriately interactive, normal affect. Speech fluent but soft, no aphasia or dysarthria   CN:  PERLL, EOMI, V1-3 sensation intact, face symmetric, hearing intact, palate elevates symmetrically, tongue midline, SCM mildly weak bilaterally  Motor: Distal wasting, normal tone, no tremor.  UEs prox: 4/5,distal 3/5. LEs ~ 2/5 proximally, distally 0/5   Sens: Intact to light touch.    Reflexes: 0/4 all over, mute toes b/l  Coord:  No dysmetria   Gait: Cannot test    Labs:                     11.4   6.22  )-----------( 176      ( 15 Mar 2024 07:41 )             39.5     03-15    141  |  100  |  19  ----------------------------<  125<H>  4.1   |  41<H>  |  0.33<L>    Ca    9.3      15 Mar 2024 07:41

## 2024-03-16 NOTE — PROGRESS NOTE ADULT - SUBJECTIVE AND OBJECTIVE BOX
Patient is a 63y old  Female who presents with a chief complaint of Fall, Weakness (13 Mar 2024 16:27)      HPI:   62 y/o female with PMHx of HLD, ALS on radicava (wheelchair bound) presents to the ED Status post fall.  Patient notes increasing difficulty with transitions from the wheelchair to toilet.  Patient also endorses some shortness of breath and chest pain. Patient no longer feels that she can care for self at home.  Patient would like to be placed in a nursing facility.  Patient also complaining of ankle pain from fall.  Did not hit head did not lose consciousness.   Not seen Pulmonary in the past  Outpatient [facility] Neu[facility]         PAST MEDICAL & SURGICAL HISTORY:  ALS (amyotrophic lateral sclerosis)      HLD (hyperlipidemia)          PREVIOUS DIAGNOSTIC TESTING:      MEDICATIONS  (STANDING):  amphetamine/dextroamphetamine 5 milliGRAM(s) Oral daily  atorvastatin 10 milliGRAM(s) Oral at bedtime  buPROPion XL (24-Hour) . 450 milliGRAM(s) Oral daily  escitalopram 10 milliGRAM(s) Oral daily  Radicava 105 mG/Day 5 milliLiter(s) Oral daily  riluzole 50 milliGRAM(s) Oral two times a day    MEDICATIONS  (PRN):  acetaminophen     Tablet .. 650 milliGRAM(s) Oral every 6 hours PRN Mild Pain (1 - 3)  melatonin 5 milliGRAM(s) Oral at bedtime PRN Insomnia  ondansetron Injectable 4 milliGRAM(s) IV Push every 4 hours PRN Nausea and/or Vomiting  zolpidem 5 milliGRAM(s) Oral at bedtime PRN Insomnia      FAMILY HISTORY:      SOCIAL HISTORY:  ***    REVIEW OF SYSTEM:  Pertinent items are noted in HPI.    Vital Signs Last 24 Hrs  T(C): 36.5 (03-16-24 @ 04:15), Max: 37.1 (03-15-24 @ 16:00)  T(F): 97.7 (03-16-24 @ 04:15), Max: 98.7 (03-15-24 @ 16:00)  HR: 92 (03-16-24 @ 04:15) (89 - 102)  BP: 127/74 (03-16-24 @ 04:15) (113/83 - 127/74)  BP(mean): --  RR: 18 (03-16-24 @ 04:15) (17 - 18)  SpO2: 97% (03-16-24 @ 04:15) (94% - 98%)        PHYSICAL EXAM  General Appearance: cooperative, no acute distress,   HEENT: PERRL, conjunctiva clear, EOM's intact, non injected pharynx, no exudate, TM   normal  Neck: Supple, , no adenopathy, thyroid: not enlarged, no carotid bruit or JVD  Back: Symmetric, no  tenderness,no soft tissue tenderness  Lungs: Clear to auscultation bilateral,no adventitious breath sounds, normal   expiratory phase  Heart: Regular rate and rhythm, S1, S2 normal, no murmur, rub or gallop  Abdomen: Soft, non-tender, bowel sounds active , no hepatosplenomegaly  Extremities: no cyanosis or edema, no joint swelling  Skin: Skin color, texture normal, no rashes   Neurologic: Alert and Oriented     ECG:    LABS:                          12.8   9.11  )-----------( 225      ( 14 Mar 2024 07:51 )             44.4     03-13    143  |  104  |  28<H>  ----------------------------<  119<H>  4.0   |  37<H>  |  0.35<L>    Ca    9.5      13 Mar 2024 07:59    TPro  6.8  /  Alb  3.4  /  TBili  0.3  /  DBili  x   /  AST  17  /  ALT  37  /  AlkPhos  61  03-12          Pro BNP  -- 03-12 @ 23:03  D Dimer  184 03-12 @ 23:03    PT/INR - ( 12 Mar 2024 23:03 )   PT: 10.9 sec;   INR: 0.96 ratio         PTT - ( 12 Mar 2024 23:03 )  PTT:25.1 sec  Urinalysis Basic - ( 13 Mar 2024 07:59 )    Color: x / Appearance: x / SG: x / pH: x  Gluc: 119 mg/dL / Ketone: x  / Bili: x / Urobili: x   Blood: x / Protein: x / Nitrite: x   Leuk Esterase: x / RBC: x / WBC x   Sq Epi: x / Non Sq Epi: x / Bacteria: x      ABG - ( 14 Mar 2024 09:18 )  pH, Arterial: 7.21  pH, Blood: x     /  pCO2: 111   /  pO2: 72    / HCO3: 44    / Base Excess: 11.7  /  SaO2: 96.0                  RADIOLOGY & ADDITIONAL STUDIES:

## 2024-03-16 NOTE — PROGRESS NOTE ADULT - ASSESSMENT
63 F with Hx of Hyperlipidemia, ALS on Radicava (wheelchair bound) was admitted with a fall at home without any acute injuries.    S/p Fall at home  -  likely due to progressive ALS and gait instability  -  fall precautions  -  PT as tolerated    ALS  -  appears to be progressive, she is wheelchair bound at baseline and transfer to chair, does not walk  -  medications reviewed by Pharmacist not on Formulary:  Radicava 105/5ml and Riluzole 50mg BID daily (patient brought in her own - sent to pharmacy)  -  s/p speech and swallow today - cleared for regular diet with aspiration precautions  -  appreciate neuro and pulm consults  -  reviewed NIF 20-30 and -1500, continue to check q6 hours  -  continue pulse oximetry    Acute on Chronic Hypercapneic Respiratory Failure  -  likely due to progressive ALS with restrictive lung disease and possible medications resulting in respiratory depression and CO2 retention  -  reviewed her prior BMPs which indicate a mild metabolic alkalosis since 12/2023 which supports she likely has developed chronic hypercapnea  -  episode of dyspnea this morning, check CXR  -  continue BiPAP qHS, current settings are 15/5 35% with backup rate of 12  -  will keep off BIPAP during the day as long as she remains awake and alert, if becomes lethargic will resume BiPAP during the day  -  despite the PCO2 of 70-80, she is awake and alert now and able to carry conversations without falling asleep  -  check VBG today    Psych Disorder  -  Per pharmacy, noted on total of 450mg daily of Wellbutrin XR.  Lexapro 10mg daily.  Previously Prozac 10mg/ 20mg per pharmacy, Adderall 5mg q AM    Intermittent Nausea  -  IV Zofran PRN    Acute Metabolic Encephalopathy  -  due to hypercarbia  -  resolved    Hyperlipidemia  -  on statin, but on hold for now    DVT prophylaxis  -  venodynes and Lovenox    Code Status:   DNR/DNI and trial of NIV    Dispo:  will need rehab, likely next week    d/w RN, Dr. Arciniega, and case management   63 F with Hx of Hyperlipidemia, ALS on Radicava (wheelchair bound) was admitted with a fall at home without any acute injuries.    S/p Fall at home  -  likely due to progressive ALS and gait instability  -  fall precautions  -  PT as tolerated    ALS  -  appears to be progressive, she is wheelchair bound at baseline and transfer to chair, does not walk  -  medications reviewed by Pharmacist not on Formulary:  Radicava 105/5ml and Riluzole 50mg BID daily (patient brought in her own - sent to pharmacy)  -  s/p speech and swallow today - cleared for regular diet with aspiration precautions  -  appreciate neuro and pulm consults  -  reviewed NIF 20-30 and -1500, continue to check q6 hours  -  continue pulse oximetry    Acute on Chronic Hypercapneic Hypoxic Respiratory Failure  -  likely due to progressive ALS with restrictive lung disease and possible medications resulting in respiratory depression and CO2 retention  -  reviewed her prior BMPs which indicate a mild metabolic alkalosis since 12/2023 which supports she likely has developed chronic hypercapnea  -  episode of dyspnea this morning, check CXR  -  continue BiPAP qHS, current settings are 15/5 35% with backup rate of 12  -  will keep off BIPAP during the day as long as she remains awake and alert, if becomes lethargic will resume BiPAP during the day  -  despite the PCO2 of 70-80, she is awake and alert now and able to carry conversations without falling asleep  -  check VBG today  -  continue NS O2 at 1L/min to keep says >90%    Psych Disorder  -  Per pharmacy, noted on total of 450mg daily of Wellbutrin XR.  Lexapro 10mg daily.  Previously Prozac 10mg/ 20mg per pharmacy, Adderall 5mg q AM    Intermittent Nausea  -  IV Zofran PRN    Acute Metabolic Encephalopathy  -  due to hypercarbia  -  resolved    Hyperlipidemia  -  on statin, but on hold for now    DVT prophylaxis  -  venodynes and Lovenox    Code Status:   DNR/DNI and trial of NIV    Dispo:  will need rehab, likely next week    d/w RN, Dr. Arciniega, and case management

## 2024-03-17 NOTE — PROGRESS NOTE ADULT - ASSESSMENT
63 F with Hx of Hyperlipidemia, ALS on Radicava (wheelchair bound) was admitted with a fall at home without any acute injuries.    S/p Fall at home  -  likely due to progressive ALS and gait instability  -  fall precautions  -  PT as tolerated    ALS  -  appears to be progressive, she is wheelchair bound at baseline and transfer to chair, does not walk  -  medications reviewed by Pharmacist not on Formulary:  Radicava 105/5ml and Riluzole 50mg BID daily (patient brought in her own - sent to pharmacy)  -  s/p speech and swallow today - cleared for regular diet with aspiration precautions  -  reviewed NIF 10-15, and -800, change to TID monitoring  -  continue pulse oximetry and nasal cannula O2  -  appreciate pulm and neurology consults    Acute on Chronic Hypercapneic Hypoxic Respiratory Failure  -  likely due to progressive ALS with restrictive lung disease and possible medications resulting in respiratory depression and CO2 retention  -  reviewed her prior BMPs which indicate a mild metabolic alkalosis since 12/2023 which supports she likely has developed chronic hypercapnea  -  continue BiPAP qHS, current settings are 15/5 35% with backup rate of 12  -  will keep off BIPAP during the day as long as she remains awake and alert, if becomes lethargic will resume BiPAP during the day  -  despite the PCO2 of 70-80, she is awake and alert now and able to carry conversations without falling asleep  -  continue NS O2 at 1L/min to keep sats >90%    Psych Disorder  -  continue Wellbutrin 450mg daily, Lexapro 10mg daily, Adderall 10mg daily    Intermittent Nausea  -  IV Zofran PRN    Acute Metabolic Encephalopathy  -  due to hypercarbia  -  resolved    Hyperlipidemia  -  on statin, but on hold for now    DVT prophylaxis  -  venodynes and Lovenox    Code Status:   DNR/DNI and trial of NIV    Dispo:  will need rehab this week, she is requesting [facility]

## 2024-03-17 NOTE — PROGRESS NOTE ADULT - SUBJECTIVE AND OBJECTIVE BOX
Chart and meds reviewed.  Patient seen and examined.    3/17 - no events overnight, feels weak and tired, not much of an appetite, no nausea or vomiting, + intermittent headache    All other systems reviewed and found to be negative with the exception of what has been described above.    MEDICATIONS  (STANDING):  amphetamine/dextroamphetamine 10 milliGRAM(s) Oral daily  atorvastatin 10 milliGRAM(s) Oral at bedtime  buPROPion XL (24-Hour) . 450 milliGRAM(s) Oral daily  enoxaparin Injectable 40 milliGRAM(s) SubCutaneous every 24 hours  escitalopram 10 milliGRAM(s) Oral daily  Radicava 105 mG/Day 5 milliLiter(s) Oral daily  riluzole 50 milliGRAM(s) Oral two times a day    MEDICATIONS  (PRN):  acetaminophen     Tablet .. 650 milliGRAM(s) Oral every 6 hours PRN Mild Pain (1 - 3)  ibuprofen  Tablet. 600 milliGRAM(s) Oral every 6 hours PRN Moderate Pain (4 - 6)  melatonin 5 milliGRAM(s) Oral at bedtime PRN Insomnia  ondansetron Injectable 4 milliGRAM(s) IV Push every 4 hours PRN Nausea and/or Vomiting  zolpidem 5 milliGRAM(s) Oral at bedtime PRN Insomnia    VITAL SIGNS:  T(F): 97.7 (03-17-24 @ 08:00), Max: 97.7 (03-16-24 @ 16:00)  HR: 86 (03-17-24 @ 08:49) (84 - 94)  BP: 117/76 (03-17-24 @ 08:00) (111/66 - 119/89)  RR: 18 (03-17-24 @ 08:49) (18 - 18)  SpO2: 94% (03-17-24 @ 08:49) (94% - 97%)    PHYSICAL EXAM:  HEENT:  pupils equal and reactive, EOMI, no oropharyngeal lesions, erythema, exudates, oral thrush  NECK:   supple, no carotid bruits, no palpable lymph nodes, no thyromegaly  CV:  +S1, +S2, regular, no murmurs  RESP:   lungs clear to auscultation bilaterally, no wheezing, rales, rhonchi, good air entry bilaterally  GI:  abdomen soft, non-tender, non-distended, normal BS  EXT:  no clubbing, no cyanosis, no edema, no calf pain, swelling or erythema  NEURO:  AAOX3, chronic leg weakness L>R, follows all commands, able to move extremities spontaneously  SKIN:  no ulcers, lesions or rashes    LABS:

## 2024-03-18 NOTE — PHYSICAL THERAPY INITIAL EVALUATION ADULT - PERTINENT HX OF CURRENT PROBLEM, REHAB EVAL
62 y/o female with PMHx of HLD, ALS on radicava (wheelchair bound) presents to the ED Status post fall.  Patient notes increasing difficulty with transitions from the wheelchair to toilet.  Patient also endorses some shortness of breath and chest pain. Patient no longer feels that she can care for self at home.  Patient would like to be placed in a nursing facility.  Patient also complaining of ankle pain from fall.  Did not hit head did not lose consciousness.
64 y/o female with PMHx of HLD, ALS on radicava (wheelchair bound) presents to the ED Status post fall.  Patient notes increasing difficulty with transitions from the wheelchair to toilet.  Patient also endorses some shortness of breath and chest pain. Patient no longer feels that she can care for self at home.  Patient would like to be placed in a nursing facility.  Patient also complaining of ankle pain from fall.  Did not hit head did not lose consciousness.

## 2024-03-18 NOTE — PROGRESS NOTE ADULT - ASSESSMENT
63 F with Hx of Hyperlipidemia, ALS on Radicava (wheelchair bound) was admitted with a fall at home without any acute injuries.    S/p Fall at home  -  likely due to progressive ALS and gait instability  -  fall precautions  -  PT as tolerated    ALS  -  appears to be progressive, she is wheelchair bound at baseline and transfer to chair, does not walk  -  medications reviewed by Pharmacist not on Formulary:  Radicava 105/5ml and Riluzole 50mg BID daily (patient brought in her own - sent to pharmacy)  -  s/p speech and swallow - cleared for regular diet with aspiration precautions  -  reviewed NIF 10-15, and -800, change to TID monitoring  -  continue pulse oximetry and nasal cannula O2  -  appreciate pulm and neurology consults    Acute on Chronic Hypercapneic Hypoxic Respiratory Failure  -  likely due to progressive ALS with restrictive lung disease and possible medications resulting in respiratory depression and CO2 retention  -  reviewed her prior BMPs which indicate a mild metabolic alkalosis since 12/2023 which supports she likely has developed chronic hypercapnea  -  continue BiPAP qHS, current settings are 15/5 35% with backup rate of 12  -  will keep off BIPAP during the day as long as she remains awake and alert, if becomes lethargic will resume BiPAP during the day  -  despite the PCO2 of 70-80, she is awake and alert now and able to carry conversations without falling asleep  -  continue NS O2 at 1L/min to keep sats >90%    Psych Disorder  -  continue Wellbutrin 450mg daily, Lexapro 10mg daily, Adderall 10mg daily    Intermittent Nausea  -  IV Zofran PRN    Acute Metabolic Encephalopathy  -  due to hypercarbia  -  resolved    Hyperlipidemia  -  on statin, but on hold for now    DVT prophylaxis  -  venodynes and Lovenox    Code Status:   DNR/DNI and trial of NIV    Dispo:  will need rehab this week, [facility] is [facility]

## 2024-03-18 NOTE — PHYSICAL THERAPY INITIAL EVALUATION ADULT - SOCIAL CONCERNS
pt  expressed major depression, on meds, due to her illness. wants to be alive for her daughter's to graduate college.
pt  expressed major depression, on meds, due to her illness. wants to be alive for her daughter's to graduate college.

## 2024-03-18 NOTE — PHYSICAL THERAPY INITIAL EVALUATION ADULT - LIVES WITH, PROFILE
pt has 2 college age dtrs, aide for 1x/week for 4 hrs/alone
pt has 2 college age dtrs, aide for 1x/week for 4 hrs/alone

## 2024-03-18 NOTE — PROGRESS NOTE ADULT - SUBJECTIVE AND OBJECTIVE BOX
HPI:   " 62 y/o female with PMHx of HLD, ALS on radicava (wheelchair bound) presents to the ED Status post fall.  Patient notes increasing difficulty with transitions from the wheelchair to toilet.  Patient also endorses some shortness of breath and chest pain. Patient no longer feels that she can care for self at home.  Patient would like to be placed in a nursing facility.  Patient also complaining of ankle pain from fall.  Did not hit head did not lose consciousness. "      3/13  pt seenearly morning very somnolent  chart reviewed pt recieved Ambien 10mg last night.   PT able to respond but very tired/fatigued  followed simple commands    later she was eating breakfast reports sleepyness.  typically if she uses ambien she takes 5mg OR will take melatonin  Both ordered PRN as patient will let us know when she needs this.     She doesnt like to take ambien every day but has been on it for some time.       Goals of care and Advanced directives discussed    3/18  pt seen and examined  more alert not on bipap  feeling ok- states bipap isnt very comfortable but slept ok  denies any pain nausea or vomiting  comfortable this morning   felt like seh was still waking up   pall team following- awaiting placment    PAIN: ( )Yes   ( x)No     DYSPNEA: ( ) Yes  (x  ) No  Level:      Review of Systems:     Anxiety- denies  Depression-denies  Physical Discomfort- in NAD  Dyspnea-denies  Constipation-denies  Diarrhea-denies  Nausea-denies  Vomiting-denies  Anorexia-+  Weight Loss- +  Cough-denies  Secretions-denies  Fatigue-+  Weakness-+  Delirium-denies    All other systems reviewed and negative       PHYSICAL EXAM:  ICU Vital Signs Last 24 Hrs  T(C): 36.9 (18 Mar 2024 16:00), Max: 36.9 (18 Mar 2024 16:00)  T(F): 98.4 (18 Mar 2024 16:00), Max: 98.4 (18 Mar 2024 16:00)  HR: 104 (18 Mar 2024 16:00) (87 - 104)  BP: 127/77 (18 Mar 2024 16:00) (121/75 - 127/77)  BP(mean): --  ABP: --  ABP(mean): --  RR: 18 (18 Mar 2024 16:00) (18 - 18)  SpO2: 91% (18 Mar 2024 16:00) (91% - 98%)    O2 Parameters below as of 18 Mar 2024 16:00  Patient On (Oxygen Delivery Method): nasal cannula  O2 Flow (L/min): 1        PPSV2:  30 %  FAST:    General: calm in NAD  Mental Status: awake alert oriented x3  HEENT: eomi, perrl  Lungs: ctabl b/l bs  Cardiac: s1s2 no mgr  GI: soft nontender +BS  : voids  Ext: moves all 4 extremities spontaneously  Neuro: no gross findings       LABS:                        13.3   9.07  )-----------( 229      ( 13 Mar 2024 07:59 )             44.6     03-13    143  |  104  |  28<H>  ----------------------------<  119<H>  4.0   |  37<H>  |  0.35<L>    Ca    9.5      13 Mar 2024 07:59    TPro  6.8  /  Alb  3.4  /  TBili  0.3  /  DBili  x   /  AST  17  /  ALT  37  /  AlkPhos  61  03-12    PT/INR - ( 12 Mar 2024 23:03 )   PT: 10.9 sec;   INR: 0.96 ratio         PTT - ( 12 Mar 2024 23:03 )  PTT:25.1 sec  Albumin: Albumin: 3.4 g/dL (03-12 @ 23:03)      Allergies    No Known Allergies    Intolerances      MEDICATIONS  (STANDING):  amphetamine/dextroamphetamine 5 milliGRAM(s) Oral daily  atorvastatin 10 milliGRAM(s) Oral at bedtime  buPROPion XL (24-Hour) . 450 milliGRAM(s) Oral daily  escitalopram 10 milliGRAM(s) Oral daily  zolpidem 10 milliGRAM(s) Oral at bedtime    MEDICATIONS  (PRN):      RADIOLOGY/ADDITIONAL STUDIES:

## 2024-03-18 NOTE — PHYSICAL THERAPY INITIAL EVALUATION ADULT - ADDITIONAL COMMENTS
patient lives by herself, has a motorized wheelchair to get around at home,
patient lives by herself, has a motorized wheelchair to get around at home   Has a bedside commode

## 2024-03-18 NOTE — PHYSICAL THERAPY INITIAL EVALUATION ADULT - LEVEL OF INDEPENDENCE: BED TO CHAIR, REHAB EVAL
pt unable to stand sec to Rt ankle pain/ b/l foot drop and gross weakness/unable to perform
pt unable to stand sec to b/l foot drop and gross weakness/unable to perform

## 2024-03-18 NOTE — PROGRESS NOTE ADULT - SUBJECTIVE AND OBJECTIVE BOX
Chart and meds reviewed.  Patient seen and examined.    3/17 - no events overnight, feels weak and tired, not much of an appetite, no nausea or vomiting, + intermittent headache  3/18 - no overnight events. no cp, sob.      All other systems reviewed and found to be negative with the exception of what has been described above.    Vital Signs Last 24 Hrs  T(C): 36.2 (18 Mar 2024 08:25), Max: 36.8 (18 Mar 2024 04:00)  T(F): 97.2 (18 Mar 2024 08:25), Max: 98.2 (18 Mar 2024 04:00)  HR: 95 (18 Mar 2024 08:25) (87 - 98)  BP: 123/79 (18 Mar 2024 08:25) (115/82 - 126/77)  BP(mean): --  RR: 18 (18 Mar 2024 08:25) (18 - 18)  SpO2: 95% (18 Mar 2024 08:25) (95% - 98%)    Parameters below as of 18 Mar 2024 08:25  Patient On (Oxygen Delivery Method): nasal cannula          PHYSICAL EXAM:  HEENT:  pupils equal and reactive, EOMI, no oropharyngeal lesions, erythema, exudates, oral thrush  NECK:   supple, no carotid bruits, no palpable lymph nodes, no thyromegaly  CV:  +S1, +S2, regular, no murmurs  RESP:   lungs clear to auscultation bilaterally, no wheezing, rales, rhonchi, good air entry bilaterally  GI:  abdomen soft, non-tender, non-distended, normal BS  EXT:  no clubbing, no cyanosis, no edema, no calf pain, swelling or erythema  NEURO:  AAOX3, chronic leg weakness L>R, follows all commands, able to move extremities spontaneously  SKIN:  no ulcers, lesions or rashes    LABS:

## 2024-03-18 NOTE — PHYSICAL THERAPY INITIAL EVALUATION ADULT - SITTING BALANCE: DYNAMIC
poor balance
poor balance
Terbinafine Counseling: Patient counseling regarding adverse effects of terbinafine including but not limited to headache, diarrhea, rash, upset stomach, liver function test abnormalities, itching, taste/smell disturbance, nausea, abdominal pain, and flatulence.  There is a rare possibility of liver failure that can occur when taking terbinafine.  The patient understands that a baseline LFT and kidney function test may be required. The patient verbalized understanding of the proper use and possible adverse effects of terbinafine.  All of the patient's questions and concerns were addressed.

## 2024-03-18 NOTE — PROGRESS NOTE ADULT - ASSESSMENT
Process of Care  --Reviewed dx/treatment problems and alignment with Goals of Care    Physical Aspects of Care  --Pain  patient denies at this time  c/w current management    --Bowel Regimen  denies constipation  risk for constipation d/t immobility  daily dulcolax    --Dyspnea  No SOB at this time  comfortable and in NAD    --Nausea Vomiting  denies    --Weakness  PT as tolerated     --insomnia  melatonin 5mg Qhs prn   Ambien 5mg QHS PRN (home dose) pt tries to avoid using it frequently      Psychological and Psychiatric Aspects of Care:   --Greif/Bereavment: emotional support provided  --Hx of psychiatric dx: none  -Pastoral Care Available PRN     Social Aspects of Care  -SW involved     Cultural Aspects  -Primary Language: English    Goals of Care:     We discussed Palliative Care team being a supportive team when a patient has ongoing illnesses.  We also discussed that it is not an end of life care service, but can help navigate symptoms and emotional support througout their hospital stay here.       Ethical and Legal Aspects:   NA        Capacity: pt w/ capacity  HCP/ Surrogate: daughters are surrogate decision maker  Code Status:  MOLST:  Dispo Plan:    Discussed With: Case coordinated with attending and SW and RN     Time Spent: 50 minutes including the care, coordination and counseling of this patient, 50% of which was spent coordinating and counseling.

## 2024-03-18 NOTE — PHYSICAL THERAPY INITIAL EVALUATION ADULT - MANUAL MUSCLE TESTING RESULTS, REHAB EVAL
BUE: 3-/5, gross generalized weakness, BLE foot drop+, 1/5, knee and hip 3-/5/grossly assessed due to
BUE: 3-/5, gross generalized weakness, BLE foot drop+, 1/5, knee and hip 3-/5/grossly assessed due to

## 2024-03-18 NOTE — PHYSICAL THERAPY INITIAL EVALUATION ADULT - GENERAL OBSERVATIONS, REHAB EVAL
Pt found supine in bed on 2N, +tele monitoring, in NAD, agreeable to participate in PT Evaluation. Pt found supine in bed on 2N, +tele monitoring, in NAD, agreeable to participate in PT Evaluation. Pt is able to perform bed mobility with ModA and sit at the edge of the bed with Boni. Pt is unwilling to attempt transfer or gait training secondary to B LE weakness. Pt returned to bed with call bell and phone in reach, bed alarm on, JERED Smith is aware.

## 2024-03-18 NOTE — PHYSICAL THERAPY INITIAL EVALUATION ADULT - IMPAIRMENTS FOUND, PT EVAL
aerobic capacity/endurance/fine motor/gait, locomotion, and balance/gross motor/muscle strength
aerobic capacity/endurance/fine motor/gait, locomotion, and balance/gross motor/muscle strength

## 2024-03-19 NOTE — PROGRESS NOTE ADULT - SUBJECTIVE AND OBJECTIVE BOX
Chart and meds reviewed.  Patient seen and examined.    3/17 - no events overnight, feels weak and tired, not much of an appetite, no nausea or vomiting, + intermittent headache  3/18 - no overnight events. no cp, sob.    3/19 - no overnight events. pt agreeable to reahb     All other systems reviewed and found to be negative with the exception of what has been described above.      Vital Signs Last 24 Hrs  T(C): 36.8 (19 Mar 2024 08:00), Max: 36.9 (18 Mar 2024 16:00)  T(F): 98.2 (19 Mar 2024 08:00), Max: 98.4 (18 Mar 2024 16:00)  HR: 97 (19 Mar 2024 08:00) (87 - 104)  BP: 107/77 (19 Mar 2024 08:00) (107/77 - 127/77)  BP(mean): --  RR: 17 (19 Mar 2024 08:00) (17 - 18)  SpO2: 99% (19 Mar 2024 08:00) (91% - 99%)    Parameters below as of 19 Mar 2024 08:00  Patient On (Oxygen Delivery Method): nasal cannula  O2 Flow (L/min): 1          PHYSICAL EXAM:  HEENT:  pupils equal and reactive, EOMI, no oropharyngeal lesions, erythema, exudates, oral thrush  NECK:   supple, no carotid bruits, no palpable lymph nodes, no thyromegaly  CV:  +S1, +S2, regular, no murmurs  RESP:   lungs clear to auscultation bilaterally, no wheezing, rales, rhonchi, good air entry bilaterally  GI:  abdomen soft, non-tender, non-distended, normal BS  EXT:  no clubbing, no cyanosis, no edema, no calf pain, swelling or erythema  NEURO:  AAOX3, chronic leg weakness L>R, follows all commands, able to move extremities spontaneously  SKIN:  no ulcers, lesions or rashes    LABS:    labs reviewed

## 2024-03-20 NOTE — DISCHARGE NOTE PROVIDER - PROVIDER TOKENS
PROVIDER:[TOKEN:[[id number]]],PROVIDER:[TOKEN:[[id number]]],PROVIDER:[TOKEN:[[id number]]],PROVIDER:[TOKEN:[[id number]]]

## 2024-03-20 NOTE — DISCHARGE NOTE PROVIDER - NSDCCPCAREPLAN_GEN_ALL_CORE_FT
PRINCIPAL DISCHARGE DIAGNOSIS  Diagnosis: Fall  Assessment and Plan of Treatment: due to ALS  plan for rehab        SECONDARY DISCHARGE DIAGNOSES  Diagnosis: Shortness of breath  Assessment and Plan of Treatment:

## 2024-03-20 NOTE — DISCHARGE NOTE PROVIDER - CARE PROVIDERS DIRECT ADDRESSES
[email or web address]

## 2024-03-20 NOTE — DISCHARGE NOTE PROVIDER - HOSPITAL COURSE
63 F with Hx of Hyperlipidemia, ALS on Radicava (wheelchair bound) was admitted with a fall at home without any acute injuries. course complicated by acute on chronic hypercapneic hypoxic resp failure and seen by pulm.  she will need bipap at [facility].  pt to f/u with dr torres as outpt after rehab.  for ALS she follows up with specialists int [facility] but seen by dr berry from neuro and can f/u with him if she prefers after rehab. pt also seen by palliative and MOLST completed.     S/p Fall at home  -  likely due to progressive ALS and gait instability  -  fall precautions  -  PT as tolerated    ALS  -  appears to be progressive, she is wheelchair bound at baseline and transfer to chair, does not walk but may improve some at rehab.  -  continue Radicava 105/5ml and Riluzole 50mg BID daily (patient brought in her own)  -  s/p speech and swallow - cleared for regular diet with aspiration precautions  -  reviewed NIF 10-15, and -800, change to TID monitoring  -  continue pulse oximetry and nasal cannula O2  -  appreciate pulm and neurology consults    Acute on Chronic Hypercapneic Hypoxic Respiratory Failure  -  likely due to progressive ALS with restrictive lung disease and possible medications resulting in respiratory depression and CO2 retention  -  reviewed her prior BMPs which indicate a mild metabolic alkalosis since 12/2023 which supports she likely has developed chronic hypercapnea  -  continue BiPAP qHS, current settings are 15/5 35% with backup rate of 12and keep on NC 2 liters when off   -  will keep off BIPAP during the day as long as she remains awake and alert, if becomes lethargic will resume BiPAP during the day  -  despite the PCO2 of 70-80, she is awake and alert now and able to carry conversations without falling asleep    Psych Disorder/anxiety  -  continue Wellbutrin 450mg daily, Lexapro 10mg daily, Adderall 10mg daily    Intermittent Nausea  - zofran prn     Acute Metabolic Encephalopathy  -  due to hypercarbia  -  resolved    Hyperlipidemia  -  on statin, but on hold for now    DVT prophylaxis  -  venodynes and Lovenox    Code Status:   DNR/DNI and trial of NIV    Dispo:  rehab today

## 2024-03-20 NOTE — DISCHARGE NOTE NURSING/CASE MANAGEMENT/SOCIAL WORK - NSDCPEFALRISK_GEN_ALL_CORE
For information on Fall & Injury Prevention, visit: [email or web address] OR  [email or web address] OR  https://www.cdc.gov/steadi/patient.html

## 2024-03-20 NOTE — DISCHARGE NOTE PROVIDER - CARE PROVIDER_API CALL
Faustino Faustin  Internal Medicine  [address] [place] [address]  Phone: [phone]  Fax: [phone]  Follow Up Time:     Derek Arciniega  Pulmonary Disease  [address] [place] [address]  Phone: [phone]  Fax: [phone]  Follow Up Time:     Uriel Walker  Neurology  [address] [place] [address], [address]  [place] [address]  Phone: [phone]  Fax: [phone]  Follow Up Time:     Chris Padgett  Gastroenterology  [address] [place] [address]  Phone: [phone]  Fax: [phone]  Follow Up Time:

## 2024-03-20 NOTE — DISCHARGE NOTE PROVIDER - INSTRUCTIONS
Diet, Regular:   Supplement Feeding Modality:  Oral  Ensure Enlive Cans or Servings Per Day:  3       Frequency:  Three Times a day (03-18-24 @ 08:37) [Active]

## 2024-03-20 NOTE — PROGRESS NOTE ADULT - REASON FOR ADMISSION
Fall, Weakness

## 2024-03-20 NOTE — PROGRESS NOTE ADULT - SUBJECTIVE AND OBJECTIVE BOX
Chart and meds reviewed.  Patient seen and examined.    3/17 - no events overnight, feels weak and tired, not much of an appetite, no nausea or vomiting, + intermittent headache  3/18 - no overnight events. no cp, sob.    3/19 - no overnight events. pt agreeable to rehab  3/20 - no overnight events.      All other systems reviewed and found to be negative with the exception of what has been described above.      Vital Signs Last 24 Hrs  T(C): 36.8 (19 Mar 2024 08:00), Max: 36.9 (18 Mar 2024 16:00)  T(F): 98.2 (19 Mar 2024 08:00), Max: 98.4 (18 Mar 2024 16:00)  HR: 97 (19 Mar 2024 08:00) (87 - 104)  BP: 107/77 (19 Mar 2024 08:00) (107/77 - 127/77)  BP(mean): --  RR: 17 (19 Mar 2024 08:00) (17 - 18)  SpO2: 99% (19 Mar 2024 08:00) (91% - 99%)    Parameters below as of 19 Mar 2024 08:00  Patient On (Oxygen Delivery Method): nasal cannula  O2 Flow (L/min): 1      PHYSICAL EXAM:  HEENT:  pupils equal and reactive, EOMI, no oropharyngeal lesions, erythema, exudates, oral thrush  NECK:   supple, no carotid bruits, no palpable lymph nodes, no thyromegaly  CV:  +S1, +S2, regular, no murmurs  RESP:   lungs clear to auscultation bilaterally, no wheezing, rales, rhonchi, good air entry bilaterally  GI:  abdomen soft, non-tender, non-distended, normal BS  EXT:  no clubbing, no cyanosis, no edema, no calf pain, swelling or erythema  NEURO:  AAOX3, chronic leg weakness L>R, follows all commands, able to move extremities spontaneously  SKIN:  no ulcers, lesions or rashes    LABS:    labs reviewed

## 2024-03-20 NOTE — DISCHARGE NOTE NURSING/CASE MANAGEMENT/SOCIAL WORK - PATIENT PORTAL LINK FT
You can access the FollowMyHealth Patient Portal offered by [facility] by registering at the following website: [email or web address]. By joining [employer]’s FollowMyHealth portal, you will also be able to view your health information using other applications (apps) compatible with our system.

## 2024-03-20 NOTE — PROGRESS NOTE ADULT - PROVIDER SPECIALTY LIST ADULT
Hospitalist
Pulmonology
Neurology
Palliative Care
Hospitalist
Pulmonology

## 2024-03-20 NOTE — PROGRESS NOTE ADULT - ASSESSMENT
63 F with Hx of Hyperlipidemia, ALS on Radicava (wheelchair bound) was admitted with a fall at home without any acute injuries.    S/p Fall at home  -  likely due to progressive ALS and gait instability  -  fall precautions  -  PT as tolerated    ALS  -  appears to be progressive, she is wheelchair bound at baseline and transfer to chair, does not walk  -  medications reviewed by Pharmacist not on Formulary:  Radicava 105/5ml and Riluzole 50mg BID daily (patient brought in her own - sent to pharmacy)  -  s/p speech and swallow - cleared for regular diet with aspiration precautions  -  reviewed NIF 10-15, and -800, change to TID monitoring  -  continue pulse oximetry and nasal cannula O2  -  appreciate pulm and neurology consults    Acute on Chronic Hypercapneic Hypoxic Respiratory Failure  -  likely due to progressive ALS with restrictive lung disease and possible medications resulting in respiratory depression and CO2 retention  -  reviewed her prior BMPs which indicate a mild metabolic alkalosis since 12/2023 which supports she likely has developed chronic hypercapnea  -  continue BiPAP qHS, current settings are 15/5 35% with backup rate of 12  -  will keep off BIPAP during the day as long as she remains awake and alert, if becomes lethargic will resume BiPAP during the day  -  despite the PCO2 of 70-80, she is awake and alert now and able to carry conversations without falling asleep  -  continue NS O2 at 1L/min to keep sats >90%    Psych Disorder  -  continue Wellbutrin 450mg daily, Lexapro 10mg daily, Adderall 10mg daily    Intermittent Nausea  -  IV Zofran PRN    Acute Metabolic Encephalopathy  -  due to hypercarbia  -  resolved    Hyperlipidemia  -  on statin, but on hold for now    DVT prophylaxis  -  venodynes and Lovenox    Code Status:   DNR/DNI and trial of NIV    Dispo:  will need rehab this week, she is [facility] now awaiting auth

## 2024-03-20 NOTE — DISCHARGE NOTE PROVIDER - NSDCMRMEDTOKEN_GEN_ALL_CORE_FT
acetaminophen 325 mg oral tablet: 2 tab(s) orally every 6 hours As needed Mild Pain (1 - 3)  Adderall 5 mg oral tablet: 1 tab(s) orally once a day  edaravone 105 mg/5 mL oral suspension: 5 milliliter(s) orally once a day Unable to order IV injectable  enoxaparin: 40 milligram(s) subcutaneous once a day  Lexapro 10 mg oral tablet: 1 tab(s) orally  Lipitor 10 mg oral tablet: 1 tab(s) orally once a day  melatonin 5 mg oral tablet: 1 tab(s) orally once a day (at bedtime) As needed Insomnia  ondansetron 4 mg oral tablet, disintegratin tab(s) orally every 6 hours As needed Nausea and/or Vomiting  riluzole 50 mg oral tablet: 1 tab(s) orally 2 times a day  Wellbutrin  mg/24 hours oral tablet, extended release: 1 tab(s) orally 3 times a day  zolpidem 5 mg oral tablet: 1 tab(s) orally once a day (at bedtime) As needed Insomnia

## 2024-04-28 NOTE — ED PROVIDER NOTE - PROGRESS NOTE DETAILS
on reassessment pt very sleepy, per friend she is supposed to wear bipap at night, labs, ekg CXR and bipap ordered Pt still having headache, will continue to monitor, CT head and neck show no acute abnormalities on reassessment pt very sleepy, per family she usually wears bipap at night, labs, ekg CXR and bipap ordered Pt found to be hypercapnic, now more awake and alert on the bipap, unable to admit to floor in setting of needing continuous bipap, spoke with icu pa who will eval for admission to icu or step down Sarabjit JACQUES: Received sign out from Dr. De Paz. ICU spoke with Dr. De Paz, reviewed patient's documentation, advanced ALS, molst in place, intermittent Bipap need, blood gas similar to previous finding with often retention of CO2 as per ICU, ICU (Mr Martinez PA) states no need for pulmonary consult or ICU admission, patient as per ICU to go to floor. ICU to produce a note momentarily. Patient is chronically ill, worsening ALS. Currently stable on Bipap. Spoke with Dr. Boss, hospitalist admission of the patient is appreciated, MS, WILMA.

## 2024-04-28 NOTE — ED ADULT NURSE NOTE - OBJECTIVE STATEMENT
Pt is 63y F, A&Ox3 who presents to ED with c/o headache. pt also endorses neck pain. Pt reports pain began today.  Pt states she was given xanax at facility earlier today. pt on 3L NC at baseline. pt does not appear in acute distress. Pt breathing unlabored and spontaneusly  Pt denies photophobia, dizziness, chest pain, SOB, n/v, numbness, tingling Pt is 63y F, A&Ox3 who presents to ED with c/o headache. pt also endorses neck pain. Pt reports pain began today.  Pt states she was given xanax at facility earlier today. pt on 3L NC at baseline. pt does not appear in acute distress. Pt breathing unlabored and spontaneously  Pt denies photophobia, dizziness, chest pain, SOB, n/v, numbness, tingling  hx of ALS

## 2024-04-28 NOTE — ED ADULT NURSE REASSESSMENT NOTE - NS ED NURSE REASSESS COMMENT FT1
Pt breathing unlabored and spontaneously. pt has no complaints at this time. Pt does not appear in discomfort or distress at this time. Pt updated on plan of care. Safety and comfort maintained.

## 2024-04-28 NOTE — ED PROVIDER NOTE - OBJECTIVE STATEMENT
63 F with Hx of Hyperlipidemia, ALS on Radicava, HLD, wheelchair bound presents with 3 days of intermittent HA starting in her neck and radiating into the back of her head. Pt lives at [facility], has been taking tylenol only transient improvement in symptoms. No fever, chills, neck stiffness, recent trauma, vision changes, new numbness or weakness, urinary symptoms, cp, sob, cough, nvd, abdominal pain.

## 2024-04-28 NOTE — ED ADULT NURSE REASSESSMENT NOTE - NS ED NURSE REASSESS COMMENT FT1
Pt placed on Bipap as pt wears Bipap at night. pt c/o mild headache. Pt does not appear in respiratory distress but appears sleepy. MD at bedside. safety and comfort maintained.

## 2024-04-28 NOTE — ED ADULT NURSE NOTE - PRIMARY CARE PROVIDER

## 2024-04-28 NOTE — ED ADULT NURSE REASSESSMENT NOTE - NS ED NURSE REASSESS COMMENT FT1
Pt reports improvement in headache and neck pain s/p medication. pt has no other complaints at this time. pt does not appear in discomfort or distress at this time. pt updated on plan of care. Family at bedside. Safety and comfort maintained.

## 2024-04-28 NOTE — ED ADULT TRIAGE NOTE - CHIEF COMPLAINT QUOTE
pt bibems from [facility] c/o headache radiating down the back. Pt A&ox4, DNR/DNI. on 3L NC at baseline. - allergies.

## 2024-04-28 NOTE — ED PROVIDER NOTE - PHYSICAL EXAMINATION
Constitutional: chronically ill appearing but resting comforably, NAD AAOx3  Eyes: EOMI, PERRL  Head: Normocephalic atraumatic  Mouth: no airway obstruction, posterior oropharynx clear without erythema or exudate  Neck: supple, b/l paraspinal tenderness  Cardiac: regular rate and rhythm, no MRG  Resp: Lungs CTAB  GI: Abd s/nt/nd  Neuro: CN2-12 intact, strength 5/5 b/l UE, wiggles toes b/l LE, sensation grossly intact  Skin: No rashes

## 2024-04-29 NOTE — H&P ADULT - ASSESSMENT
63 year old female w  ALS, wheelchair bound, HLD  presents w 3 days of intermittent HA    #Intractable headache and   #Neck pain  +TTP over R parieto-temp scalp and R neck mm  1. place on med OBS  2. pt states no relief in ED w meds  3. Mg 1 g IV/ valium 2 mg IV by me/zofran 4 mg Istat  4. warm compress to scalp and to neck  5. NS 2000 cc in ED  6. s/p reglan/ iv tylenol/ methacarbamol 750 mg  7. Neuro consult placed by me      #Recent anxiety  xanax was given 04-26 + 04-27 as 0.25 mg     #ALS  1. on riluzole BID only  2. adderall was increased from 5to 10 at NH      #Depression  1. lexapro was DCd prior to ED arrival  2. prozac 30 mg qd  3. Wellbutrin 450 mg qd        #Dysphagia  has been able to tolerate reg diet w thin liquids  1. diet ordered      #HLD  1. statin      #GOC  has seen Palliative last admission  DNR/trial of NIV  no feeding tube          #VTE  on lovenox        #80 minutes 63 year old female w  ALS, wheelchair bound, HLD  presents w 3 days of intermittent HA    #Intractable headache and   #Neck pain  suspect muscle contraction/tension HA doubt migraine  +TTP over vertex to R temp-parietal scalp and R neck mm  1. place on med OBS  2. pt states no relief in ED w meds  3. Mg 1 g IV/ valium 2 mg IV by me /zofran 4 mg Istat  4. warm compress to scalp and to neck  5. NS 2000 cc in ED  6. s/p reglan/ iv tylenol/ methacarbamol 750 mg  7. Neuro consult placed by me      #Recent anxiety  xanax was given 04-26 + 04-27 as 0.25 mg       #ALS  1. riluzole BID , spoke to pharmacy  2. adderall was increased from 5 to 10 at NH  3. she stopped the other agent as it was not working      #Depression  1. lexapro was DCd prior  2. prozac 30 mg qd  3. Wellbutrin 450 mg qd        #Dysphagia  has been able to tolerate reg diet w thin liquids  1. diet ordered      #HLD  1. statin      #GOC  has seen Palliative last admission  DNR/trial of NIV  no feeding tube          #VTE  on lovenox        #80 minutes 63 year old female w  ALS, wheelchair bound, HLD  presents w 3 days of intermittent HA    #Intractable headache and   #Neck pain  suspect muscle contraction/tension HA doubt migraine  +TTP over vertex to R temp-parietal scalp and R neck mm  1. place on med OBS  2. pt states no relief in ED w meds  3. Mg 1 g IV/ valium 2 mg IV by me /zofran 4 mg Istat  4. warm compress to scalp and to neck  5. NS 2000 cc in ED  6. s/p reglan/ iv tylenol/ methacarbamol 750 mg  7. Neuro consult placed by me      #Recent anxiety  xanax was given 04-26 + 04-27 as 0.25 mg       #ALS  1. riluzole BID , spoke to pharmacy  2. adderall was increased from 5 to 10 at NH  3. she stopped the other agent as it was not working      #Chronic hypoxic resp failure  1. 2 L nc   2. BiPAP q HS      #Depression  1. lexapro was DCd prior  2. prozac 30 mg qd  3. Wellbutrin 450 mg qd        #Dysphagia  has been able to tolerate reg diet w thin liquids  1. diet ordered      #HLD  1. statin      #Protein calorie malnutriton  1. ensure hi protein 1 can TID      #GOC  has seen Palliative last admission  DNR/trial of NIV  no feeding tube          #VTE  on lovenox        #80 minutes

## 2024-04-29 NOTE — ED ADULT NURSE REASSESSMENT NOTE - NS ED NURSE REASSESS COMMENT FT1
Pt turned and positioned, placed in gown. Pt tolerated turning and positioning well. Pt denies SOB. pt does not appear in respiratory distress. Pt alert and oriented x3 and does not appear sleepy. Safety and comfort maintained.

## 2024-04-29 NOTE — PROGRESS NOTE ADULT - SUBJECTIVE AND OBJECTIVE BOX
63 year old female w  ALS, wheelchair bound, HLD  presents w 3 days of intermittent HA    +HA starts at top of her head from R temporo-parietal area to posterior scalp and neckposteriorly and radiates down to R neck > L  [facility] has been taking tylenol only transient improvement in symptoms. No fever, chills, recent trauma, vision changes, new numbness or weakness, urinary symptoms, cp, sob, cough,  abdominal pain.    touching the area increases pain which at time of my interview and exam is a 10/10  +mild nausea associated    In ED /80   HR 96   RR 18   T 97.9   97% sat 3 L nc  NS x 2000 cc  acetaminophen IV/ reglan 10 mg IV, methacarbamol 750, lidocaine patch/benadryl  CT HEAD: No CT evidence for acute intracranial abnormalities.  CT C-SPINE: No acute fracture or subluxation of the cervical spine.    Placed on OBS for intractable headache  states no relief from meds in ED    4/19 pt anxious , reports has whole body pain, says she wants xanax        PHYSICAL EXAM:    Daily Height in cm: 165.1 (28 Apr 2024 19:14)    Daily     ICU Vital Signs Last 24 Hrs  T(C): 36.5 (29 Apr 2024 07:45), Max: 37.1 (28 Apr 2024 22:13)  T(F): 97.7 (29 Apr 2024 07:45), Max: 98.7 (28 Apr 2024 22:13)  HR: 85 (29 Apr 2024 07:45) (85 - 99)  BP: 133/74 (29 Apr 2024 07:45) (91/67 - 134/80)  BP(mean): 96 (29 Apr 2024 03:10) (76 - 96)  ABP: --  ABP(mean): --  RR: 17 (29 Apr 2024 03:27) (14 - 22)  SpO2: 97% (29 Apr 2024 07:45) (95% - 100%)    O2 Parameters below as of 29 Apr 2024 07:45  Patient On (Oxygen Delivery Method): nasal cannula            Constitutional: NAD anxious  HEENT: Atraumatic, CURT, Normal, No congestion  Respiratory: Breath Sounds normal, no rhonchi/wheeze  Cardiovascular: N S1S2; PETROS present  Gastrointestinal: Abdomen soft, non tender, Bowel Ssounds present  Extremities: No edema, peripheral pulses present  Neurological: AAO x 3, B/L LE motor 0/5, moves upper extremitue s, R temporal R SCM tenderness to palpation  Skin: Non cellulitic, no rash, ulcers                            11.2   7.83  )-----------( 194      ( 29 Apr 2024 07:17 )             39.0       CBC Full  -  ( 29 Apr 2024 07:17 )  WBC Count : 7.83 K/uL  RBC Count : 4.28 M/uL  Hemoglobin : 11.2 g/dL  Hematocrit : 39.0 %  Platelet Count - Automated : 194 K/uL  Mean Cell Volume : 91.1 fl  Mean Cell Hemoglobin : 26.2 pg  Mean Cell Hemoglobin Concentration : 28.7 gm/dL  Auto Neutrophil # : x  Auto Lymphocyte # : x  Auto Monocyte # : x  Auto Eosinophil # : x  Auto Basophil # : x  Auto Neutrophil % : x  Auto Lymphocyte % : x  Auto Monocyte % : x  Auto Eosinophil % : x  Auto Basophil % : x      04-29    144  |  106  |  13  ----------------------------<  127<H>  3.6   |  38<H>  |  0.17<L>    Ca    9.1      29 Apr 2024 07:17    TPro  6.3  /  Alb  3.2<L>  /  TBili  0.3  /  DBili  x   /  AST  18  /  ALT  42  /  AlkPhos  54  04-28      LIVER FUNCTIONS - ( 28 Apr 2024 23:49 )  Alb: 3.2 g/dL / Pro: 6.3 gm/dL / ALK PHOS: 54 U/L / ALT: 42 U/L / AST: 18 U/L / GGT: x                       Urinalysis Basic - ( 29 Apr 2024 07:17 )    Color: x / Appearance: x / SG: x / pH: x  Gluc: 127 mg/dL / Ketone: x  / Bili: x / Urobili: x   Blood: x / Protein: x / Nitrite: x   Leuk Esterase: x / RBC: x / WBC x   Sq Epi: x / Non Sq Epi: x / Bacteria: x            MEDICATIONS  (STANDING):  amphetamine/dextroamphetamine 10 milliGRAM(s) Oral daily  atorvastatin 10 milliGRAM(s) Oral at bedtime  buPROPion XL (24-Hour) . 450 milliGRAM(s) Oral daily  enoxaparin Injectable 40 milliGRAM(s) SubCutaneous every 24 hours  FLUoxetine 10 milliGRAM(s) Oral daily  FLUoxetine 20 milliGRAM(s) Oral daily  influenza   Vaccine 0.5 milliLiter(s) IntraMuscular once  melatonin 10 milliGRAM(s) Oral at bedtime  riluzole 50 milliGRAM(s) Oral two times a day  senna 2 Tablet(s) Oral at bedtime

## 2024-04-29 NOTE — CHART NOTE - NSCHARTNOTEFT_GEN_A_CORE
Pt with ALS and chronic hypercarbic respiratory failure and uses Bipap at HS and per reported MOLST is a DNR/DNI with trial NIV. As per medical board policy regarding NIV pts with DNR/DNI trial of NIV do not need pulmonary critical care consult.

## 2024-04-29 NOTE — PATIENT PROFILE ADULT - FUNCTIONAL ASSESSMENT - BASIC MOBILITY 6.
1-calculated by average/Not able to assess (calculate score using [facility] averaging method)

## 2024-04-29 NOTE — ED ADULT NURSE REASSESSMENT NOTE - NS ED NURSE REASSESS COMMENT FT1
Pt denies headache. pt is alert and does not appear sleepy. Pt does not appear in discomfort or distress at this time. pt updated on plan of care. Safety and comfort maintained.

## 2024-04-29 NOTE — H&P ADULT - HISTORY OF PRESENT ILLNESS
63 year old female w  ALS on Radicava, wheelchair bound, HLD  presents with 3 days of intermittent HA      HA starting in her neck and radiating into the back of her head. Pt lives at [facility], has been taking tylenol only transient improvement in symptoms. No fever, chills, neck stiffness, recent trauma, vision changes, new numbness or weakness, urinary symptoms, cp, sob, cough, nvd, abdominal pain.        In ED /80   HR 96   RR 18   T 97.9   97% sat 3 L nc  NS x 2000 cc  acetaminophen IV/ julia 10 mg IV, methacarbamol 750, lidocaine patch/benadryl  CT HEAD: No CT evidence for acute intracranial abnormalities.  CT C-SPINE: No acute fracture or subluxation of the cervical spine.        PAST MEDICAL HX  ALS amyotrophic lateral sclerosis dx 2021  Chronic hypoxic respiratory failure due to restrictive lung disease from ALS   requires BIPAP at night 15/5 35% back up rate 12  Dysphagia able to tolerate regular diet (Speech and swallow saw pt )  HLD hyperlidemia      PAST SURGICAL HX        SOCIAL HX  now a resident of SNF 63 year old female w  ALS, wheelchair bound, HLD  presents w 3 days of intermittent HA    +HA starts at top of her head and radiates down to R neck > L  Now lives at [facility], has been taking tylenol only transient improvement in symptoms. No fever, chills, recent trauma, vision changes, new numbness or weakness, urinary symptoms, cp, sob, cough,  abdominal pain.    touching the area increases pain which at time of my interview and exam is a 10/10  +mild nausea associated    In ED /80   HR 96   RR 18   T 97.9   97% sat 3 L nc  NS x 2000 cc  acetaminophen IV/ reglan 10 mg IV, methacarbamol 750, lidocaine patch/benadryl  CT HEAD: No CT evidence for acute intracranial abnormalities.  CT C-SPINE: No acute fracture or subluxation of the cervical spine.    Placed on OBS for intractable headache  states no relief from meds in ED        PAST MEDICAL HX  ALS amyotrophic lateral sclerosis dx 2021  Chronic hypoxic respiratory failure due to restrictive lung disease from ALS   requires BIPAP at night 15/5 35% back up rate 12  Dysphagia able to tolerate regular diet (Speech and swallow saw pt )  HLD hyperlidemia          SOCIAL HX  now a resident of SNF 63 year old female w  ALS, wheelchair bound, HLD  presents w 3 days of intermittent HA    +HA starts at top of her head from R temporo-parietal area to posterior scalp and neckposteriorly and radiates down to R neck > L  Now lives at [facility], has been taking tylenol only transient improvement in symptoms. No fever, chills, recent trauma, vision changes, new numbness or weakness, urinary symptoms, cp, sob, cough,  abdominal pain.    touching the area increases pain which at time of my interview and exam is a 10/10  +mild nausea associated    In ED /80   HR 96   RR 18   T 97.9   97% sat 3 L nc  NS x 2000 cc  acetaminophen IV/ reglan 10 mg IV, methacarbamol 750, lidocaine patch/benadryl  CT HEAD: No CT evidence for acute intracranial abnormalities.  CT C-SPINE: No acute fracture or subluxation of the cervical spine.    Placed on OBS for intractable headache  states no relief from meds in ED        PAST MEDICAL HX  ALS amyotrophic lateral sclerosis dx 2021  Chronic hypoxic respiratory failure due to restrictive lung disease from ALS   requires BIPAP at night 15/5 35% back up rate 12  Dysphagia able to tolerate regular diet (Speech and swallow saw pt )  HLD hyperlidemia          SOCIAL HX  now a resident of SNF

## 2024-04-29 NOTE — H&P ADULT - ADDITIONAL PE
+ R parieto-temp scalp +TTP , no TTP on L  +TTP R SCM and R paracervical muscles + R tempero-parietal scalp +TTP , no TTP on L  +TTP R SCM and R paracervical muscles

## 2024-04-29 NOTE — PROGRESS NOTE ADULT - ASSESSMENT
63 year old female w  ALS, wheelchair bound, HLD  presents w 3 days of intermittent HA    #Intractable headache and   #Neck pain    +TTP over vertex to R temp-parietal scalp and R neck mm  on OBS  now started on decadron, IV depacon  and reglan per Neuro, avoid sedating meds  2. pt states no relief in ED w meds  3. Mg 1 g IV/ valium 2 mg IV by me /zofran 4 mg Istat  4. warm compress to scalp and to neck  5. NS 2000 cc in ED  6. s/p reglan/ iv tylenol/ methacarbamol 750 mg        #Recent anxiety  xanax was given 04-26 + 04-27 as 0.25 mg       #ALS  1. riluzole BID , spoke to pharmacy  2. adderall was increased from 5 to 10 at NH  3. she stopped the other agent as it was not working      #Chronic hypoxic resp failure  1. 2 L nc   2. BiPAP q HS      #Depression  1. lexapro was DCd prior  2. prozac 30 mg qd  3. Wellbutrin 450 mg qd        #Dysphagia  has been able to tolerate reg diet w thin liquids  1. diet ordered      #HLD  1. statin      #Protein calorie malnutriton  1. ensure hi protein 1 can TID      #GOC  has seen Palliative last admission  DNR/trial of NIV  no feeding tube          #VTE  on lovenox

## 2024-04-29 NOTE — ED ADULT NURSE REASSESSMENT NOTE - NS ED NURSE REASSESS COMMENT FT1
Pt A&OX3, pt denies headache and has no other complaints. pt does not appear lethargic or drowsy. Pt does not appear in discomfort or distress at this time. pt updated on plan of care. Safety and comfort maintained.

## 2024-04-29 NOTE — H&P ADULT - NSHPPHYSICALEXAM_GEN_ALL_CORE
Vital Signs Last 24 Hrs  T(C): 36.2 (29 Apr 2024 03:27), Max: 37.1 (28 Apr 2024 22:13)  T(F): 97.2 (29 Apr 2024 03:27), Max: 98.7 (28 Apr 2024 22:13)  HR: 99 (29 Apr 2024 03:27) (91 - 99)  BP: 132/74 (29 Apr 2024 03:27) (91/67 - 134/80)  BP(mean): 96 (29 Apr 2024 03:10) (76 - 96)  RR: 17 (29 Apr 2024 03:27) (14 - 22)  SpO2: 99% (29 Apr 2024 03:27) (95% - 100%)    Parameters below as of 29 Apr 2024 03:27  Patient On (Oxygen Delivery Method): BiPAP/CPAP

## 2024-04-29 NOTE — PATIENT PROFILE ADULT - TOBACCO USE
- Unclear if pt had syncopal episode but he was found awake and alert by his aid after the fall  - Monitor on tele   -CT head/c-spine no acute path  -PT recs rehab , f/u SW  - Monitor orthostatics- bp improved after aggressive fluid resuscitation on 5/29 ~3L  - TTE 5/28 normal LVEF 55-60%, increased pericardial effusion compared to prior echo, Normal pericardium with moderate sized circumferential pericardial effusion seen predominantly apical and  inferolateral to the left ventricle. No tamponade physiology.  -repeat TTE 5/29 Small pericardial effusion around the LV apex (decreased compare to echo 5/28).  No RA or RV diastolic collapse seen.  -pt was hypotensive to 80's on 5/29 w/ persistent sinus bradycardia 40's, ICU/CCU consults appreciated, dcd lopressor, responded to aggressive fluid boluses, repeat echo 5/29 no tamponade physiology, decreased small pericardial effusion.  wkend team d/w ACP and cardiology Dr. Hwang, if remains hypotensive, consider transfer to CCU for low dose dopamine gtt  -one rectal temp hypothermic 95.5F, started empirically on zosyn 5/29, Bcx 5/29 ngtd, UA neg, procalcitonin 0.1, cortisol 7.4, doubt adrenal insufficiency, tapered off hydrocortisone  -d/c zosyn after today's dose  -monitor vitals closely Never smoker

## 2024-04-29 NOTE — CONSULT NOTE ADULT - ASSESSMENT
63 year old female with PMH of ALS x 3 years (discontinued  Radicava 11/2023 because of no improvement in sx's, )follows up with Dr. Goss, wheelchair bound, chronic hypoxic resp failure requiring Bipap at night, dysphagia, chronic urinary incontinence, HLD  presents from [facility] 1 week of intermittent HA.  Pain started at the top of the head and went down to the neck.  Tylenol temporarily relieves it.  +photophobia, +nausea.  Denies dysarthria, diplopia, jaw or temporal pain.  Head CT/C-Spine CT  is negative for any acute findings.  She has been treated with migraine cocktail and methocarbamol with little improvement and is currently c/o HA radiating to neck, slight nausea, and photophobia.      #intractible headache      Recommendations  - 63 year old female with PMH of ALS x 3 years (discontinued  Radicava 11/2023 because of no improvement in sx's, )follows up with Dr. Goss, wheelchair bound, chronic hypoxic resp failure requiring Bipap at night, dysphagia, chronic urinary incontinence, HLD  presents from [facility] 1 week of intermittent HA.  Pain started at the top of the head and went down to the neck.  Tylenol temporarily relieves it.  +photophobia, +nausea.  Denies dysarthria, diplopia, jaw or temporal pain.  Head CT/C-Spine CT  is negative for any acute findings.  She has been treated with migraine cocktail and methocarbamol with little improvement and is currently c/o HA radiating to neck, slight nausea, and photophobia.      #intractable headache      Recommendations  - 63 year old female with PMH of ALS x 3 years (discontinued  Radicava 11/2023 because of no improvement in sx's, )follows up with Dr. Goss, wheelchair bound, chronic hypoxic resp failure requiring Bipap at night, dysphagia, chronic urinary incontinence, HLD  presents from [facility] 1 week of intermittent HA.  Pain started at the top of the head and went down to the neck.  Tylenol temporarily relieves it.  +photophobia, +nausea.  Denies dysarthria, diplopia, jaw or temporal pain.  Head CT/C-Spine CT  is negative for any acute findings.  She has been treated with migraine cocktail and methocarbamol with little improvement and is currently c/o HA radiating to neck, slight nausea, and photophobia.      #intractable headache with photosensitivity and nausea-possible migraine.  Little relief with diazepam, magnesium sulfate, ondansetron, metoclopramide, IV acetaminophen, Benadryl.      Recommendations  -Intractable headache with nausea and photophobia.  -Will give trial of IV Decadron, + IV Reglan + IV Depacon.  -avoid meds that may worsen CO2 retention.  -will follow    D/W Dr. Alicea, Dr. Phillips 63 year old female with PMH of ALS x 3 years (discontinued  Radicava 11/2023 because of no improvement in sx's, )follows up with Dr. Goss, wheelchair bound, chronic hypoxic resp failure requiring Bipap at night, dysphagia, chronic urinary incontinence, HLD  presents from [facility] 1 week of intermittent HA.  Pain started at the top of the head and went down to the neck.  Tylenol temporarily relieves it.  +photophobia, +nausea.  Denies dysarthria, diplopia, jaw or temporal pain.  Head CT/C-Spine CT  is negative for any acute findings.  She has been treated with migraine cocktail and methocarbamol with little improvement and is currently c/o HA radiating to neck, slight nausea, and photophobia.    #intractable headache with photosensitivity and nausea-possible migraine.  Little relief with diazepam, magnesium sulfate, ondansetron, metoclopramide, IV acetaminophen, Benadryl.      Recommendations  -Intractable headache with nausea and photophobia.  -Will give trial of IV Decadron, + IV Reglan + IV Depacon.  -avoid meds that may worsen CO2 retention.  -will follow    D/W Dr. Alicea, Dr. Phillips

## 2024-04-29 NOTE — PATIENT PROFILE ADULT - NSPRONUTRITIONRISK_GEN_A_NUR
Is the patient currently in the state of [place]? YES    Visit mode:VIDEO    If the visit is dropped, the patient can be reconnected by: VIDEO VISIT: Text to cell phone: [phone]    Will anyone else be joining the visit? NO      How would you like to obtain your AVS? MyChart    Are changes needed to the allergy or medication list? NO    Reason for visit: RECHECK          
No indicators present

## 2024-04-29 NOTE — CONSULT NOTE ADULT - SUBJECTIVE AND OBJECTIVE BOX
CC: intractible headache, ALS      HPI:  63 year old female with PMH of ALS x 3 years (discontinued  Radicava 11/2023 because of no improvement in sx's, )follows up with Dr. Goss, wheelchair bound, chronic hypoxic resp failure requiring Bipap at night, dysphagia, chronic urinary incontinence, HLD  presents from [facility] 1 week of intermittent HA.  Pain started at the top of the head and went down to the neck.  Tylenol temporarily relieves it.  +photophobia, +nausea.  Denies dysarthria, diplopia, jaw or temporal pain.  Head CT/C-Spine CT  is negative for any acute findings.  She has been treated with migraine cocktail and methocarbamol with little improvement and is currently c/o HA radiating to neck, slight nausea, and photophobia.      PAST MEDICAL HX  ALS amyotrophic lateral sclerosis dx 2021  Chronic hypoxic respiratory failure due to restrictive lung disease from ALS   requires BIPAP at night 15/5 35% back up rate 12  Dysphagia able to tolerate regular diet (Speech and swallow saw pt )  HLD hyperlidemia  constipation  urinary incontince        SOCIAL HX  now a resident of [facility]       PAST MEDICAL & SURGICAL HISTORY:  ALS (amyotrophic lateral sclerosis)      HLD (hyperlipidemia)          FAMILY HISTORY: NOn-contributory      Social Hx:  1PPD smoker x 15 yrs, quit at age of 32, no drug use. occasional ETOH    MEDICATIONS  (STANDING):  amphetamine/dextroamphetamine 10 milliGRAM(s) Oral daily  atorvastatin 10 milliGRAM(s) Oral at bedtime  buPROPion XL (24-Hour) . 450 milliGRAM(s) Oral daily  enoxaparin Injectable 40 milliGRAM(s) SubCutaneous every 24 hours  FLUoxetine 10 milliGRAM(s) Oral daily  FLUoxetine 20 milliGRAM(s) Oral daily  influenza   Vaccine 0.5 milliLiter(s) IntraMuscular once  melatonin 10 milliGRAM(s) Oral at bedtime  riluzole 50 milliGRAM(s) Oral two times a day  senna 2 Tablet(s) Oral at bedtime       Allergies    No Known Allergies    Intolerances        ROS: Pertinent positives in HPI, all other ROS were reviewed and are negative.      Vital Signs Last 24 Hrs  T(C): 36.5 (29 Apr 2024 07:45), Max: 37.1 (28 Apr 2024 22:13)  T(F): 97.7 (29 Apr 2024 07:45), Max: 98.7 (28 Apr 2024 22:13)  HR: 85 (29 Apr 2024 07:45) (85 - 99)  BP: 133/74 (29 Apr 2024 07:45) (91/67 - 134/80)  BP(mean): 96 (29 Apr 2024 03:10) (76 - 96)  RR: 17 (29 Apr 2024 03:27) (14 - 22)  SpO2: 97% (29 Apr 2024 07:45) (95% - 100%)    Parameters below as of 29 Apr 2024 07:45  Patient On (Oxygen Delivery Method): nasal cannula        General: Cooperative, NAD, lying in bed in some discomfort from headache but NAD  NECK: supple, no masses  HEAD: Normocephalic, no temporal tenderness  Extremities:  no edema  Musculoskeletal: involuntary myoclonic jerks upper extremities, asterixis  Skin: no rash    Neurological Examination:  MS: AxOx3. Appropriately interactive, eyes closed, flat affect. Speech fluent but soft, no aphasia or dysarthria   CN:  PERLL, EOMI, V1-3 sensation intact, face symmetric, hearing intact, palate elevates symmetrically, tongue midline, SCM mildly weak bilaterally  Motor: Distal wasting, Increased tone LLE, no tremor, Involuntary muscle myoclonic jerks upper extremities.  UEs prox: 4/5,distal 3/5. RLE ~ 2/5 proximally, distally 0/5, LLE plegia  Sens: Intact to light touch.    Reflexes: 0/4 LE's, trace upper extremities, mute toes b/l  Coord:  No dysmetria upper extremities  Gait: Cannot test        Labs:   04-29    144  |  106  |  13  ----------------------------<  127<H>  3.6   |  38<H>  |  0.17<L>    Ca    9.1      29 Apr 2024 07:17    TPro  6.3  /  Alb  3.2<L>  /  TBili  0.3  /  DBili  x   /  AST  18  /  ALT  42  /  AlkPhos  54  04-28                              11.2   7.83  )-----------( 194      ( 29 Apr 2024 07:17 )             39.0       Radiology:    < from: CT Head No Cont (04.28.24 @ 19:45) >  IMPRESSION:    CT HEAD: No CT evidence for acute intracranial abnormalities.    CT C-SPINE: No acute fracture or subluxation of the cervical spine.      A/P:       CC: intractable headache, ALS      HPI:  63 year old female with PMH of ALS x 3 years (discontinued  Radicava 11/2023 because of no improvement in sx's, )follows up with Dr. Goss, wheelchair bound, chronic hypoxic resp failure requiring Bipap at night, dysphagia, chronic urinary incontinence, HLD  presents from [facility] 1 week of intermittent HA.  Pain started at the top of the head and went down to the neck.  Tylenol temporarily relieves it.  +photophobia, +nausea.  Denies dysarthria, diplopia, jaw or temporal pain.  Head CT/C-Spine CT  is negative for any acute findings.  She has been treated with migraine cocktail and methocarbamol with little improvement and is currently c/o HA radiating to neck, slight nausea, and photophobia.      PAST MEDICAL HX  ALS amyotrophic lateral sclerosis dx 2021  Chronic hypoxic respiratory failure due to restrictive lung disease from ALS   requires BIPAP at night 15/5 35% back up rate 12  Dysphagia able to tolerate regular diet (Speech and swallow saw pt )  HLD hyperiodemia  constipation  urinary incontinence        SOCIAL HX  now a resident of [facility]       PAST MEDICAL & SURGICAL HISTORY:  ALS (amyotrophic lateral sclerosis)      HLD (hyperlipidemia)          FAMILY HISTORY: NOn-contributory      Social Hx:  1PPD smoker x 15 yrs, quit at age of 32, no drug use. occasional ETOH    MEDICATIONS  (STANDING):  amphetamine/dextroamphetamine 10 milliGRAM(s) Oral daily  atorvastatin 10 milliGRAM(s) Oral at bedtime  buPROPion XL (24-Hour) . 450 milliGRAM(s) Oral daily  enoxaparin Injectable 40 milliGRAM(s) SubCutaneous every 24 hours  FLUoxetine 10 milliGRAM(s) Oral daily  FLUoxetine 20 milliGRAM(s) Oral daily  influenza   Vaccine 0.5 milliLiter(s) IntraMuscular once  melatonin 10 milliGRAM(s) Oral at bedtime  riluzole 50 milliGRAM(s) Oral two times a day  senna 2 Tablet(s) Oral at bedtime       Allergies    No Known Allergies    Intolerances        ROS: Pertinent positives in HPI, all other ROS were reviewed and are negative.      Vital Signs Last 24 Hrs  T(C): 36.5 (29 Apr 2024 07:45), Max: 37.1 (28 Apr 2024 22:13)  T(F): 97.7 (29 Apr 2024 07:45), Max: 98.7 (28 Apr 2024 22:13)  HR: 85 (29 Apr 2024 07:45) (85 - 99)  BP: 133/74 (29 Apr 2024 07:45) (91/67 - 134/80)  BP(mean): 96 (29 Apr 2024 03:10) (76 - 96)  RR: 17 (29 Apr 2024 03:27) (14 - 22)  SpO2: 97% (29 Apr 2024 07:45) (95% - 100%)    Parameters below as of 29 Apr 2024 07:45  Patient On (Oxygen Delivery Method): nasal cannula        General: Cooperative, NAD, lying in bed in some discomfort from headache but NAD  NECK: supple, no masses  HEAD: Normocephalic, no temporal tenderness  Extremities:  no edema  Musculoskeletal: involuntary myoclonic jerks upper extremities, asterixis  Skin: no rash    Neurological Examination:  MS: AxOx3. Appropriately interactive, eyes closed, flat affect. Speech fluent but hypophonic, no aphasia or dysarthria   CN:  PERRL, EOMI, V1-3 sensation intact, face symmetric, hearing intact, palate elevates symmetrically, tongue midline, SCM mildly weak bilaterally  Motor: Distal wasting, Increased tone LLE, no tremor, Involuntary muscle myoclonic jerks upper extremities.  UEs prox: 4/5,distal 3/5. RLE ~ 2/5 proximally, distally 0/5, LLE plegia  Sens: Intact to light touch.    Reflexes: 0/4 LE's, trace upper extremities, mute toes b/l  Coord:  No dysmetria upper extremities  Gait: Cannot test        Labs:   04-29    144  |  106  |  13  ----------------------------<  127<H>  3.6   |  38<H>  |  0.17<L>    Ca    9.1      29 Apr 2024 07:17    TPro  6.3  /  Alb  3.2<L>  /  TBili  0.3  /  DBili  x   /  AST  18  /  ALT  42  /  AlkPhos  54  04-28                              11.2   7.83  )-----------( 194      ( 29 Apr 2024 07:17 )             39.0       Radiology:    CT Head No Cont (04.28.24 @ 19:45) >  IMPRESSION:    CT HEAD: No CT evidence for acute intracranial abnormalities.    CT C-SPINE: No acute fracture or subluxation of the cervical spine.

## 2024-04-29 NOTE — CONSULT NOTE ADULT - NS ATTEND AMEND GEN_ALL_CORE FT
Intractable headache with nausea and photophobia.  Thus far she has been treated with diazepam, magnesium sulfate, ondansetron, metoclopramide, IV acetaminophen, Benadryl.  Will give trial of IV Decadron, + IV Reglan + IV Depacon.  Will attempt to avoid meds that may worsen CO2 retention. Intractable headache with nausea and photophobia.  Thus far she has been treated with diazepam, magnesium sulfate, ondansetron, metoclopramide, IV acetaminophen, Benadryl.  Will give trial of IV Decadron 6mg, + IV Reglan 10 mg+ IV Depacon 500 mg  Will attempt to avoid meds that may worsen CO2 retention, but if not effective may have to try Fioricet or opiates.

## 2024-04-30 NOTE — CONSULT NOTE ADULT - SUBJECTIVE AND OBJECTIVE BOX
HPI: Pt is a 63y old Female with hx of ALS, wheelchair bound, HLD  presents w 3 days of intermittent HA    +HA starts at top of her head from R temporo-parietal area to posterior scalp and neckposteriorly and radiates down to R neck > L  Now lives at [facility], has been taking tylenol only transient improvement in symptoms. No fever, chills, recent trauma, vision changes, new numbness or weakness, urinary symptoms, cp, sob, cough,  abdominal pain.    touching the area increases pain which at time of my interview and exam is a 10/10  +mild nausea associated    In ED /80   HR 96   RR 18   T 97.9   97% sat 3 L nc  NS x 2000 cc  acetaminophen IV/ reglan 10 mg IV, methacarbamol 750, lidocaine patch/benadryl  CT HEAD: No CT evidence for acute intracranial abnormalities.  CT C-SPINE: No acute fracture or subluxation of the cervical spine.    Placed on OBS for intractable headache  states no relief from meds in ED    Palliative consulted for GOC.    4/30:         PAIN: ( )Yes   ( )No  Level:  Location:  Intensity:    /10  Quality:  Aggravating Factors:  Alleviating Factors:  Radiation:  Duration/Timing:  Impact on ADLs:      DYSPNEA: ( ) Yes  ( ) No  Level:    PAST MEDICAL & SURGICAL HISTORY:  ALS (amyotrophic lateral sclerosis)  HLD (hyperlipidemia)      SOCIAL HX:    Hx opiate tolerance ( )YES  ( )NO    Baseline ADLs  (Prior to Admission)  ( ) Independent   (X)Dependent      FAMILY HISTORY:      Review of Systems:    Anxiety-  Depression-  Physical Discomfort-  Dyspnea-  Constipation-  Diarrhea-  Nausea-  Vomiting-  Anorexia-  Weight Loss-   Cough-  Secretions-  Fatigue-  Weakness-  Delirium-    All other systems reviewed and negative  Unable to obtain/Limited due to:      PHYSICAL EXAM:    Vital Signs Last 24 Hrs  T(C): 36.7 (30 Apr 2024 07:17), Max: 36.7 (30 Apr 2024 07:17)  T(F): 98 (30 Apr 2024 07:17), Max: 98 (30 Apr 2024 07:17)  HR: 103 (30 Apr 2024 07:17) (92 - 109)  BP: 115/71 (30 Apr 2024 07:17) (113/69 - 125/68)  BP(mean): --  RR: 18 (30 Apr 2024 07:17) (16 - 18)  SpO2: 96% (30 Apr 2024 07:17) (96% - 99%)    Parameters below as of 30 Apr 2024 07:17  Patient On (Oxygen Delivery Method): nasal cannula      Daily     Daily     PPSV2:   %  FAST:    General:  Mental Status:  HEENT:  Lungs:  Cardiac:  GI:  :  Ext:  Neuro:      LABS:                        11.3   9.84  )-----------( 191      ( 30 Apr 2024 08:46 )             40.8     04-30    140  |  102  |  11  ----------------------------<  97  4.1   |  36<H>  |  0.17<L>    Ca    10.0      30 Apr 2024 08:46    TPro  6.3  /  Alb  3.2<L>  /  TBili  0.3  /  DBili  x   /  AST  18  /  ALT  42  /  AlkPhos  54  04-28      Albumin: Albumin: 3.2 g/dL (04-28 @ 23:49)      Allergies    No Known Allergies    Intolerances      MEDICATIONS  (STANDING):  amphetamine/dextroamphetamine 10 milliGRAM(s) Oral daily  atorvastatin 10 milliGRAM(s) Oral at bedtime  buPROPion XL (24-Hour) . 450 milliGRAM(s) Oral daily  enoxaparin Injectable 40 milliGRAM(s) SubCutaneous every 24 hours  FLUoxetine 10 milliGRAM(s) Oral daily  FLUoxetine 20 milliGRAM(s) Oral daily  influenza   Vaccine 0.5 milliLiter(s) IntraMuscular once  melatonin 10 milliGRAM(s) Oral at bedtime  riluzole 50 milliGRAM(s) Oral two times a day  senna 2 Tablet(s) Oral at bedtime    MEDICATIONS  (PRN):  acetaminophen     Tablet .. 650 milliGRAM(s) Oral every 6 hours PRN Temp greater or equal to 38C (100.4F), Mild Pain (1 - 3)  acetaminophen 325 mG/butalbital 50 mG/caffeine 40 mG 1 Tablet(s) Oral every 8 hours PRN headache  aluminum hydroxide/magnesium hydroxide/simethicone Suspension 30 milliLiter(s) Oral every 4 hours PRN Dyspepsia  ondansetron Injectable 4 milliGRAM(s) IV Push every 8 hours PRN Nausea and/or Vomiting      RADIOLOGY/ADDITIONAL STUDIES: HPI: Pt is a 63y old Female with hx of ALS, wheelchair bound, HLD  presents w 3 days of intermittent HA    +HA starts at top of her head from R temporo-parietal area to posterior scalp and neckposteriorly and radiates down to R neck > L  Now lives at [facility], has been taking tylenol only transient improvement in symptoms. No fever, chills, recent trauma, vision changes, new numbness or weakness, urinary symptoms, cp, sob, cough,  abdominal pain.    touching the area increases pain which at time of my interview and exam is a 10/10  +mild nausea associated    In ED /80   HR 96   RR 18   T 97.9   97% sat 3 L nc  NS x 2000 cc  acetaminophen IV/ reglan 10 mg IV, methacarbamol 750, lidocaine patch/benadryl  CT HEAD: No CT evidence for acute intracranial abnormalities.  CT C-SPINE: No acute fracture or subluxation of the cervical spine.    Placed on OBS for intractable headache  states no relief from meds in ED    Palliative consulted for GOC.    4/30: Seen and examined at bedside. C/o feeling very tired, also reporting constipation - no BM for 3 days, requesting suppository. Eating lunch. On 3LNC in NAD. Reports headache has resolved with Fioricet.         PAIN: ( )Yes   (X)No  denies pain      DYSPNEA: ( ) Yes  (X) No  on 3LNC in NAD  very weak cough  BIPAP nightly      PAST MEDICAL & SURGICAL HISTORY:  ALS (amyotrophic lateral sclerosis)  HLD (hyperlipidemia)      SOCIAL HX:    Hx opiate tolerance ( )YES  ( )NO    Baseline ADLs  (Prior to Admission)  ( ) Independent   (X)Dependent      FAMILY HISTORY:      Review of Systems:    Anxiety- ++  Depression- ++   Physical Discomfort- headache resolved  Dyspnea- denies  Constipation- ++ no BM in 3 days per patient  Diarrhea- denies  Nausea- denies  Vomiting- denies  Anorexia- denies  Weight Loss-   Cough- ++ weak cough  Secretions- denies  Fatigue- ++  Weakness- ++  Delirium- denies    All other systems reviewed and negative  Unable to obtain/Limited due to:      PHYSICAL EXAM:    Vital Signs Last 24 Hrs  T(C): 36.7 (30 Apr 2024 07:17), Max: 36.7 (30 Apr 2024 07:17)  T(F): 98 (30 Apr 2024 07:17), Max: 98 (30 Apr 2024 07:17)  HR: 103 (30 Apr 2024 07:17) (92 - 109)  BP: 115/71 (30 Apr 2024 07:17) (113/69 - 125/68)  BP(mean): --  RR: 18 (30 Apr 2024 07:17) (16 - 18)  SpO2: 96% (30 Apr 2024 07:17) (96% - 99%)    Parameters below as of 30 Apr 2024 07:17  Patient On (Oxygen Delivery Method): nasal cannula      Daily     Daily     PPSV2:   20%  FAST:    General: awake, pleasant, in NAD  Mental Status: A&Ox4  HEENT: hypophonic   Lungs: cta b/l; poor inspiratory effort  Cardiac: tachycardic  GI: abdomen soft, nontender, nondistended +bsx4  : no suprapubic tenderness  Ext: moving upper extremities weakly  Neuro: A&Ox4. Speech soft but intact. LE flaccid      LABS:                        11.3   9.84  )-----------( 191      ( 30 Apr 2024 08:46 )             40.8     04-30    140  |  102  |  11  ----------------------------<  97  4.1   |  36<H>  |  0.17<L>    Ca    10.0      30 Apr 2024 08:46    TPro  6.3  /  Alb  3.2<L>  /  TBili  0.3  /  DBili  x   /  AST  18  /  ALT  42  /  AlkPhos  54  04-28      Albumin: Albumin: 3.2 g/dL (04-28 @ 23:49)      Allergies    No Known Allergies    Intolerances      MEDICATIONS  (STANDING):  amphetamine/dextroamphetamine 10 milliGRAM(s) Oral daily  atorvastatin 10 milliGRAM(s) Oral at bedtime  buPROPion XL (24-Hour) . 450 milliGRAM(s) Oral daily  enoxaparin Injectable 40 milliGRAM(s) SubCutaneous every 24 hours  FLUoxetine 10 milliGRAM(s) Oral daily  FLUoxetine 20 milliGRAM(s) Oral daily  influenza   Vaccine 0.5 milliLiter(s) IntraMuscular once  melatonin 10 milliGRAM(s) Oral at bedtime  riluzole 50 milliGRAM(s) Oral two times a day  senna 2 Tablet(s) Oral at bedtime    MEDICATIONS  (PRN):  acetaminophen     Tablet .. 650 milliGRAM(s) Oral every 6 hours PRN Temp greater or equal to 38C (100.4F), Mild Pain (1 - 3)  acetaminophen 325 mG/butalbital 50 mG/caffeine 40 mG 1 Tablet(s) Oral every 8 hours PRN headache  aluminum hydroxide/magnesium hydroxide/simethicone Suspension 30 milliLiter(s) Oral every 4 hours PRN Dyspepsia  ondansetron Injectable 4 milliGRAM(s) IV Push every 8 hours PRN Nausea and/or Vomiting      RADIOLOGY/ADDITIONAL STUDIES:  < from: CT Cervical Spine No Cont (04.28.24 @ 19:45) >    ACC: [id number] EXAM:  CT CERVICAL SPINE   ORDERED BY: CHARLI HAMMOND     ACC: [id number] EXAM:  CT BRAIN   ORDERED BY: CHARLI HAMMOND     PROCEDURE DATE:  04/28/2024          INTERPRETATION:  CLINICAL INDICATION:  JCT    TECHNIQUE: Axial CT scanningof the brain was obtained from the skull   base to the vertex without the administration of intravenous contrast.   Coronal and sagittal reformatted images were subsequently obtained.   Additionally, axial images were obtained through the cervical spine using   multislice helical technique. Reformatted coronal and sagittal images   were subsequently obtained and reviewed.    COMPARISON: CT neck from 12/27/2023.    FINDINGS:    FINDINGS:    Streak artifact degraded imaging.    No CT evidence for acute territorial infarction. No intracranial   hemorrhages, midline shift or mass effect is demonstrated.   Atherosclerotic calcification of intracranial vertebral and cavernous   ICAs.    Ventricles are normal in size and configuration. The perimesencephalic   cisterns are intact. No hydrocephalus.    No abnormal accumulation in extra-axial spaces.    Partially visualized paranasal sinuses are largely clear. Mastoids are   intact. Intraorbital content is unremarkable.    Calvarium is intact.    CT C-SPINE:    Bony structures are in grossly normal anatomic alignment. There is   preservation of vertebral bodies heights and disc spaces throughout.   Craniocervical junction is intact. No acute compression deformity or   fracture of the cervical spine is identified. No joint dislocation or   perch facet are present. Posterior elements are aligned. No high-grade   central canal stenosis is recognized.    Prevertebral soft tissues unremarkable. No acute findings in the lung   apices.      IMPRESSION:    CT HEAD: No CT evidence for acute intracranial abnormalities.    CT C-SPINE: No acute fracture or subluxation of the cervical spine.    --- End of Report ---            TROY GONCALVES DO; Attending Radiologist  This document has been electronically signed. Apr 28 2024  8:30PM    < end of copied text >    < from: Xray Chest 1 View-PORTABLE IMMEDIATE (Xray Chest 1 View-PORTABLE IMMEDIATE .) (04.29.24 @ 00:05) >    ACC: [id number] EXAM:  XR CHEST PORTABLE IMMED 1V   ORDERED BY: CHARLI HAMMOND     PROCEDURE DATE:  04/29/2024          INTERPRETATION:  AP chest on April 28, 2024 11:57 PM. Concern is   hypercapnia.    Elevated diaphragms crowds the chest.    Heart magnified by technique.    Minimal left base infiltrate is unchanged.    Slight left deviation of the trachea above the clavicles consistent with   small area of right thyroid enlargement again noted.    Chest is similar to March 16 this year.    IMPRESSION: Stable chest findings as above.    --- End of Report ---            DAVID AREVALO MD; Attending Radiologist  This document has been electronically signed. Apr 29 2024  9:56AM    < end of copied text >

## 2024-04-30 NOTE — CONSULT NOTE ADULT - ASSESSMENT
Pt is a 63y old Female with hx of ALS, wheelchair bound, HLD  presents w 3 days of intermittent HA    1. Intractable headache  -with associated symptoms of photosensitivity and nausea - possible migraine-  -little relief with diazepam, magnesium sulfate, ondansetron, metoclopramide, IV acetaminophen, Benadryl, then little relief from IV decadron, IV reglan,  IV depacon per neuro notes  -Fioricet ordered  -Xanax as needed    2. ALS  -Riluzole BID  -WCB, resides at [facility]  -BIPAP nightly  -2LNC daily  -MOLST with DNR/DNI trial NIV; no feeding tube      Process of Care   --Reviewed dx/treatment problems and alignment with Goals of Care         Physical Aspects of Care   --Pain   intractable headache  neuro following  c/w current management     --Bowel Regimen   denies constipation   risk for constipation d/t immobility   daily dulcolax as needed     --Dyspnea   ALS  BIPAP nightly  2LNC daily   comfortable and in NAD     --Nausea Vomiting   denies     --Weakness   ALS  wheelchair bound        Psychological and Psychiatric Aspects of Care:    --Grief/ Bereavement: emotional support provided   --Hx of psychiatric dx: none   --Pastoral Care Available PRN        Social Aspects of Care   --SW involved            Cultural Aspects   --Primary Language: English           Goals of Care:            We discussed Palliative Care team being a supportive team when a patient has ongoing illnesses.  We also discussed that it is not an end of life care service, but can help navigate symptoms and emotional support throughout their hospital stay here.           Ethical and Legal Aspects: NA           Capacity-          HCP/Surrogate:  daughters are surrogate decision makers          Code Status: DNR/DNI trial NIV    MOLST: MOLST from 3/13/24 with limitations of DNR/DNI trial NIV; no feeding tube; ok with use of IV fluids and antiobiotics    Dispo Plan: back to [facility]          Discussed With: Dr. Hester coordinated with attending and SW and RN            Time Spent: 75 minutes including the care, coordination and counseling of this patient, 50% of which was spent coordinating and counseling.    Pt is a 63y old Female with hx of ALS, wheelchair bound, HLD  presents w 3 days of intermittent HA    1. Intractable headache  -with associated symptoms of photosensitivity and nausea - possible migraine-  -little relief with diazepam, magnesium sulfate, ondansetron, metoclopramide, IV acetaminophen, Benadryl, then little relief from IV decadron, IV reglan,  IV depacon per neuro notes  -Fioricet ordered  -Xanax as needed    2. ALS  -Riluzole BID  -WCB, resides at [facility]  -BIPAP nightly  -2LNC daily  -MOLST with DNR/DNI trial NIV; no feeding tube      Process of Care   --Reviewed dx/treatment problems and alignment with Goals of Care         Physical Aspects of Care   --Pain   intractable headache  neuro following  c/w current management     --Bowel Regimen   denies constipation   risk for constipation d/t immobility   daily dulcolax as needed     --Dyspnea   ALS  BIPAP nightly  2LNC daily   comfortable and in NAD     --Nausea Vomiting   denies     --Weakness   ALS  wheelchair bound        Psychological and Psychiatric Aspects of Care:    --Grief/ Bereavement: emotional support provided   --Hx of psychiatric dx: none   --Pastoral Care Available PRN        Social Aspects of Care   --SW involved            Cultural Aspects   --Primary Language: English           Goals of Care:  see above, for continued GOC conversations          We discussed Palliative Care team being a supportive team when a patient has ongoing illnesses.  We also discussed that it is not an end of life care service, but can help navigate symptoms and emotional support throughout their hospital stay here.           Ethical and Legal Aspects: NA           Capacity- A&Ox4 has medical decision making capacity         HCP/Surrogate:  daughters are surrogate decision makers although she believes she has HCP naming her daughter Carrie as primary agent [phone]          Code Status: DNR/DNI trial NIV    MOLST: MOLST from 3/13/24 with limitations of DNR/DNI trial NIV; no feeding tube; ok with use of IV fluids and antibiotics    Dispo Plan: back to [facility]          Discussed With: Dr. Hester coordinated with attending and SW and RN            Time Spent: 75 minutes including the care, coordination and counseling of this patient, 50% of which was spent coordinating and counseling.

## 2024-04-30 NOTE — PROGRESS NOTE ADULT - SUBJECTIVE AND OBJECTIVE BOX
pt. appears in NAD, resting comfortably when walking in to room stated her HA is slightly better but then c/o 10/10 HA, and when asked about it she said ask me tomorrow.  States cocktail yesterday gave temporary relief.  Yesterday asked multiple times for Xanax for anxiety.    ROS: As stated above otherwise neg    MEDICATIONS  (STANDING):  amphetamine/dextroamphetamine 10 milliGRAM(s) Oral daily  atorvastatin 10 milliGRAM(s) Oral at bedtime  buPROPion XL (24-Hour) . 450 milliGRAM(s) Oral daily  enoxaparin Injectable 40 milliGRAM(s) SubCutaneous every 24 hours  FLUoxetine 10 milliGRAM(s) Oral daily  FLUoxetine 20 milliGRAM(s) Oral daily  influenza   Vaccine 0.5 milliLiter(s) IntraMuscular once  melatonin 10 milliGRAM(s) Oral at bedtime  riluzole 50 milliGRAM(s) Oral two times a day  senna 2 Tablet(s) Oral at bedtime      Vital Signs Last 24 Hrs  T(C): 36.7 (30 Apr 2024 07:17), Max: 36.7 (30 Apr 2024 07:17)  T(F): 98 (30 Apr 2024 07:17), Max: 98 (30 Apr 2024 07:17)  HR: 103 (30 Apr 2024 07:17) (92 - 109)  BP: 115/71 (30 Apr 2024 07:17) (113/69 - 125/68)  BP(mean): --  RR: 18 (30 Apr 2024 07:17) (16 - 18)  SpO2: 96% (30 Apr 2024 07:17) (96% - 99%)    Parameters below as of 30 Apr 2024 07:17  Patient On (Oxygen Delivery Method): nasal cannula      General: Resting comfortably in bed, NAD  MS: AxOx3. Appropriately interactive, eyes closed, flat affect. Speech fluent but hypophonic, no aphasia or dysarthria   CN:  PERRL, EOMI, V1-3 sensation intact, face symmetric, hearing intact, palate elevates symmetrically, tongue midline, SCM mildly weak bilaterally  Motor: Distal wasting, Increased tone LLE, no tremor, Involuntary muscle myoclonic jerks upper extremities.  UEs prox: 4/5,distal 3/5. RLE ~ 2/5 proximally, distally 0/5, LLE plegia  Sens: Intact to light touch.    Reflexes: 0/4 LE's, trace upper extremities, mute toes b/l  Coord:  No dysmetria upper extremities  Gait: Cannot test                        11.2   7.83  )-----------( 194      ( 29 Apr 2024 07:17 )             39.0     04-30    140  |  102  |  11  ----------------------------<  97  4.1   |  36<H>  |  0.17<L>    Ca    10.0      30 Apr 2024 08:46    TPro  6.3  /  Alb  3.2<L>  /  TBili  0.3  /  DBili  x   /  AST  18  /  ALT  42  /  AlkPhos  54  04-28          Radiology report:  Radiology:    CT Head No Cont (04.28.24 @ 19:45) >  IMPRESSION:    CT HEAD: No CT evidence for acute intracranial abnormalities.    CT C-SPINE: No acute fracture or subluxation of the cervical spine.

## 2024-04-30 NOTE — PROGRESS NOTE ADULT - ASSESSMENT
63 year old female with PMH of ALS x 3 years (discontinued  Radicava 11/2023 because of no improvement in sx's, )follows up with Dr. Goss, wheelchair bound, chronic hypoxic resp failure requiring Bipap at night, dysphagia, chronic urinary incontinence, HLD  presents from [facility] 1 week of intermittent HA.  Pain started at the top of the head and went down to the neck.  Tylenol temporarily relieves it.  +photophobia, +nausea.  Denies dysarthria, diplopia, jaw or temporal pain.  Head CT/C-Spine CT  is negative for any acute findings.  She has been treated with migraine cocktail and methocarbamol with little improvement and is currently c/o HA radiating to neck, slight nausea, and photophobia.    #intractable headache with photosensitivity and nausea-possible migraine.  Little relief with diazepam, magnesium sulfate, ondansetron, metoclopramide, IV acetaminophen, Benadryl, then little relief from IV decadron, IV reglan,  IV depacon      Recommendations  -Will order Xanax and fioricet and see if that alleviates her sx's  -avoid further meds that may worsen CO2 retention.  -will follow    D/W Dr. Alicea, Dr. Phillips

## 2024-04-30 NOTE — PROGRESS NOTE ADULT - ASSESSMENT
63 year old female w  ALS, wheelchair bound, HLD  presents w 3 days of intermittent HA    #Intractable headache and neck pain with photosensitivity likely migraine    +TTP over vertex to R temp-parietal scalp and R neck mm  on OBS  ,  Xanax and fioricet   slight response to decadron, IV depacon  and reglan   did not respond to diazepam, magnesium sulfate, ondansetron, metoclopramide, IV acetaminophen, Benadryl,   Psych consulted      #Recent anxiety  s/p xanax 4/30   xanax was given 04-26 + 04-27 as 0.25 mg       #ALS  1. riluzole BID , spoke to pharmacy  2. adderall was increased from 5 to 10 at NH  3. she stopped the other agent as it was not working      #Chronic hypoxic resp failure  1. 2 L nc   2. BiPAP q HS      #Depression  1. lexapro was DCd prior  2. prozac 30 mg qd  3. Wellbutrin 450 mg qd        #Dysphagia  has been able to tolerate reg diet w thin liquids  1. diet ordered      #HLD  1. statin      #Protein calorie malnutriton  1. ensure hi protein 1 can TID      #GOC  consulted  Palliative as pt reported to RN she wanted to be on hospice  DNR/trial of NIV  no feeding tube  [facility]          #VTE  on lovenox

## 2024-04-30 NOTE — CONSULT NOTE ADULT - CONVERSATION DETAILS
Met with patient at bedside. Patient known to our team from prior admissions.    Patient has recently moved to [facility]. She expressed that the move has been difficult for her, and it has been "terrible" for her. She needs 24/7 care at this point. She has limited family support, has two daughters that are currently in college and are not living locally. She explained that it has been hard for her daughters to cope with their mother's terminal illness, but they are "getting by" because they have to.    When asking how patient was doing emotionally, she kept re-directing the conversation back to her children, repeating that they are coping because they have to.     She confirmed with me the MOLST that she had completed on last admission. Explaining that she would never want to be placed on a ventilator, and would not want resuscitation in the event of a cardiac arrest - knowing that it would prolong her suffering. She confirmed that she would not want a feeding tube placed either. She explained that her daughters are well aware of her wishes, and I encouraged continued open dialogue with her daughters as they would be medical decision makers in the event she is unable to for herself. She does believe she completed a HCP naming her daughter Carrie as primary agent.     No further limits in care. MOLST in chart. Palliative will continue to follow for ongoing support. Met with patient at bedside. Patient known to our team from prior admissions.    Patient has recently moved to [facility]. She expressed that the move has been difficult for her, and it has been "terrible" for her. She needs 24/7 care at this point. She has limited family support, has two daughters that are currently in college and are not living locally. She explained that it has been hard for her daughters to cope with their mother's terminal illness, but they are "getting by" because they have to.    When asking how patient was doing emotionally, she kept re-directing the conversation back to her children, repeating that they are coping because they have to.     She confirmed with me the MOLST that she had completed on last admission. Explaining that she would never want to be placed on a ventilator, and would not want resuscitation in the event of a cardiac arrest - knowing that these measures would prolong her suffering. She confirmed that she would not want a feeding tube placed either. She explained that her daughters are well aware of her wishes, and I encouraged continued open dialogue with her daughters as they would be medical decision makers in the event she is unable to for herself. She does believe she completed a HCP naming her daughter Carrie as primary agent.     No further limits in care. MOLST in chart. Palliative will continue to follow for ongoing support.

## 2024-04-30 NOTE — PROGRESS NOTE ADULT - SUBJECTIVE AND OBJECTIVE BOX
63 year old female w  ALS, wheelchair bound, HLD  presents w 3 days of intermittent HA    +HA starts at top of her head from R temporo-parietal area to posterior scalp and neckposteriorly and radiates down to R neck > L  Now lives at [facility], has been taking tylenol only transient improvement in symptoms. No fever, chills, recent trauma, vision changes, new numbness or weakness, urinary symptoms, cp, sob, cough,  abdominal pain.    touching the area increases pain which at time of my interview and exam is a 10/10  +mild nausea associated    In ED /80   HR 96   RR 18   T 97.9   97% sat 3 L nc  NS x 2000 cc  acetaminophen IV/ reglan 10 mg IV, methacarbamol 750, lidocaine patch/benadryl  CT HEAD: No CT evidence for acute intracranial abnormalities.  CT C-SPINE: No acute fracture or subluxation of the cervical spine.    Placed on OBS for intractable headache  states no relief from meds in ED    4/20 pt anxious , reports has whole body pain, says she wants xanax  PHYSICAL EXAM:Constitutional: NAD anxious  HEENT: Atraumatic, CURT, Normal, No congestion  Respiratory: Breath Sounds normal, no rhonchi/wheeze  Cardiovascular: N S1S2;  Gastrointestinal: Abdomen soft, non tender, Bowel Ssounds present  Extremities: No edema, peripheral pulses present  Neurological: AAO x 3, B/L LE motor 0/5, moves upper extremitue s, R temporal R SCM tenderness to palpation  Skin: Non cellulitic, no rash, ulcers        PHYSICAL EXAM:    Daily     Daily     ICU Vital Signs Last 24 Hrs  T(C): 36.2 (30 Apr 2024 15:06), Max: 36.7 (30 Apr 2024 07:17)  T(F): 97.2 (30 Apr 2024 15:06), Max: 98 (30 Apr 2024 07:17)  HR: 98 (30 Apr 2024 15:06) (92 - 103)  BP: 108/68 (30 Apr 2024 15:06) (108/68 - 115/71)  BP(mean): --  ABP: --  ABP(mean): --  RR: 18 (30 Apr 2024 15:06) (17 - 18)  SpO2: 98% (30 Apr 2024 15:06) (96% - 99%)    O2 Parameters below as of 30 Apr 2024 15:06  Patient On (Oxygen Delivery Method): nasal cannula  O2 Flow (L/min): 3                                  11.3   9.84  )-----------( 191      ( 30 Apr 2024 08:46 )             40.8       CBC Full  -  ( 30 Apr 2024 08:46 )  WBC Count : 9.84 K/uL  RBC Count : 4.47 M/uL  Hemoglobin : 11.3 g/dL  Hematocrit : 40.8 %  Platelet Count - Automated : 191 K/uL  Mean Cell Volume : 91.3 fl  Mean Cell Hemoglobin : 25.3 pg  Mean Cell Hemoglobin Concentration : 27.7 gm/dL  Auto Neutrophil # : 7.86 K/uL  Auto Lymphocyte # : 0.94 K/uL  Auto Monocyte # : 0.97 K/uL  Auto Eosinophil # : 0.00 K/uL  Auto Basophil # : 0.01 K/uL  Auto Neutrophil % : 79.8 %  Auto Lymphocyte % : 9.6 %  Auto Monocyte % : 9.9 %  Auto Eosinophil % : 0.0 %  Auto Basophil % : 0.1 %      04-30    140  |  102  |  11  ----------------------------<  97  4.1   |  36<H>  |  0.17<L>    Ca    10.0      30 Apr 2024 08:46    TPro  6.3  /  Alb  3.2<L>  /  TBili  0.3  /  DBili  x   /  AST  18  /  ALT  42  /  AlkPhos  54  04-28      LIVER FUNCTIONS - ( 28 Apr 2024 23:49 )  Alb: 3.2 g/dL / Pro: 6.3 gm/dL / ALK PHOS: 54 U/L / ALT: 42 U/L / AST: 18 U/L / GGT: x                       Urinalysis Basic - ( 30 Apr 2024 08:46 )    Color: x / Appearance: x / SG: x / pH: x  Gluc: 97 mg/dL / Ketone: x  / Bili: x / Urobili: x   Blood: x / Protein: x / Nitrite: x   Leuk Esterase: x / RBC: x / WBC x   Sq Epi: x / Non Sq Epi: x / Bacteria: x            MEDICATIONS  (STANDING):  amphetamine/dextroamphetamine 10 milliGRAM(s) Oral daily  atorvastatin 10 milliGRAM(s) Oral at bedtime  bisacodyl Suppository 10 milliGRAM(s) Rectal once  buPROPion XL (24-Hour) . 450 milliGRAM(s) Oral daily  enoxaparin Injectable 40 milliGRAM(s) SubCutaneous every 24 hours  FLUoxetine 10 milliGRAM(s) Oral daily  FLUoxetine 20 milliGRAM(s) Oral daily  influenza   Vaccine 0.5 milliLiter(s) IntraMuscular once  melatonin 10 milliGRAM(s) Oral at bedtime  riluzole 50 milliGRAM(s) Oral two times a day  senna 2 Tablet(s) Oral at bedtime

## 2024-05-01 NOTE — PROGRESS NOTE ADULT - ASSESSMENT
63 year old female w  ALS, wheelchair bound, HLD  presents w 3 days of intermittent HA    #Intractable headache and neck pain with photosensitivity likely migraine  MRI brain to r/o CNS cause of her HA  s/p ,Xanax and fioricet 4/30  s/p  decadron, IV depacon  and reglan without much improvement   did not respond to diazepam, magnesium sulfate, ondansetron, metoclopramide, IV acetaminophen, Benadryl,   Psych consulted    #Acute on chronic hypercarbic respiratory failure   #Acute metabolic encephalopathy due to CO2 retention  - ( on BIPAP hx at [facility])  Pt had been refusing   BIPAP hs in the hospital   resume BIPAP now will repeat ABG  daughter Carrie as primary agent [phone] pt DNR DNI trial NIV   pulm eval     #Recent anxiety  s/p xanax 4/30   xanax was given 04-26 + 04-27 as 0.25 mg       #ALS  1. riluzole BID , spoke to pharmacy  2. adderall was increased from 5 to 10 at NH  3. she stopped the other agent as it was not working      #Chronic hypoxic resp failure  1. 2 L nc   2. BiPAP q HS      #Depression  1. lexapro was DCd prior  2. prozac 30 mg qd  3. Wellbutrin 450 mg qd  psych eval appreciated - continue home med        #Dysphagia  has been able to tolerate reg diet w thin liquids        #HLD  1. statin      #Protein calorie malnutriton  1. ensure hi protein 1 can TID      #GOC  consulted  Palliative - eval appreciated   DNR/trial of NIV  no feeding tube  [facility]          #VTE  on lovenox     63 year old female w  ALS, wheelchair bound, HLD  presents w 3 days of intermittent HA    #Intractable headache and neck pain with photosensitivity likely migraine  MRI brain to r/o CNS cause of her HA  s/p ,Xanax and fioricet 4/30  s/p  decadron, IV depacon  and reglan without much improvement   did not respond to diazepam, magnesium sulfate, ondansetron, metoclopramide, IV acetaminophen, Benadryl,   Psych consulted    #Acute on chronic hypercarbic respiratory failure   #Acute metabolic encephalopathy due to CO2 retention  - ( on BIPAP hx at [facility])  Pt had been refusing   BIPAP hs in the hospital   resume BIPAP now will repeat ABG  daughter Carrie as primary agent [phone]- pt DNR DNI trial NIV   dw with Dr torres and Daughter goldie, repeat ABG improved, now slightly more awake  on bipap  pulm eval     #Recent anxiety  s/p xanax 4/30   xanax was given 04-26 + 04-27 as 0.25 mg       #ALS  1. riluzole BID , spoke to pharmacy  2. adderall was increased from 5 to 10 at NH  3. she stopped the other agent as it was not working      #Chronic hypoxic resp failure  1. 2 L nc   2. BiPAP q HS      #Depression  1. lexapro was DCd prior  2. prozac 30 mg qd  3. Wellbutrin 450 mg qd  psych eval appreciated - continue home med        #Dysphagia  has been able to tolerate reg diet w thin liquids        #HLD  1. statin      #Protein calorie malnutriton  1. ensure hi protein 1 can TID      #GOC  consulted  Palliative - eval appreciated   DNR/trial of NIV  no feeding tube  [facility]          #VTE  on lovenox

## 2024-05-01 NOTE — BH CONSULTATION LIAISON ASSESSMENT NOTE - NSBHCHARTREVIEWINVESTIGATE_PSY_A_CORE FT
Ventricular Rate 109 BPM    Atrial Rate 109 BPM    P-R Interval 146 ms    QRS Duration 94 ms    Q-T Interval 338 ms    QTC Calculation(Bazett) 455 ms    P Axis 78 degrees    R Axis 98 degrees    T Axis 65 degrees    Diagnosis Line Sinus tachycardia  Rightward axis  Borderline ECG  When compared with ECG of 29-APR-2024 12:23,  No significant change was found  Confirmed by ANAHI SMITH PAUL ([id number]) on 4/29/2024 12:58:16 PM

## 2024-05-01 NOTE — BH CONSULTATION LIAISON ASSESSMENT NOTE - NSBHCHARTREVIEWVS_PSY_A_CORE FT
Vital Signs Last 24 Hrs  T(C): 36.6 (01 May 2024 07:41), Max: 36.6 (01 May 2024 07:41)  T(F): 97.9 (01 May 2024 07:41), Max: 97.9 (01 May 2024 07:41)  HR: 96 (01 May 2024 08:15) (96 - 113)  BP: 117/67 (01 May 2024 07:41) (108/68 - 122/76)  BP(mean): --  RR: 18 (01 May 2024 07:41) (17 - 18)  SpO2: 100% (01 May 2024 07:41) (96% - 100%)    Parameters below as of 01 May 2024 07:41  Patient On (Oxygen Delivery Method): nasal cannula

## 2024-05-01 NOTE — PROGRESS NOTE ADULT - ASSESSMENT
63 year old female with PMH of ALS x 3 years (discontinued  Radicava 11/2023 because of no improvement in sx's, )follows up with Dr. Goss, wheelchair bound, chronic hypoxic resp failure requiring Bipap at night, dysphagia, chronic urinary incontinence, HLD  presents from [facility] 1 week of intermittent HA.  Pain started at the top of the head and went down to the neck.  Tylenol temporarily relieves it.  +photophobia, +nausea.  Denies dysarthria, diplopia, jaw or temporal pain.  Head CT/C-Spine CT  is negative for any acute findings.  She has been treated with migraine cocktail and methocarbamol with little improvement and is currently c/o HA radiating to neck, slight nausea, and photophobia.    #intractable headache with photosensitivity and nausea-possible migraine.  Little relief with diazepam, magnesium sulfate, ondansetron, metoclopramide, IV acetaminophen, Benadryl, then little relief from IV decadron, IV reglan,  IV depacon, started on ESGIC      Recommendations  -MRI brain to r/o CNS cause of her HA  -Continue ESGIC  -avoid further meds that may worsen CO2 retention.  -will follow    D/W Dr. Alicea, Dr. Phillips, patient

## 2024-05-01 NOTE — PROGRESS NOTE ADULT - SUBJECTIVE AND OBJECTIVE BOX
63 year old female w  ALS, wheelchair bound, HLD  presents w 3 days of intermittent HA    +HA starts at top of her head from R temporo-parietal area to posterior scalp and neckposteriorly and radiates down to R neck > L  Now lives at [facility], has been taking tylenol only transient improvement in symptoms. No fever, chills, recent trauma, vision changes, new numbness or weakness, urinary symptoms, cp, sob, cough,  abdominal pain.    touching the area increases pain which at time of my interview and exam is a 10/10  +mild nausea associated    In ED /80   HR 96   RR 18   T 97.9   97% sat 3 L nc  NS x 2000 cc  acetaminophen IV/ reglan 10 mg IV, methacarbamol 750, lidocaine patch/benadryl  CT HEAD: No CT evidence for acute intracranial abnormalities.  CT C-SPINE: No acute fracture or subluxation of the cervical spine.    admitted  for intractable headache      PHYSICAL EXAM:    Daily     Daily     ICU Vital Signs Last 24 Hrs  T(C): 36.3 (01 May 2024 15:43), Max: 36.6 (01 May 2024 07:41)  T(F): 97.4 (01 May 2024 15:43), Max: 97.9 (01 May 2024 07:41)  HR: 101 (01 May 2024 15:43) (96 - 113)  BP: 101/65 (01 May 2024 15:43) (101/65 - 122/76)  BP(mean): --  ABP: --  ABP(mean): --  RR: 18 (01 May 2024 15:43) (17 - 18)  SpO2: 92% (01 May 2024 15:43) (92% - 100%)    O2 Parameters below as of 01 May 2024 15:43  Patient On (Oxygen Delivery Method): BiPAP/CPAP  5/1 pt lethargic today, opens eyes with sternal rub, refused BIPAP last night   PHYSICAL EXAM:Constitutional: NAD anxious  HEENT: Atraumatic, CURT, Normal, No congestion  Respiratory: Breath Sounds normal, no rhonchi/wheeze  Cardiovascular: N S1S2;  Gastrointestinal: Abdomen soft, non tender, Bowel Ssounds present  Extremities: No edema, peripheral pulses present  Neurological: AAO x 3, B/L LE motor 0/5, moves upper extremitue s, R temporal R SCM tenderness to palpation  Skin: Non cellulitic, no rash, ulcers                                            11.1   7.47  )-----------( 194      ( 01 May 2024 08:41 )             40.6       CBC Full  -  ( 01 May 2024 08:41 )  WBC Count : 7.47 K/uL  RBC Count : 4.30 M/uL  Hemoglobin : 11.1 g/dL  Hematocrit : 40.6 %  Platelet Count - Automated : 194 K/uL  Mean Cell Volume : 94.4 fl  Mean Cell Hemoglobin : 25.8 pg  Mean Cell Hemoglobin Concentration : 27.3 gm/dL  Auto Neutrophil # : 6.12 K/uL  Auto Lymphocyte # : 0.74 K/uL  Auto Monocyte # : 0.54 K/uL  Auto Eosinophil # : 0.00 K/uL  Auto Basophil # : 0.01 K/uL  Auto Neutrophil % : 82.0 %  Auto Lymphocyte % : 9.9 %  Auto Monocyte % : 7.2 %  Auto Eosinophil % : 0.0 %  Auto Basophil % : 0.1 %      05-01    143  |  100  |  12  ----------------------------<  113<H>  3.7   |  42<H>  |  0.18<L>    Ca    9.7      01 May 2024 08:41                      Urinalysis Basic - ( 01 May 2024 08:41 )    Color: x / Appearance: x / SG: x / pH: x  Gluc: 113 mg/dL / Ketone: x  / Bili: x / Urobili: x   Blood: x / Protein: x / Nitrite: x   Leuk Esterase: x / RBC: x / WBC x   Sq Epi: x / Non Sq Epi: x / Bacteria: x        ABG - ( 01 May 2024 14:16 )  pH, Arterial: 7.19  pH, Blood: x     /  pCO2: 115   /  pO2: 152   / HCO3: 44    / Base Excess: 10.8  /  SaO2: 100                 MEDICATIONS  (STANDING):  amphetamine/dextroamphetamine 10 milliGRAM(s) Oral daily  atorvastatin 10 milliGRAM(s) Oral at bedtime  bisacodyl Suppository 10 milliGRAM(s) Rectal once  buPROPion XL (24-Hour) . 450 milliGRAM(s) Oral daily  enoxaparin Injectable 40 milliGRAM(s) SubCutaneous every 24 hours  FLUoxetine 10 milliGRAM(s) Oral daily  FLUoxetine 20 milliGRAM(s) Oral daily  influenza   Vaccine 0.5 milliLiter(s) IntraMuscular once  melatonin 10 milliGRAM(s) Oral at bedtime  riluzole 50 milliGRAM(s) Oral two times a day  senna 2 Tablet(s) Oral at bedtime       63 year old female w  ALS, wheelchair bound, HLD  presents w 3 days of intermittent HA    +HA starts at top of her head from R temporo-parietal area to posterior scalp and neckposteriorly and radiates down to R neck > L  Now lives at [facility], has been taking tylenol only transient improvement in symptoms. No fever, chills, recent trauma, vision changes, new numbness or weakness, urinary symptoms, cp, sob, cough,  abdominal pain.    touching the area increases pain which at time of my interview and exam is a 10/10  +mild nausea associated    In ED /80   HR 96   RR 18   T 97.9   97% sat 3 L nc  NS x 2000 cc  acetaminophen IV/ reglan 10 mg IV, methacarbamol 750, lidocaine patch/benadryl  CT HEAD: No CT evidence for acute intracranial abnormalities.  CT C-SPINE: No acute fracture or subluxation of the cervical spine.    admitted  for intractable headache  5/1- patient has been refusing  BIPAP  hs ,today lethargic , opens eyes to sternal rub      PHYSICAL EXAM:    Daily     Daily     ICU Vital Signs Last 24 Hrs  T(C): 36.3 (01 May 2024 15:43), Max: 36.6 (01 May 2024 07:41)  T(F): 97.4 (01 May 2024 15:43), Max: 97.9 (01 May 2024 07:41)  HR: 101 (01 May 2024 15:43) (96 - 113)  BP: 101/65 (01 May 2024 15:43) (101/65 - 122/76)  BP(mean): --  ABP: --  ABP(mean): --  RR: 18 (01 May 2024 15:43) (17 - 18)  SpO2: 92% (01 May 2024 15:43) (92% - 100%)    O2 Parameters below as of 01 May 2024 15:43  Patient On (Oxygen Delivery Method): BiPAP/CPAP  5/1 pt lethargic today, opens eyes with sternal rub, refused BIPAP last night   PHYSICAL EXAM:Constitutional: NAD anxious  HEENT: Atraumatic, CURT, Normal, No congestion  Respiratory: Breath Sounds normal, no rhonchi/wheeze  Cardiovascular: N S1S2;  Gastrointestinal: Abdomen soft, non tender, Bowel Ssounds present  Extremities: No edema, peripheral pulses present  Neurological: AAO x 3, B/L LE motor 0/5, moves upper extremitue s, R temporal R SCM tenderness to palpation  Skin: Non cellulitic, no rash, ulcers                                            11.1   7.47  )-----------( 194      ( 01 May 2024 08:41 )             40.6       CBC Full  -  ( 01 May 2024 08:41 )  WBC Count : 7.47 K/uL  RBC Count : 4.30 M/uL  Hemoglobin : 11.1 g/dL  Hematocrit : 40.6 %  Platelet Count - Automated : 194 K/uL  Mean Cell Volume : 94.4 fl  Mean Cell Hemoglobin : 25.8 pg  Mean Cell Hemoglobin Concentration : 27.3 gm/dL  Auto Neutrophil # : 6.12 K/uL  Auto Lymphocyte # : 0.74 K/uL  Auto Monocyte # : 0.54 K/uL  Auto Eosinophil # : 0.00 K/uL  Auto Basophil # : 0.01 K/uL  Auto Neutrophil % : 82.0 %  Auto Lymphocyte % : 9.9 %  Auto Monocyte % : 7.2 %  Auto Eosinophil % : 0.0 %  Auto Basophil % : 0.1 %      05-01    143  |  100  |  12  ----------------------------<  113<H>  3.7   |  42<H>  |  0.18<L>    Ca    9.7      01 May 2024 08:41                      Urinalysis Basic - ( 01 May 2024 08:41 )    Color: x / Appearance: x / SG: x / pH: x  Gluc: 113 mg/dL / Ketone: x  / Bili: x / Urobili: x   Blood: x / Protein: x / Nitrite: x   Leuk Esterase: x / RBC: x / WBC x   Sq Epi: x / Non Sq Epi: x / Bacteria: x        ABG - ( 01 May 2024 14:16 )  pH, Arterial: 7.19  pH, Blood: x     /  pCO2: 115   /  pO2: 152   / HCO3: 44    / Base Excess: 10.8  /  SaO2: 100                 MEDICATIONS  (STANDING):  amphetamine/dextroamphetamine 10 milliGRAM(s) Oral daily  atorvastatin 10 milliGRAM(s) Oral at bedtime  bisacodyl Suppository 10 milliGRAM(s) Rectal once  buPROPion XL (24-Hour) . 450 milliGRAM(s) Oral daily  enoxaparin Injectable 40 milliGRAM(s) SubCutaneous every 24 hours  FLUoxetine 10 milliGRAM(s) Oral daily  FLUoxetine 20 milliGRAM(s) Oral daily  influenza   Vaccine 0.5 milliLiter(s) IntraMuscular once  melatonin 10 milliGRAM(s) Oral at bedtime  riluzole 50 milliGRAM(s) Oral two times a day  senna 2 Tablet(s) Oral at bedtime

## 2024-05-01 NOTE — BH CONSULTATION LIAISON ASSESSMENT NOTE - HPI (INCLUDE ILLNESS QUALITY, SEVERITY, DURATION, TIMING, CONTEXT, MODIFYING FACTORS, ASSOCIATED SIGNS AND SYMPTOMS)
This is a 63 years old white  woman, has 1 daughter who is in college, history of depression for the last 10 to 15 years, on Wellbutrin and Prozac, gets Xanax for anxiety, has history of ALS for the last 4 or 5 years that is progressively deteriorating to the point of patient now being in wheelchair and moving to [facility] where she resides.  As per her sister who is main collateral patient expressed wish to die with diligence.    Patient is not answering any questions except saying her name.  She diverts her eyes, she looks absent and does not pay attention or acknowledges presence of this writer.  She does not answer any questions and shows no interest in communication with psychiatrist.    As per her sister patient has history depression and anxiety, was in treatment but she was never suicidal, never talked about killing herself and had attempted.  Patient has no history of psychiatric admissions, no history of agitation, aggression auditory or visual donations or paranoia.  Patient does not have history of trauma as far as his daughter sister can say but she does report divorce being very traumatic for patient.  No history of substance abuse.

## 2024-05-01 NOTE — PROGRESS NOTE ADULT - SUBJECTIVE AND OBJECTIVE BOX
Pt. lying in bed somnolent, not very interactive, says HA is the same but unable to quantify.  Started on ESGIC yesterday    ROS: unable to obtain due to lack of participation     MEDICATIONS  (STANDING):  amphetamine/dextroamphetamine 10 milliGRAM(s) Oral daily  atorvastatin 10 milliGRAM(s) Oral at bedtime  bisacodyl Suppository 10 milliGRAM(s) Rectal once  buPROPion XL (24-Hour) . 450 milliGRAM(s) Oral daily  enoxaparin Injectable 40 milliGRAM(s) SubCutaneous every 24 hours  FLUoxetine 10 milliGRAM(s) Oral daily  FLUoxetine 20 milliGRAM(s) Oral daily  influenza   Vaccine 0.5 milliLiter(s) IntraMuscular once  melatonin 10 milliGRAM(s) Oral at bedtime  riluzole 50 milliGRAM(s) Oral two times a day  senna 2 Tablet(s) Oral at bedtime      Vital Signs Last 24 Hrs  T(C): 36.6 (01 May 2024 07:41), Max: 36.6 (01 May 2024 07:41)  T(F): 97.9 (01 May 2024 07:41), Max: 97.9 (01 May 2024 07:41)  HR: 96 (01 May 2024 08:15) (96 - 113)  BP: 117/67 (01 May 2024 07:41) (108/68 - 122/76)  BP(mean): --  RR: 18 (01 May 2024 07:41) (17 - 18)  SpO2: 100% (01 May 2024 07:41) (96% - 100%)    Parameters below as of 01 May 2024 07:41  Patient On (Oxygen Delivery Method): nasal cannula    General: Resting comfortably in bed, somnolent, not very interactive, NAD  MS: Not answering questions, difficult to assess orientation, not very interactive, eyes closed, flat affect. Speech fluent but hypophonic, no aphasia or dysarthria   CN:  PERRL, EOMI, V1-3 sensation intact, face symmetric, hearing intact, limited exam  Motor: Distal wasting, Increased tone LLE, no tremor, Involuntary muscle myoclonic jerks upper extremities.  UEs prox: 4/5,distal 3/5. RLE ~ 2/5 proximally, distally 0/5, LLE plegia  Sens: Intact to light touch.    Reflexes: 0/4 LE's, trace upper extremities, mute toes b/l  Coord:  No dysmetria upper extremities  Gait: Cannot test                          11.1   7.47  )-----------( 194      ( 01 May 2024 08:41 )             40.6     05-01    143  |  100  |  12  ----------------------------<  113<H>  3.7   |  42<H>  |  0.18<L>    Ca    9.7      01 May 2024 08:41          Radiology report:    CT Head No Cont (04.28.24 @ 19:45) >  IMPRESSION:    CT HEAD: No CT evidence for acute intracranial abnormalities.    CT C-SPINE: No acute fracture or subluxation of the cervical spine.

## 2024-05-01 NOTE — BH CONSULTATION LIAISON ASSESSMENT NOTE - NSBHCHARTREVIEWLAB_PSY_A_CORE FT
11.1   7.47  )-----------( 194      ( 01 May 2024 08:41 )             40.6   05-01    143  |  100  |  12  ----------------------------<  113<H>  3.7   |  42<H>  |  0.18<L>    Ca    9.7      01 May 2024 08:41

## 2024-05-01 NOTE — BH CONSULTATION LIAISON ASSESSMENT NOTE - SUMMARY
This is a 63 years old white  woman, has 1 daughter who is in college, history of depression for the last 10 to 15 years, on Wellbutrin and Prozac, gets Xanax for anxiety, has history of ALS for the last 4 or 5 years that is progressively deteriorating to the point of patient now being in wheelchair and moving to [facility] where she resides.  As per her sister who is main collateral patient expressed wish to die with diligence.    Patient is not answering any questions except saying her name.  She diverts her eyes, she looks absent and does not pay attention or acknowledges presence of this writer.  She does not answer any questions and shows no interest in communication with psychiatrist.    As per her sister patient has history depression and anxiety, was in treatment but she was never suicidal, never talked about killing herself and had attempted.  Patient has no history of psychiatric admissions, no history of agitation, aggression auditory or visual donations or paranoia.  Patient does not have history of trauma as far as his daughter sister can say but she does report divorce being very traumatic for patient.  No history of substance abuse.    Plan :   Continue home medication  MEDICATIONS  (STANDING):  amphetamine/dextroamphetamine 10 milliGRAM(s) Oral daily  buPROPion XL (24-Hour) . 450 milliGRAM(s) Oral daily  FLUoxetine 10 milliGRAM(s) Oral daily  FLUoxetine 20 milliGRAM(s) Oral daily  melatonin 10 milliGRAM(s) Oral at bedtime

## 2024-05-01 NOTE — PROGRESS NOTE ADULT - NS ATTEND AMEND GEN_ALL_CORE FT
She appeared to be sleeping comfortably when I entered the room. Initially reported some improvement in headache and said that it was now 7/10. However, she later told PA that headache was 10/10.  I have ordered Fioricet.  Anxiety seems to be a factor as well. Xanax is ordered.    Will f/u
Patient has been drowsy, not giving much information regarding her headache.  Can continue to use PRN medications.  MRI brain with and without contrast to r/o any significant pathology.

## 2024-05-01 NOTE — BH CONSULTATION LIAISON ASSESSMENT NOTE - CURRENT MEDICATION
MEDICATIONS  (STANDING):  amphetamine/dextroamphetamine 10 milliGRAM(s) Oral daily  atorvastatin 10 milliGRAM(s) Oral at bedtime  bisacodyl Suppository 10 milliGRAM(s) Rectal once  buPROPion XL (24-Hour) . 450 milliGRAM(s) Oral daily  enoxaparin Injectable 40 milliGRAM(s) SubCutaneous every 24 hours  FLUoxetine 10 milliGRAM(s) Oral daily  FLUoxetine 20 milliGRAM(s) Oral daily  influenza   Vaccine 0.5 milliLiter(s) IntraMuscular once  melatonin 10 milliGRAM(s) Oral at bedtime  riluzole 50 milliGRAM(s) Oral two times a day  senna 2 Tablet(s) Oral at bedtime    MEDICATIONS  (PRN):  acetaminophen     Tablet .. 650 milliGRAM(s) Oral every 6 hours PRN Temp greater or equal to 38C (100.4F), Mild Pain (1 - 3)  acetaminophen 325 mG/butalbital 50 mG/caffeine 40 mG 1 Tablet(s) Oral every 8 hours PRN headache  aluminum hydroxide/magnesium hydroxide/simethicone Suspension 30 milliLiter(s) Oral every 4 hours PRN Dyspepsia  bisacodyl Suppository 10 milliGRAM(s) Rectal daily PRN Constipation  ondansetron Injectable 4 milliGRAM(s) IV Push every 8 hours PRN Nausea and/or Vomiting

## 2024-05-01 NOTE — BH CONSULTATION LIAISON ASSESSMENT NOTE - NSBHMSEIMPULSE_PSY_A_CORE
Writer left message stating that this was a friendly reminder to have her labs done prior to her appointment next week with Dr. Bridgette Quintero. Normal

## 2024-05-01 NOTE — BH CONSULTATION LIAISON ASSESSMENT NOTE - RISK ASSESSMENT
Full suicide risk cannot be determined.  Patient is not communicative.  As per sister patient expressed wish to die with dignity because of her ALS and pain and suffering.  But she did not express thoughts about killing herself.

## 2024-05-02 NOTE — PROGRESS NOTE ADULT - SUBJECTIVE AND OBJECTIVE BOX
HPI: Pt is a 63y old Female with hx of ALS, wheelchair bound, HLD  presents w 3 days of intermittent HA    +HA starts at top of her head from R temporo-parietal area to posterior scalp and neckposteriorly and radiates down to R neck > L  Now lives at [facility], has been taking tylenol only transient improvement in symptoms. No fever, chills, recent trauma, vision changes, new numbness or weakness, urinary symptoms, cp, sob, cough,  abdominal pain.    touching the area increases pain which at time of my interview and exam is a 10/10  +mild nausea associated    In ED /80   HR 96   RR 18   T 97.9   97% sat 3 L nc  NS x 2000 cc  acetaminophen IV/ reglan 10 mg IV, methacarbamol 750, lidocaine patch/benadryl  CT HEAD: No CT evidence for acute intracranial abnormalities.  CT C-SPINE: No acute fracture or subluxation of the cervical spine.    Placed on OBS for intractable headache  states no relief from meds in ED    Palliative consulted for GOC.    4/30: Seen and examined at bedside. C/o feeling very tired, also reporting constipation - no BM for 3 days, requesting suppository. Eating lunch. On 3LNC in NAD. Reports headache has resolved with Fioricet.   5/2: More lethargic today. c/o neck pain - neuro following she went for MRI brain this AM. Patient's friend at bedside. Spoke with patient's daughter as well as sister-in-law.      CODE STATUS: DNR/DNI trial NIV      Pain and Dyspnea:     ++neck pain  feels sore, stiff  Tylenol 650 mg every 6 hours PRN  Esgic 1 tablet every 8 hours PRN headache    no dyspnea, on 5LNC  weak, shallow respirations  very weak cough  BIPAP nightly  has been refusing BIPAP at night- CO2 severely elevated yesterday was placed on BIPAP  DNR/DNI trial NIV      Review of Systems:    Anxiety- ++  Depression- ++   Physical Discomfort- ++ neck pain  Dyspnea- denies  Constipation-   Diarrhea-   Nausea-   Vomiting-   Anorexia-   Weight Loss-   Cough- ++ weak cough  Secretions- denies  Fatigue- ++  Weakness- ++  Delirium- denies    All other systems reviewed and negative  Unable to obtain/Limited due to: lethargic        PHYSICAL EXAM:    Vital Signs Last 24 Hrs  T(C): 36.8 (02 May 2024 17:16), Max: 37.2 (01 May 2024 23:33)  T(F): 98.2 (02 May 2024 17:16), Max: 98.9 (01 May 2024 23:33)  HR: 110 (02 May 2024 17:16) (94 - 110)  BP: 143/95 (02 May 2024 17:16) (129/79 - 143/95)  BP(mean): --  RR: 18 (02 May 2024 17:16) (18 - 18)  SpO2: 97% (02 May 2024 17:16) (94% - 100%)    Parameters below as of 02 May 2024 17:16  Patient On (Oxygen Delivery Method): room air      Daily     Daily     PPSV2:  20 %  FAST:      General: lethargic, in mild distress  HEENT: hypophonic   Lungs: cta b/l; poor inspiratory effort  Cardiac: tachycardic  GI: abdomen soft, nontender, nondistended +bsx4  : no suprapubic tenderness  Ext: moving upper extremities weakly  Neuro: A&Ox3. Speech soft but intact. LE flaccid        LABS and RADIOLOGY: REVIEWED

## 2024-05-02 NOTE — CONSULT NOTE ADULT - SUBJECTIVE AND OBJECTIVE BOX
Patient is a 63y old  Female who presents with a chief complaint of headache and neck pain (01 May 2024 16:08)      HPI:  63 year old female w  ALS, wheelchair bound, HLD  presents w 3 days of intermittent HA    +HA starts at top of her head from R temporo-parietal area to posterior scalp and neckposteriorly and radiates down to R neck > L  Now lives at [facility], has been taking tylenol only transient improvement in symptoms. No fever, chills, recent trauma, vision changes, new numbness or weakness, urinary symptoms, cp, sob, cough,  abdominal pain.    touching the area increases pain which at time of my interview and exam is a 10/10  +mild nausea associated    In ED /80   HR 96   RR 18   T 97.9   97% sat 3 L nc  NS x 2000 cc  acetaminophen IV/ reglan 10 mg IV, methacarbamol 750, lidocaine patch/benadryl  CT HEAD: No CT evidence for acute intracranial abnormalities.  CT C-SPINE: No acute fracture or subluxation of the cervical spine.    Placed on OBS for intractable headache  states no relief from meds in ED        PAST MEDICAL HX  ALS amyotrophic lateral sclerosis dx 2021  Chronic hypoxic respiratory failure due to restrictive lung disease from ALS   requires BIPAP at night 15/5 35% back up rate 12  Dysphagia able to tolerate regular diet (Speech and swallow saw pt )  HLD hyperlidemia          SOCIAL HX  now a resident of [facility] (29 Apr 2024 03:04)      PAST MEDICAL & SURGICAL HISTORY:  ALS (amyotrophic lateral sclerosis)      HLD (hyperlipidemia)          PREVIOUS DIAGNOSTIC TESTING:      MEDICATIONS  (STANDING):  amphetamine/dextroamphetamine 10 milliGRAM(s) Oral daily  atorvastatin 10 milliGRAM(s) Oral at bedtime  bisacodyl Suppository 10 milliGRAM(s) Rectal once  buPROPion XL (24-Hour) . 450 milliGRAM(s) Oral daily  enoxaparin Injectable 40 milliGRAM(s) SubCutaneous every 24 hours  FLUoxetine 10 milliGRAM(s) Oral daily  FLUoxetine 20 milliGRAM(s) Oral daily  influenza   Vaccine 0.5 milliLiter(s) IntraMuscular once  melatonin 10 milliGRAM(s) Oral at bedtime  riluzole 50 milliGRAM(s) Oral two times a day  senna 2 Tablet(s) Oral at bedtime    MEDICATIONS  (PRN):  acetaminophen     Tablet .. 650 milliGRAM(s) Oral every 6 hours PRN Temp greater or equal to 38C (100.4F), Mild Pain (1 - 3)  acetaminophen 325 mG/butalbital 50 mG/caffeine 40 mG 1 Tablet(s) Oral every 8 hours PRN headache  aluminum hydroxide/magnesium hydroxide/simethicone Suspension 30 milliLiter(s) Oral every 4 hours PRN Dyspepsia  bisacodyl Suppository 10 milliGRAM(s) Rectal daily PRN Constipation  ondansetron Injectable 4 milliGRAM(s) IV Push every 8 hours PRN Nausea and/or Vomiting      FAMILY HISTORY:      SOCIAL HISTORY:  ***    REVIEW OF SYSTEM:  Pertinent items are noted in HPI.      Vital Signs Last 24 Hrs  T(C): 36.5 (02 May 2024 07:28), Max: 37.2 (01 May 2024 23:33)  T(F): 97.7 (02 May 2024 07:28), Max: 98.9 (01 May 2024 23:33)  HR: 107 (02 May 2024 07:28) (94 - 107)  BP: 133/85 (02 May 2024 07:28) (101/65 - 133/85)  BP(mean): --  RR: 18 (02 May 2024 07:28) (18 - 18)  SpO2: 100% (02 May 2024 07:28) (92% - 100%)    Parameters below as of 02 May 2024 07:28  Patient On (Oxygen Delivery Method): BiPAP/CPAP        I&O's Summary    PHYSICAL EXAM  General Appearance:seen while on BIPAP- well coordinated  Neck: Supple,   Back: Symmetric, no  tenderness,no soft tissue tenderness  Lungs: Clear to auscultation bilateral,no adventitious breath sounds, normal   expiratory phase  Heart: Regular rate and rhythm, S1, S2 normal  Abdomen: Soft, non-tender, bowel sounds active  Extremities: no cyanosis or edema, no joint swelling  Skin: Skin color, texture normal, no rashes   Neurologic: limited exam    ECG:    LABS:                          11.1   7.47  )-----------( 194      ( 01 May 2024 08:41 )             40.6     05-01    143  |  100  |  12  ----------------------------<  113<H>  3.7   |  42<H>  |  0.18<L>    Ca    9.7      01 May 2024 08:41                Urinalysis Basic - ( 01 May 2024 08:41 )    Color: x / Appearance: x / SG: x / pH: x  Gluc: 113 mg/dL / Ketone: x  / Bili: x / Urobili: x   Blood: x / Protein: x / Nitrite: x   Leuk Esterase: x / RBC: x / WBC x   Sq Epi: x / Non Sq Epi: x / Bacteria: x      ABG - ( 01 May 2024 17:04 )  pH, Arterial: 7.37  pH, Blood: x     /  pCO2: 78    /  pO2: 83    / HCO3: 45    / Base Excess: 15.8  /  SaO2: 99.0                  RADIOLOGY & ADDITIONAL STUDIES:    < from: Xray Chest 1 View-PORTABLE IMMEDIATE (Xray Chest 1 View-PORTABLE IMMEDIATE .) (04.29.24 @ 00:05) >    INTERPRETATION:  AP chest on April 28, 2024 11:57 PM. Concern is   hypercapnia.    Elevated diaphragms crowds the chest.    Heart magnified by technique.    Minimal left base infiltrate is unchanged.    Slight left deviation of the trachea above the clavicles consistent with   small area of right thyroid enlargement again noted.    Chest is similar to March 16 this year.    IMPRESSION: Stable chest findings as above.    < end of copied text >  < from: CT Head No Cont (04.28.24 @ 19:45) >      IMPRESSION:    CT HEAD: No CT evidence for acute intracranial abnormalities.    CT C-SPINE: No acute fracture or subluxation of the cervical spine.    < end of copied text >  < from: Xray Chest 1 View- PORTABLE-Urgent (03.12.24 @ 23:22) >  Impression:    Mild atelectasis at the bases    No fracture.    < end of copied text >

## 2024-05-02 NOTE — PROGRESS NOTE ADULT - SUBJECTIVE AND OBJECTIVE BOX
HPI: 63 year old female w  ALS, wheelchair bound, HLD  presents w 3 days of intermittent HA, HA starts at top of her head from R temporo-parietal area to posterior scalp and neck posteriorly and radiates down to R neck > L. Now lives at [facility], has been taking tylenol only transient improvement in symptoms. Touching the area increases pain which at time of my interview and exam is a 10/10 +mild nausea associated    5/2: pt seen this AM, awake, alert, follows some commands when asked, now speaking some words, said oh my god and no, pt noted retching, when asked if she was nauseous she nodded yes.     ROS: unable to obtain       MEDICATIONS  (STANDING):  acetaminophen   IVPB .. 1000 milliGRAM(s) IV Intermittent once  amphetamine/dextroamphetamine 10 milliGRAM(s) Oral daily  atorvastatin 10 milliGRAM(s) Oral at bedtime  bisacodyl Suppository 10 milliGRAM(s) Rectal once  buPROPion XL (24-Hour) . 450 milliGRAM(s) Oral daily  enoxaparin Injectable 40 milliGRAM(s) SubCutaneous every 24 hours  FLUoxetine 10 milliGRAM(s) Oral daily  FLUoxetine 20 milliGRAM(s) Oral daily  influenza   Vaccine 0.5 milliLiter(s) IntraMuscular once  melatonin 10 milliGRAM(s) Oral at bedtime  potassium phosphate / sodium phosphate Powder (PHOS-NaK) 1 Packet(s) Oral three times a day before meals  riluzole 50 milliGRAM(s) Oral two times a day  senna 2 Tablet(s) Oral at bedtime    MEDICATIONS  (PRN):  acetaminophen     Tablet .. 650 milliGRAM(s) Oral every 6 hours PRN Temp greater or equal to 38C (100.4F), Mild Pain (1 - 3)  acetaminophen 325 mG/butalbital 50 mG/caffeine 40 mG 1 Tablet(s) Oral every 8 hours PRN headache  aluminum hydroxide/magnesium hydroxide/simethicone Suspension 30 milliLiter(s) Oral every 4 hours PRN Dyspepsia  bisacodyl Suppository 10 milliGRAM(s) Rectal daily PRN Constipation  ondansetron Injectable 4 milliGRAM(s) IV Push every 8 hours PRN Nausea and/or Vomiting      Vital Signs Last 24 Hrs  T(C): 36.8 (02 May 2024 17:16), Max: 37.2 (01 May 2024 23:33)  T(F): 98.2 (02 May 2024 17:16), Max: 98.9 (01 May 2024 23:33)  HR: 110 (02 May 2024 17:16) (94 - 110)  BP: 143/95 (02 May 2024 17:16) (129/79 - 143/95)  RR: 18 (02 May 2024 17:16) (18 - 18)  SpO2: 97% (02 May 2024 17:16) (94% - 100%)    Parameters below as of 02 May 2024 17:16  Patient On (Oxygen Delivery Method): room air      PHYSICAL EXAM:  GENERAL: NAD, lying in bed comfortably  HEAD:  Atraumatic, Normocephalic  EYES: conjunctiva and sclera clear  ENT: Moist mucous membranes  NECK: Supple, No JVD  CHEST/LUNG: decreased breath sounds bilaterally; No rales, rhonchi, wheezing. Unlabored respirations  HEART: Regular rate and rhythm; No murmurs  ABDOMEN: Bowel sounds present; Soft, Nontender, Nondistended.   EXTREMITIES:  2+ Peripheral Pulses, brisk capillary refill. No clubbing, cyanosis, or edema  NERVOUS SYSTEM:  Alert, awake, speech clear. No deficits   MSK: FROM to b/l UE, weakness to b/l LE          LABS:                          11.1   7.47  )-----------( 194      ( 01 May 2024 08:41 )             40.6     02 May 2024 16:41    141    |  102    |  13     ----------------------------<  117    4.4     |  36     |  <0.15    Ca    9.1        02 May 2024 16:41  Phos  2.2       02 May 2024 16:41  Mg     1.8       02 May 2024 16:41    TPro  6.8    /  Alb  3.5    /  TBili  0.5    /  DBili  0.1    /  AST  14     /  ALT  36     /  AlkPhos  53     02 May 2024 10:33    LIVER FUNCTIONS - ( 02 May 2024 10:33 )  Alb: 3.5 g/dL / Pro: 6.8 gm/dL / ALK PHOS: 53 U/L / ALT: 36 U/L / AST: 14 U/L / GGT: x             CAPILLARY BLOOD GLUCOSE            Urinalysis Basic - ( 02 May 2024 16:41 )    Color: x / Appearance: x / SG: x / pH: x  Gluc: 117 mg/dL / Ketone: x  / Bili: x / Urobili: x   Blood: x / Protein: x / Nitrite: x   Leuk Esterase: x / RBC: x / WBC x   Sq Epi: x / Non Sq Epi: x / Bacteria: x        RADIOLOGY:      < from: CT Head No Cont (04.28.24 @ 19:45) >  FINDINGS:    FINDINGS:    Streak artifact degraded imaging.    No CT evidence for acute territorial infarction. No intracranial   hemorrhages, midline shift or mass effect is demonstrated.   Atherosclerotic calcification of intracranial vertebral and cavernous   ICAs.    Ventricles are normal in size and configuration. The perimesencephalic   cisterns are intact. No hydrocephalus.    No abnormal accumulation in extra-axial spaces.    Partially visualized paranasal sinuses are largely clear. Mastoids are   intact. Intraorbital content is unremarkable.    Calvarium is intact.    CT C-SPINE:    Bony structures are in grossly normal anatomic alignment. There is   preservation of vertebral bodies heights and disc spaces throughout.   Craniocervical junction is intact. No acute compression deformity or   fracture of the cervical spine is identified. No joint dislocation or   perch facet are present. Posterior elements are aligned. No high-grade   central canal stenosis is recognized.    Prevertebral soft tissues unremarkable. No acute findings in the lung   apices.      IMPRESSION:    CT HEAD: No CT evidence for acute intracranial abnormalities.    CT C-SPINE: No acute fracture or subluxation of the cervical spine.    < end of copied text >  < from: MR Head No Cont Disc (05.02.24 @ 09:01) >  IMPRESSION:    Limited by motion and patient's inability to complete the examination.    No hydrocephalus, mass effect, acute intracranial hemorrhage, vasogenic   edema, restricted diffusion, or acute territorial infarct.    No focal parenchymal signal abnormality.

## 2024-05-02 NOTE — PROGRESS NOTE ADULT - ASSESSMENT
Ms. Higgins is a 63 year old female with PMH of ALS x 3 years (discontinued  Radicava 11/2023 because of no improvement in sx's, )follows up with Dr. Goss, wheelchair bound, chronic hypoxic resp failure requiring Bipap at night, dysphagia, chronic urinary incontinence, HLD  presents from [facility] 1 week of intermittent HA.  Pain started at the top of the head and went down to the neck.  Tylenol temporarily relieves it.  +photophobia, +nausea.  Denies dysarthria, diplopia, jaw or temporal pain.  Head CT/C-Spine CT  is negative for any acute findings.  She has been treated with migraine cocktail and methocarbamol with little improvement and is currently c/o HA radiating to neck, slight nausea, and photophobia.  She seems to be becoming more encephalopathic, possibly due to CO2 retention.     #intractable headache with photosensitivity and nausea-possible migraine.   -She was treated with diazepam, magnesium sulfate, ondansetron, metoclopramide, IV acetaminophen, Benadry with little relief  -She was also treated with Decadron, IV reglan, IV Depacon with unclear response and eventually started on ESCIG.  -Limited MRI brain (aborted due to patient movement).  -She has CO2 retention which may become worse with opiates. Can continue to try IV Tylenol, Toradol.    -Spoke with patient's friend who inquired about a palliative care consult to help with pain. Will discuss with hospitalist.     Discussed with Dr. Taylor

## 2024-05-02 NOTE — PROVIDER CONTACT NOTE (CRITICAL VALUE NOTIFICATION) - ACTION/TREATMENT ORDERED:
Awaiting orders
Continue BiPap throughout the night and remove at 7 am as per MD Phillips
Pt on BiPap and do repeat ABG

## 2024-05-02 NOTE — CHART NOTE - NSCHARTNOTEFT_GEN_A_CORE
HPI:  63 year old female w  ALS, wheelchair bound, HLD  presents w 3 days of intermittent HA  +HA starts at top of her head from R temporo-parietal area to posterior scalp and neckposteriorly and radiates down to R neck > L  Now lives at [facility], has been taking tylenol only transient improvement in symptoms. No fever, chills, recent trauma, vision changes, new numbness or weakness, urinary symptoms, cp, sob, cough,  abdominal pain.  touching the area increases pain which at time of my interview and exam is a 10/10  +mild nausea associated  (29 Apr 2024 03:04)      PERTINENT PMH REVIEWED:  [ x ] YES [ ] NO           Primary Contact:       Christine, #[phone]    HCP [  ] Surrogate [   ] Guardian [   ]    Mental Status: [ ] Alert  [  ] Oriented [  ] Confused [ x ] Lethargic  Concerns of Depression [  ] -unable to assess  Anxiety [   ] -unable to assess  Baseline ADLs (prior to admission):  Independent [ ] moderately [ ] fully   Dependent   [ ] moderately [ x ]fully    Family Meeting attendees: GOC 4/30    Anticipated Grief: Patient[ x ] Family [ x ]    Caregiver [place] Assessed: Yes [ x ] No [  ]    Anabaptist: decline    Spiritual Concerns: Not identified,  available for support.    Goals of Care: Current medical management     Previous Services: [facility]     ADVANCE DIRECTIVES:    -daughters are surrogate decision makers although she believes she has HCP naming her daughter Carrie as primary agent [phone]  -TERESA from 3/13/24 with limitations of DNR/DNI trial NIV; no feeding tube; ok with use of IV fluids and antibiotics    Anticipated D/C Plan: likely return to [facility]                     Summary:  This SW attempted to meet with Pt numerous times on 5/1 whom was sleeping.  Pt. known to our team from a previous admission.  This writer attempted to meet with Pt. again this morning, Pt. was moaning and unable to participate in conversation at this time.  Will continue to follow up to offer support.  GOC held 4/30 with Palliative NP, Alannah Ambrose.  TERESA from 3/13/24 with limitations of DNR/DNI trial NIV; no feeding tube; ok with use of IV fluids and antibiotics.  Pt reported to Palliative NP that her daughters are surrogate decision makers although she believes she has HCP naming her daughter Carrie as primary agent [phone].  Will observe for changes and remain available.  Our team to continue to follow.

## 2024-05-02 NOTE — PROGRESS NOTE ADULT - ASSESSMENT
63 year old female w  ALS, wheelchair bound, HLD  presents w 3 days of intermittent HA    #Intractable headache and neck pain with photosensitivity likely migraine  MRI brain as above   s/p Xanax and fioricet 4/30  s/p  decadron, IV depacon  and reglan without much improvement   did not respond to diazepam, magnesium sulfate, ondansetron, metoclopramide, IV acetaminophen, Benadryl,   Psych consulted  Neurology consult appreciated, d/w Dr. Alicea today     #Acute on chronic hypercarbic/hypoxic respiratory failure  Pt had been refusing BIPAP hs in the hospital, now tolerating  CO2 improving   pulm consult appreciated     #Acute metabolic encephalopathy due to CO2 retention  - ( on BIPAP hx at [facility])  Likely 2/2 CO2 retention  TSH, ammonia normal   Improving today     #Hypokalemia/Hypophosphatemia  Likely 2/2 poor po intake, pt has been encephalopathic, poor po intake       #Anxiety  s/p xanax 4/30   xanax was given 04-26 + 04-27 as 0.25 mg     #ALS  riluzole BID  adderall was increased from 5 to 10 at NH  she stopped the other agent as it was not working    #Depression  lexapro was DCd prior  prozac 30 mg qd  Wellbutrin 450 mg qd  psych eval appreciated - continue home med    #Dysphagia  has been able to tolerate reg diet w thin liquids    #HLD  continue statin      #Protein calorie malnutriton  ensure hi protein 1 can TID    #GOC  consulted  Palliative - eval appreciated   DNR/trial of NIV  no feeding tube  [facility]    #VTE  on lovenox    daughter Carrie as primary agent [phone]- pt DNR DNI trial NIV

## 2024-05-02 NOTE — PROGRESS NOTE ADULT - SUBJECTIVE AND OBJECTIVE BOX
Interval History:  5/2/24: Patient appears uncomfortable. Not verbalizing complaints. Moaning  Went for MRI brain but was unable to complete study    MEDICATIONS  (STANDING):  amphetamine/dextroamphetamine 10 milliGRAM(s) Oral daily  atorvastatin 10 milliGRAM(s) Oral at bedtime  bisacodyl Suppository 10 milliGRAM(s) Rectal once  buPROPion XL (24-Hour) . 450 milliGRAM(s) Oral daily  enoxaparin Injectable 40 milliGRAM(s) SubCutaneous every 24 hours  FLUoxetine 10 milliGRAM(s) Oral daily  FLUoxetine 20 milliGRAM(s) Oral daily  influenza   Vaccine 0.5 milliLiter(s) IntraMuscular once  melatonin 10 milliGRAM(s) Oral at bedtime  riluzole 50 milliGRAM(s) Oral two times a day  senna 2 Tablet(s) Oral at bedtime    MEDICATIONS  (PRN):  acetaminophen     Tablet .. 650 milliGRAM(s) Oral every 6 hours PRN Temp greater or equal to 38C (100.4F), Mild Pain (1 - 3)  acetaminophen 325 mG/butalbital 50 mG/caffeine 40 mG 1 Tablet(s) Oral every 8 hours PRN headache  aluminum hydroxide/magnesium hydroxide/simethicone Suspension 30 milliLiter(s) Oral every 4 hours PRN Dyspepsia  bisacodyl Suppository 10 milliGRAM(s) Rectal daily PRN Constipation  ondansetron Injectable 4 milliGRAM(s) IV Push every 8 hours PRN Nausea and/or Vomiting      Allergies    No Known Allergies    Intolerances        PHYSICAL EXAM:  Vital Signs Last 24 Hrs  T(F): 97.7 (05-02-24 @ 07:28)  HR: 107 (05-02-24 @ 07:28)  BP: 133/85 (05-02-24 @ 07:28)  RR: 18 (05-02-24 @ 07:28)    GENERAL: moaning  HEAD:  Atraumatic, Normocephalic  Neuro:  Opens eyes to voice.   Not consistently following commands. Not answering questions or verbalizing, moans  CN: PERRL, EOMI, no facial asymmetry noted  motor: moving upper extremities well including against gravity.  moving lower extremities in bed, not against gravity  DTRs: symmetric  sensory: withdraws        LABS:                        11.1   7.47  )-----------( 194      ( 01 May 2024 08:41 )             40.6     05-01    143  |  100  |  12  ----------------------------<  113<H>  3.7   |  42<H>  |  0.18<L>    Ca    9.7      01 May 2024 08:41        Urinalysis Basic - ( 01 May 2024 08:41 )    Color: x / Appearance: x / SG: x / pH: x  Gluc: 113 mg/dL / Ketone: x  / Bili: x / Urobili: x   Blood: x / Protein: x / Nitrite: x   Leuk Esterase: x / RBC: x / WBC x   Sq Epi: x / Non Sq Epi: x / Bacteria: x        RADIOLOGY & ADDITIONAL STUDIES:  CT head and cervical spine 4/28/24:  CT HEAD: No CT evidence for acute intracranial abnormalities.    CT C-SPINE: No acute fracture or subluxation of the cervical spine.      MRI head 5/2/24:  Limited by motion and patient's inability to complete the examination.    No hydrocephalus, mass effect, acute intracranial hemorrhage, vasogenic   edema, restricted diffusion, or acute territorial infarct.    No focal parenchymal signal abnormality.

## 2024-05-02 NOTE — PROGRESS NOTE ADULT - ASSESSMENT
Pt is a 63y old Female with hx of ALS, wheelchair bound, HLD  presents w 3 days of intermittent HA    1. Intractable headache  -with associated symptoms of photosensitivity and nausea - possible migraine-  -little relief with diazepam, magnesium sulfate, ondansetron, metoclopramide, IV acetaminophen, Benadryl, then little relief from IV decadron, IV reglan,  IV depacon per neuro notes  -Tylenol PRN  -Esgic PRN as ordered by neurology  -avoiding opioids as it will cause further CO2 retention  -neuro following  -MRI brain done today although limited study    2. ALS  -Riluzole BID  -WCB, resides at [facility]  -BIPAP nightly - has been refusing  -CO2 retention - required BIPAP yesterday  -on 5LNC  -MOLST with DNR/DNI trial NIV; no feeding tube      Process of Care   --Reviewed dx/treatment problems and alignment with Goals of Care         Physical Aspects of Care   --Pain   intractable headache  neuro following  c/w current management     --Bowel Regimen   denies constipation   risk for constipation d/t immobility   daily dulcolax as needed     --Dyspnea   ALS  BIPAP nightly - has been refusing   on 5LNC  DNR/DNI trial NIV    --Nausea Vomiting   denies     --Weakness   ALS  wheelchair bound        Psychological and Psychiatric Aspects of Care:    --Grief/ Bereavement: emotional support provided   --Hx of psychiatric dx: none   --Pastoral Care Available PRN        Social Aspects of Care   --SW involved            Cultural Aspects   --Primary Language: English           Goals of Care:  see above, for continued GOC conversations          We discussed Palliative Care team being a supportive team when a patient has ongoing illnesses.  We also discussed that it is not an end of life care service, but can help navigate symptoms and emotional support throughout their hospital stay here.           Ethical and Legal Aspects: NA           Capacity- A&Ox3 has medical decision making capacity         HCP/Surrogate:  sister-in-law Christine Lui primary point of contact [phone]          Code Status: DNR/DNI trial NIV    MOLST: MOLST from 3/13/24 with limitations of DNR/DNI trial NIV; no feeding tube; ok with use of IV fluids and antibiotics    Dispo Plan: TBD          Discussed With: Dr. Hester coordinated with attending and SW and RN            Time Spent: 45 minutes including the care, coordination and counseling of this patient, 50% of which was spent coordinating and counseling.

## 2024-05-02 NOTE — CONSULT NOTE ADULT - ASSESSMENT
63 year old female w  ALS, wheelchair bound, HLD  presents w 3 days of intermittent HA    +HA starts at top of her head from R temporo-parietal area to posterior scalp and neckposteriorly and radiates down to R neck > L  Now lives at [facility], has been taking tylenol only transient improvement in symptoms. No fever, chills, recent trauma, vision changes, new numbness or weakness, urinary symptoms, cp, sob, cough,  abdominal pain.    touching the area increases pain which at time of my interview and exam is a 10/10  +mild nausea associated    In ED /80   HR 96   RR 18   T 97.9   97% sat 3 L nc  NS x 2000 cc  acetaminophen IV/ reglan 10 mg IV, methacarbamol 750, lidocaine patch/benadryl  CT HEAD: No CT evidence for acute intracranial abnormalities.  CT C-SPINE: No acute fracture or subluxation of the cervical spine.    Placed on OBS for intractable headache  states no relief from meds in ED    ABG - ( 01 May 2024 17:04 )  pH, Arterial: 7.37  pH, Blood: x     /  pCO2: 78    /  pO2: 83    / HCO3: 45    / Base Excess: 15.8  /  SaO2: 99.0        Assessment / plan;  Acute on chronic hypercapnic resp failure  neurologic Dx ALS with Rest pulm dz causing xs  overall deterioration in status as presenting sxs  bibasilar atelectasis no definite consolidations  covering for Dr Arciniega  overall prognosis remains guarded  agree with palliative acre consult  trial NIV appropriate and DNR / DNI wishes respected  Dr Arciniega will resume in am

## 2024-05-03 NOTE — PHYSICAL THERAPY INITIAL EVALUATION ADULT - MANUAL MUSCLE TESTING RESULTS, REHAB EVAL
RLE 3/5 range proximally and 1-2/5 distally ;LLE 0-1/5 ( trace hip ADD/ABD/EXT elicited) BUEs 3+ to 4-/5 with + fasciculations noted R >L shoulders ,intrinsic atrophy B hands

## 2024-05-03 NOTE — PHYSICAL THERAPY INITIAL EVALUATION ADULT - PATIENT/FAMILY/SIGNIFICANT OTHER GOALS STATEMENT, PT EVAL
when asked about foot drop splints being used at SNF ,pt informs me she ordered her own L footdrop support on [employer]

## 2024-05-03 NOTE — PHYSICAL THERAPY INITIAL EVALUATION ADULT - MUSCLE TONE ASSESSMENT, REHAB EVAL
LLE Is flaccid, RLE mod-severely reduced , midly decreased BUEs/bilateral upper extremities/bilateral lower extremities/mildly decreased tone/moderately decreased tone/severely decreased tone

## 2024-05-03 NOTE — PHYSICAL THERAPY INITIAL EVALUATION ADULT - IMPAIRMENTS FOUND, PT EVAL
aerobic capacity/endurance/arousal, attention, and cognition/gait, locomotion, and balance/muscle strength/tone/ventilation and respiration/gas exchange

## 2024-05-03 NOTE — PROGRESS NOTE ADULT - NS PRO AD PATIENT TYPE ON CHART
Medical Orders for Life-Sustaining Treatment (MOLST)
Health Care Proxy (HCP)/Medical Orders for Life-Sustaining Treatment (MOLST)

## 2024-05-03 NOTE — PROGRESS NOTE ADULT - SUBJECTIVE AND OBJECTIVE BOX
Interval History:  5/3/24: More alert today. States that headache is rated 5/10. Also has nausea.    MEDICATIONS  (STANDING):  acetaminophen   IVPB .. 1000 milliGRAM(s) IV Intermittent once  amphetamine/dextroamphetamine 10 milliGRAM(s) Oral daily  atorvastatin 10 milliGRAM(s) Oral at bedtime  bisacodyl Suppository 10 milliGRAM(s) Rectal once  buPROPion XL (24-Hour) . 450 milliGRAM(s) Oral daily  dextrose 5% + sodium chloride 0.45%. 1000 milliLiter(s) (55 mL/Hr) IV Continuous <Continuous>  enoxaparin Injectable 40 milliGRAM(s) SubCutaneous every 24 hours  FLUoxetine 10 milliGRAM(s) Oral daily  FLUoxetine 20 milliGRAM(s) Oral daily  influenza   Vaccine 0.5 milliLiter(s) IntraMuscular once  melatonin 10 milliGRAM(s) Oral at bedtime  potassium phosphate / sodium phosphate Powder (PHOS-NaK) 1 Packet(s) Oral three times a day before meals  riluzole 50 milliGRAM(s) Oral two times a day  senna 2 Tablet(s) Oral at bedtime    MEDICATIONS  (PRN):  acetaminophen     Tablet .. 650 milliGRAM(s) Oral every 6 hours PRN Temp greater or equal to 38C (100.4F), Mild Pain (1 - 3)  acetaminophen 325 mG/butalbital 50 mG/caffeine 40 mG 1 Tablet(s) Oral every 8 hours PRN headache  aluminum hydroxide/magnesium hydroxide/simethicone Suspension 30 milliLiter(s) Oral every 4 hours PRN Dyspepsia  bisacodyl Suppository 10 milliGRAM(s) Rectal daily PRN Constipation  ondansetron Injectable 4 milliGRAM(s) IV Push every 8 hours PRN Nausea and/or Vomiting      Allergies    No Known Allergies    Intolerances        PHYSICAL EXAM:  Vital Signs Last 24 Hrs  T(F): 98.6 (05-03-24 @ 07:32)  HR: 98 (05-03-24 @ 07:32)  BP: 143/91 (05-03-24 @ 07:32)  RR: 18 (05-03-24 @ 07:32)    GENERAL: On O2.   HEAD:  Atraumatic, Normocephalic  Neuro:  CN:  PERRL, EOMI, V1-3 sensation intact, face symmetric, hearing intact, limited exam  Motor: Distal wasting, Increased tone LLE, no tremor, Involuntary muscle myoclonic jerks upper extremities.  UEs prox: 4/5,distal 3/5. RLE ~ 2/5 proximally, distally 0/5, LLE plegia  Sens: Intact to light touch.    Coord:  No involuntary movements  Gait: not tested    LABS:                        11.0   7.30  )-----------( 134      ( 03 May 2024 07:40 )             36.8     05-03    138  |  101  |  14  ----------------------------<  116<H>  4.7   |  34<H>  |  <0.15<L>    Ca    9.1      03 May 2024 07:40  Phos  1.2     05-03  Mg     2.1     05-03    TPro  6.8  /  Alb  3.5  /  TBili  0.5  /  DBili  0.1  /  AST  14<L>  /  ALT  36  /  AlkPhos  53  05-02      Urinalysis Basic - ( 03 May 2024 07:40 )    Color: x / Appearance: x / SG: x / pH: x  Gluc: 116 mg/dL / Ketone: x  / Bili: x / Urobili: x   Blood: x / Protein: x / Nitrite: x   Leuk Esterase: x / RBC: x / WBC x   Sq Epi: x / Non Sq Epi: x / Bacteria: x        RADIOLOGY & ADDITIONAL STUDIES:  CT head and cervical spine 4/28/24:  CT HEAD: No CT evidence for acute intracranial abnormalities.    CT C-SPINE: No acute fracture or subluxation of the cervical spine.      MRI head 5/2/24:  Limited by motion and patient's inability to complete the examination.    No hydrocephalus, mass effect, acute intracranial hemorrhage, vasogenic   edema, restricted diffusion, or acute territorial infarct.    No focal parenchymal signal abnormality.

## 2024-05-03 NOTE — PROGRESS NOTE ADULT - ASSESSMENT
Ms. Higgins is a 63 year old female with PMH of ALS x 3 years (discontinued  Radicava 11/2023 because of no improvement in sx's, )follows up with Dr. Goss, wheelchair bound, chronic hypoxic resp failure requiring Bipap at night, dysphagia, chronic urinary incontinence, HLD  presents from [facility] 1 week of intermittent HA.  Pain started at the top of the head and went down to the neck.  Tylenol temporarily relieves it.  +photophobia, +nausea.  Denies dysarthria, diplopia, jaw or temporal pain.  Head CT/C-Spine CT  is negative for any acute findings.  She has been treated with migraine cocktail and methocarbamol with little improvement and is currently c/o HA radiating to neck, slight nausea, and photophobia.  She seems to be becoming more encephalopathic, possibly due to CO2 retention.     #intractable headache with photosensitivity and nausea  -Initially did not seem to be responding to multiple medications. Now headache is somewhat improved, rated 5/10.  -Can continue to use IV Tylenol or NSAIDs as needed for headache. Will attempt to avoid meds that may contribute to CO2 retention.    # Encephalopathy  -Now much improved  -Was likely secondary to CO2 retention  -Continue BPAP when sleeping for now. If plans change after further discussion with palliative care will reevaluate    # ALS  -Patient follows at [facility] clinic via telehealth  -She does not want life prolonging measures. Focus on comfort. Palliative care involved.    Dr. Anderson will cover neurology 5/4, 5/5. Please call back if additional input is needed from neurology service.       Discussed with Dr. Taylor

## 2024-05-03 NOTE — PHYSICAL THERAPY INITIAL EVALUATION ADULT - ACTIVE RANGE OF MOTION EXAMINATION, REHAB EVAL
B foot drop/brooke. upper extremity Active ROM was WNL (within normal limits)/Right LE Active ROM was WFL (within functional limits)/deficits as listed below

## 2024-05-03 NOTE — PROGRESS NOTE ADULT - SUBJECTIVE AND OBJECTIVE BOX
HPI: Pt is a 63y old Female with hx of ALS, wheelchair bound, HLD  presents w 3 days of intermittent HA    +HA starts at top of her head from R temporo-parietal area to posterior scalp and neckposteriorly and radiates down to R neck > L  Now lives at [facility], has been taking tylenol only transient improvement in symptoms. No fever, chills, recent trauma, vision changes, new numbness or weakness, urinary symptoms, cp, sob, cough,  abdominal pain.    touching the area increases pain which at time of my interview and exam is a 10/10  +mild nausea associated    In ED /80   HR 96   RR 18   T 97.9   97% sat 3 L nc  NS x 2000 cc  acetaminophen IV/ reglan 10 mg IV, methacarbamol 750, lidocaine patch/benadryl  CT HEAD: No CT evidence for acute intracranial abnormalities.  CT C-SPINE: No acute fracture or subluxation of the cervical spine.    Placed on OBS for intractable headache  states no relief from meds in ED    Palliative consulted for GOC.    4/30: Seen and examined at bedside. C/o feeling very tired, also reporting constipation - no BM for 3 days, requesting suppository. Eating lunch. On 3LNC in NAD. Reports headache has resolved with Fioricet.   5/2: More lethargic today. c/o neck pain - neuro following she went for MRI brain this AM. Patient's friend at bedside. Spoke with patient's daughter as well as sister-in-law.  5/3: Very alert today. c/o neck pain, worse when she turns her head side to side, but pain improved since prior days. Stated to me that she is "ready for hospice". GOC with patient and Christine, see below.        CODE STATUS: DNR/DNI trial NIV      Pain and Dyspnea:     c/o neck pain  5/10  aching, sore  worse when she turns her head side to side  will order Lidocaine patch  ice packs to back of neck as patient reports some relief  IV Toradol 30 mg x1   will order Ibuprofen 400 mg every 8 hours PRN moderate pain    no dyspnea, on 4LNC in NAD  tolerated BIPAP overnight      Review of Systems:    Anxiety- denies currently  Depression- ++   Physical Discomfort- ++ neck pain  Dyspnea- denies  Constipation- denies  Diarrhea- denies  Nausea- denies  Vomiting- denies  Anorexia- ++  Weight Loss-   Cough- ++ weak cough  Secretions- denies  Fatigue- ++  Weakness- ++  Delirium- denies    All other systems reviewed and negative  Unable to obtain/Limited due to: lethargic        PHYSICAL EXAM:    Vital Signs Last 24 Hrs  T(C): 36.4 (03 May 2024 15:55), Max: 37 (03 May 2024 07:32)  T(F): 97.5 (03 May 2024 15:55), Max: 98.6 (03 May 2024 07:32)  HR: 101 (03 May 2024 15:55) (95 - 110)  BP: 135/78 (03 May 2024 15:55) (135/78 - 143/95)  BP(mean): --  RR: 17 (03 May 2024 15:55) (17 - 18)  SpO2: 100% (03 May 2024 15:55) (97% - 100%)    Parameters below as of 03 May 2024 15:55  Patient On (Oxygen Delivery Method): nasal cannula      Daily     Daily     PPSV2:  20 %  FAST:    General: alert, pleasant in NAD  HEENT: hypophonic   Lungs: cta b/l; poor inspiratory effort  Cardiac: tachycardic  GI: abdomen soft, nontender, nondistended +bsx4  : no suprapubic tenderness  Ext: moving upper extremities weakly  Neuro: A&Ox3. Speech soft but intact. LE flaccid        LABS and RADIOLOGY: REVIEWED

## 2024-05-03 NOTE — PHYSICAL THERAPY INITIAL EVALUATION ADULT - MARITAL STATUS
pt has 2 daughters/ pt has 2 daughters, sister involved and a friend Lesley (who is mother of her eldest daughters boyfriend)/

## 2024-05-03 NOTE — PROGRESS NOTE ADULT - TREATMENT GUIDELINE COMMENT
DNR/DNI, trial NIV, send to the hospital only if pain can not be controlled, comfort measures, no feeding tube, no dialysis, determine use of IV fluids and determine use of antibiotics

## 2024-05-03 NOTE — PHYSICAL THERAPY INITIAL EVALUATION ADULT - PATIENT PROFILE REVIEW, REHAB EVAL
pt diagnosed with ALS 2021 and has been residing at [facility] since ?December 2023/yes

## 2024-05-03 NOTE — PHYSICAL THERAPY INITIAL EVALUATION ADULT - GENERAL OBSERVATIONS, REHAB EVAL
Recumbent in bed with O2 via NC 3L/min, Purewick external urinary catheter draining cloudy/muky urine , lethargic but arousable, denies severe neck pain at present with LIDODERM patch in place to posterior neck ,rated 3/10 bilaterally

## 2024-05-03 NOTE — PHYSICAL THERAPY INITIAL EVALUATION ADULT - FOLLOWS COMMANDS/ANSWERS QUESTIONS, REHAB EVAL
speech is very soft /hypophonic and often difficult to hear ,needed to have pt repeat herself frequently/100% of the time/able to follow single-step instructions

## 2024-05-03 NOTE — PHYSICAL THERAPY INITIAL EVALUATION ADULT - PERTINENT HX OF CURRENT PROBLEM, REHAB EVAL
63 year old female w  ALS, wheelchair bound, HLD  presents w 3 days of intermittent HA    +HA starts at top of her head from R temporo-parietal area to posterior scalp and neck posteriorly and radiates down to R neck > L  Now lives at [facility], has been taking Tylenol only transient improvement in symptoms. No fever, chills, recent trauma, vision changes, new numbness or weakness, urinary symptoms, cp, sob, cough,  abdominal pain.    touching the area increases pain which at time of my interview and exam is a 10/10  +mild nausea associated

## 2024-05-03 NOTE — PROGRESS NOTE ADULT - ASSESSMENT
63 year old female w  ALS, wheelchair bound, HLD  presents w 3 days of intermittent HA    #Intractable headache and neck pain with photosensitivity likely migraine  MRI brain as above   s/p Xanax and fioricet 4/30  s/p  decadron, IV depacon  and reglan without much improvement   did not respond to diazepam, magnesium sulfate, ondansetron, metoclopramide, IV acetaminophen, Benadryl,   Psych consulted  Neurology consult appreciated, d/w Dr. Alicea today     #Acute on chronic hypercarbic/hypoxic respiratory failure  Pt had been refusing BIPAP hs in the hospital, now tolerating  CO2 improving   pulm consult appreciated     #Acute metabolic encephalopathy due to CO2 retention  - ( on BIPAP hx at [facility])  Likely 2/2 CO2 retention  TSH, ammonia normal   Continues to improve today     #Hypokalemia/Hypophosphatemia  Likely 2/2 poor po intake, pt has been encephalopathic, poor po intake    trend and replete PRN     #Anxiety  s/p xanax 4/30   xanax was given 04-26 + 04-27 as 0.25 mg     #ALS  riluzole BID  adderall was increased from 5 to 10 at NH  she stopped the other agent as it was not working    #Depression  lexapro was DCd prior  prozac 30 mg qd  Wellbutrin 450 mg qd  psych eval appreciated - continue home med    #Dysphagia  has been able to tolerate reg diet w thin liquids    #HLD  continue statin      #Protein calorie malnutriton  ensure hi protein 1 can TID    #GOC  consulted  Palliative - eval appreciated   DNR/trial of NIV  no feeding tube  [facility]    #VTE  on lovenox    daughter Carrie as primary agent [phone]- pt DNR DNI trial NIV     Palliative care following     DISPO: once stable, lex d/c ELODIA on comfort MOLST

## 2024-05-03 NOTE — PHYSICAL THERAPY INITIAL EVALUATION ADULT - DIAGNOSIS, PT EVAL
AMS ,lethargy, ?CO2 retention,Acute on chronic hypoxic respiratory failure , ALS, debility intractable HA/cervicalgia; +AMS ,lethargy, ?CO2 retention, Acute on chronic hypoxic respiratory failure , ALS, debility

## 2024-05-03 NOTE — PHYSICAL THERAPY INITIAL EVALUATION ADULT - NSACTIVITYREC_GEN_A_PT
T&P [id number] , offload bony prominences , support neck using small pillow or cervical roll ,position upright during meals with arms propped B to promote ease in self-feeding

## 2024-05-03 NOTE — PROGRESS NOTE ADULT - ASSESSMENT
Pt is a 63y old Female with hx of ALS, wheelchair bound, HLD  presents w 3 days of intermittent HA    1. Intractable headache  -with associated symptoms of photosensitivity and nausea - possible migraine-  -little relief with diazepam, magnesium sulfate, ondansetron, metoclopramide, IV acetaminophen, Benadryl, then little relief from IV decadron, IV reglan,  IV depacon per neuro notes  -Tylenol PRN  -Esgic PRN as ordered by neurology  -avoiding opioids as it will cause further CO2 retention  -neuro following  -MRI brain done 5/2 although limited study  -now c/o neck pain - aching, sore - increased pain when she turns her head side to side  -will order Lidocaine patch to site  -IV Toradol 30 mg x 1  -Motrin 400 mg every 8 hours PRN moderate pain  -ice packs to back of neck as patient finds relief with cool    2. ALS  -Riluzole BID  -WCB, resides at [facility]  -BIPAP nightly - has been refusing  -CO2 retention - required BIPAP yesterday  -on 5LNC  -GOC today MOLST updated to reflect patient wishes of DNR/DNI, trial NIV, send to the hospital only if pain can not be controlled, comfort measures, no feeding tube, no dialysis, determine use of IV fluids and determine use of antibiotics      Process of Care   --Reviewed dx/treatment problems and alignment with Goals of Care         Physical Aspects of Care   --Pain   intractable headache  neuro following  c/w current management     --Bowel Regimen   denies constipation   risk for constipation d/t immobility   daily dulcolax as needed     --Dyspnea   ALS  BIPAP nightly   on 5LNC  DNR/DNI trial NIV    --Nausea Vomiting   denies     --Weakness   ALS  wheelchair bound        Psychological and Psychiatric Aspects of Care:    --Grief/ Bereavement: emotional support provided   --Hx of psychiatric dx: none   --Pastoral Care Available PRN        Social Aspects of Care   --SW involved            Cultural Aspects   --Primary Language: English           Goals of Care:  see above           We discussed Palliative Care team being a supportive team when a patient has ongoing illnesses.  We also discussed that it is not an end of life care service, but can help navigate symptoms and emotional support throughout their hospital stay here.           Ethical and Legal Aspects: NA           Capacity- A&Ox3 has medical decision making capacity         HCP/Surrogate:  sister-in-law Christine Mckeon [phone]          Code Status: DNR/DNI trial NIV    MOLST: MOLST updated 5/3/24 with limitations of DNR/DNI, trial NIV, send to the hospital only if pain can not be controlled, comfort measures, no feeding tube, no dialysis, determine use of IV fluids and determine use of antibiotics    Dispo Plan: SNF with comfort MOLST          Discussed With: Dr. Dinh & Dr. Taylor          Case coordinated with attending and SW and RN            Time Spent: 60 minutes including the care, coordination and counseling of this patient, 50% of which was spent coordinating and counseling.

## 2024-05-03 NOTE — PROGRESS NOTE ADULT - SUBJECTIVE AND OBJECTIVE BOX
HPI: 63 year old female w  ALS, wheelchair bound, HLD  presents w 3 days of intermittent HA, HA starts at top of her head from R temporo-parietal area to posterior scalp and neck posteriorly and radiates down to R neck > L. Now lives at [facility], has been taking tylenol only transient improvement in symptoms. Touching the area increases pain which at time of my interview and exam is a 10/10 +mild nausea associated    5/2: pt seen this AM, awake, alert, follows some commands when asked, now speaking some words, said oh my god and no, pt noted retching, when asked if she was nauseous she nodded yes.   5/3: pt seen today, awake, alert, oriented x 3, able to have conversation, tolerating orally, still with 5/10 HA and occasional nausea.     REVIEW OF SYSTEMS:    CONSTITUTIONAL: No weakness, fevers or chills  EYES/ENT: No visual changes;  No vertigo or throat pain   NECK: No pain or stiffness  RESPIRATORY: No cough, wheezing, hemoptysis; No shortness of breath  CARDIOVASCULAR: No chest pain or palpitations  GASTROINTESTINAL: No abdominal or epigastric pain. No nausea, vomiting, or hematemesis; No diarrhea or constipation. No melena or hematochezia.  GENITOURINARY: No dysuria, frequency or hematuria  NEUROLOGICAL: No numbness or weakness, +HA  SKIN: No itching, rashes        MEDICATIONS  (STANDING):  acetaminophen   IVPB .. 1000 milliGRAM(s) IV Intermittent once  amphetamine/dextroamphetamine 10 milliGRAM(s) Oral daily  atorvastatin 10 milliGRAM(s) Oral at bedtime  bisacodyl Suppository 10 milliGRAM(s) Rectal once  buPROPion XL (24-Hour) . 450 milliGRAM(s) Oral daily  enoxaparin Injectable 40 milliGRAM(s) SubCutaneous every 24 hours  FLUoxetine 10 milliGRAM(s) Oral daily  FLUoxetine 20 milliGRAM(s) Oral daily  influenza   Vaccine 0.5 milliLiter(s) IntraMuscular once  melatonin 10 milliGRAM(s) Oral at bedtime  potassium phosphate / sodium phosphate Powder (PHOS-NaK) 1 Packet(s) Oral three times a day before meals  riluzole 50 milliGRAM(s) Oral two times a day  senna 2 Tablet(s) Oral at bedtime    MEDICATIONS  (PRN):  acetaminophen     Tablet .. 650 milliGRAM(s) Oral every 6 hours PRN Temp greater or equal to 38C (100.4F), Mild Pain (1 - 3)  acetaminophen 325 mG/butalbital 50 mG/caffeine 40 mG 1 Tablet(s) Oral every 8 hours PRN headache  aluminum hydroxide/magnesium hydroxide/simethicone Suspension 30 milliLiter(s) Oral every 4 hours PRN Dyspepsia  bisacodyl Suppository 10 milliGRAM(s) Rectal daily PRN Constipation  ondansetron Injectable 4 milliGRAM(s) IV Push every 8 hours PRN Nausea and/or Vomiting      Vital Signs Last 24 Hrs  T(C): 36.4 (03 May 2024 15:55), Max: 37 (03 May 2024 07:32)  T(F): 97.5 (03 May 2024 15:55), Max: 98.6 (03 May 2024 07:32)  HR: 101 (03 May 2024 15:55) (95 - 102)  BP: 135/78 (03 May 2024 15:55) (135/78 - 143/91)  RR: 17 (03 May 2024 15:55) (17 - 18)  SpO2: 100% (03 May 2024 15:55) (98% - 100%)    Parameters below as of 03 May 2024 15:55  Patient On (Oxygen Delivery Method): nasal cannula      PYSICAL EXAM:  GENERAL: NAD, lying in bed comfortably  HEAD:  Atraumatic, Normocephalic  EYES: conjunctiva and sclera clear  ENT: Moist mucous membranes  NECK: Supple, No JVD  CHEST/LUNG: decreased breath sounds bilaterally; No rales, rhonchi, wheezing. Unlabored respirations  HEART: Regular rate and rhythm; No murmurs  ABDOMEN: Bowel sounds present; Soft, Nontender, Nondistended.   EXTREMITIES:  2+ Peripheral Pulses, brisk capillary refill. No clubbing, cyanosis, or edema  NERVOUS SYSTEM:  Alert, awake, speech clear. No deficits   MSK: FROM to b/l UE, weakness to b/l LE          LABS:                           11.0   7.30  )-----------( 134      ( 03 May 2024 07:40 )             36.8   05-03    138  |  101  |  14  ----------------------------<  116<H>  4.7   |  34<H>  |  <0.15<L>    Ca    9.1      03 May 2024 07:40  Phos  1.2     05-03  Mg     2.1     05-03    TPro  6.8  /  Alb  3.5  /  TBili  0.5  /  DBili  0.1  /  AST  14<L>  /  ALT  36  /  AlkPhos  53  05-02            Urinalysis Basic - ( 02 May 2024 16:41 )    Color: x / Appearance: x / SG: x / pH: x  Gluc: 117 mg/dL / Ketone: x  / Bili: x / Urobili: x   Blood: x / Protein: x / Nitrite: x   Leuk Esterase: x / RBC: x / WBC x   Sq Epi: x / Non Sq Epi: x / Bacteria: x        RADIOLOGY:      < from: CT Head No Cont (04.28.24 @ 19:45) >  FINDINGS:    FINDINGS:    Streak artifact degraded imaging.    No CT evidence for acute territorial infarction. No intracranial   hemorrhages, midline shift or mass effect is demonstrated.   Atherosclerotic calcification of intracranial vertebral and cavernous   ICAs.    Ventricles are normal in size and configuration. The perimesencephalic   cisterns are intact. No hydrocephalus.    No abnormal accumulation in extra-axial spaces.    Partially visualized paranasal sinuses are largely clear. Mastoids are   intact. Intraorbital content is unremarkable.    Calvarium is intact.    CT C-SPINE:    Bony structures are in grossly normal anatomic alignment. There is   preservation of vertebral bodies heights and disc spaces throughout.   Craniocervical junction is intact. No acute compression deformity or   fracture of the cervical spine is identified. No joint dislocation or   perch facet are present. Posterior elements are aligned. No high-grade   central canal stenosis is recognized.    Prevertebral soft tissues unremarkable. No acute findings in the lung   apices.      IMPRESSION:    CT HEAD: No CT evidence for acute intracranial abnormalities.    CT C-SPINE: No acute fracture or subluxation of the cervical spine.      < from: MR Head No Cont Disc (05.02.24 @ 09:01) >  IMPRESSION:    Limited by motion and patient's inability to complete the examination.    No hydrocephalus, mass effect, acute intracranial hemorrhage, vasogenic   edema, restricted diffusion, or acute territorial infarct.    No focal parenchymal signal abnormality.

## 2024-05-03 NOTE — PHYSICAL THERAPY INITIAL EVALUATION ADULT - PRECAUTIONS/LIMITATIONS, REHAB EVAL
on O2 3L/min presently but needs BIPAP at times/aspiration precautions/fall precautions/oxygen therapy device and L/min

## 2024-05-04 NOTE — PROGRESS NOTE ADULT - ASSESSMENT
63 year old female w  ALS, wheelchair bound, HLD  presents w 3 days of intermittent HA    +HA starts at top of her head from R temporo-parietal area to posterior scalp and neckposteriorly and radiates down to R neck > L  Now lives at [facility], has been taking tylenol only transient improvement in symptoms. No fever, chills, recent trauma, vision changes, new numbness or weakness, urinary symptoms, cp, sob, cough,  abdominal pain.    touching the area increases pain which at time of my interview and exam is a 10/10  +mild nausea associated    In ED /80   HR 96   RR 18   T 97.9   97% sat 3 L nc  NS x 2000 cc  acetaminophen IV/ reglan 10 mg IV, methacarbamol 750, lidocaine patch/benadryl  CT HEAD: No CT evidence for acute intracranial abnormalities.  CT C-SPINE: No acute fracture or subluxation of the cervical spine.    Placed on OBS for intractable headache  states no relief from meds in ED    ABG - ( 01 May 2024 17:04 )  pH, Arterial: 7.37  pH, Blood: x     /  pCO2: 78    /  pO2: 83    / HCO3: 45    / Base Excess: 15.8  /  SaO2: 99.0        Assessment / plan;  Acute on chronic hypercapnic resp failure  neurologic Dx ALS with Rest pulm dz causing xs  overall deterioration in status as presenting sxs  bibasilar atelectasis no definite consolidations  covering for Dr Arciniega  overall prognosis remains guarded  agree with palliative acre consult  trial NIV appropriate and DNR / DNI wishes respected  DC planning as per primary team / hospitalist  Dr Arciniega will resume 5/6

## 2024-05-04 NOTE — PROGRESS NOTE ADULT - SUBJECTIVE AND OBJECTIVE BOX
CC:  headache and neck pain      · Subjective and Objective:   HPI: 63 year old female w  ALS, wheelchair bound, HLD  presents w 3 days of intermittent HA, HA starts at top of her head from R temporo-parietal area to posterior scalp and neck posteriorly and radiates down to R neck > L. Now lives at [facility], has been taking tylenol only transient improvement in symptoms. Touching the area increases pain which at time of my interview and exam is a 10/10 +mild nausea associated    S:  5/2: pt seen this AM, awake, alert, follows some commands when asked, now speaking some words, said oh my god and no, pt noted retching, when asked if she was nauseous she nodded yes.   5/3: pt seen today, awake, alert, oriented x 3, able to have conversation, tolerating orally, still with 5/10 HA and occasional nausea.   5/4: Lying in bed, pt states she does not feel well. Unable to obtain good HPI however when asked she does admit to HA.  NO fever, chills, unable to get a good ROS.    ROS: Difficult to obtain due to lethargy      Vital Signs Last 24 Hrs  T(C): 36.4 (04 May 2024 08:00), Max: 36.4 (03 May 2024 15:55)  T(F): 97.5 (04 May 2024 08:00), Max: 97.5 (03 May 2024 15:55)  HR: 96 (04 May 2024 08:00) (96 - 101)  BP: 139/77 (04 May 2024 08:00) (97/67 - 139/77)  BP(mean): --  RR: 18 (04 May 2024 08:00) (17 - 18)  SpO2: 99% (04 May 2024 08:00) (99% - 100%)    Parameters below as of 04 May 2024 08:00  Patient On (Oxygen Delivery Method): nasal cannula        PYSICAL EXAM:  GENERAL: NAD, weak and lethargic appearing  HEAD:  Atraumatic, Normocephalic  EYES: conjunctiva and sclera clear  ENT: Moist mucous membranes  NECK: Supple, No JVD  CHEST/LUNG: decreased breath sounds bilaterally; No rales, rhonchi, wheezing. Unlabored respirations  HEART: Regular rate and rhythm; No murmurs  ABDOMEN: Bowel sounds present; Soft, Nontender, Nondistended.   EXTREMITIES:  2+ Peripheral Pulses, brisk capillary refill. No clubbing, cyanosis, or edema  NERVOUS SYSTEM:  Alert, awake, speech clear. No deficits   MSK: FROM to b/l UE, weakness to b/l LE      MEDICATIONS  (STANDING):  amphetamine/dextroamphetamine 10 milliGRAM(s) Oral daily  atorvastatin 10 milliGRAM(s) Oral at bedtime  bisacodyl Suppository 10 milliGRAM(s) Rectal once  buPROPion XL (24-Hour) . 450 milliGRAM(s) Oral daily  dextrose 5% + sodium chloride 0.45%. 1000 milliLiter(s) (55 mL/Hr) IV Continuous <Continuous>  enoxaparin Injectable 40 milliGRAM(s) SubCutaneous every 24 hours  FLUoxetine 10 milliGRAM(s) Oral daily  FLUoxetine 20 milliGRAM(s) Oral daily  influenza   Vaccine 0.5 milliLiter(s) IntraMuscular once  lidocaine   4% Patch 1 Patch Transdermal daily  melatonin 10 milliGRAM(s) Oral at bedtime  riluzole 50 milliGRAM(s) Oral two times a day  senna 2 Tablet(s) Oral at bedtime    MEDICATIONS  (PRN):  acetaminophen     Tablet .. 650 milliGRAM(s) Oral every 6 hours PRN Temp greater or equal to 38C (100.4F), Mild Pain (1 - 3)  acetaminophen   IVPB .. 1000 milliGRAM(s) IV Intermittent once PRN Mild Pain (1 - 3), Moderate Pain (4 - 6)  acetaminophen 325 mG/butalbital 50 mG/caffeine 40 mG 1 Tablet(s) Oral every 8 hours PRN headache  ALPRAZolam 0.5 milliGRAM(s) Oral three times a day PRN anxiety  aluminum hydroxide/magnesium hydroxide/simethicone Suspension 30 milliLiter(s) Oral every 4 hours PRN Dyspepsia  bisacodyl Suppository 10 milliGRAM(s) Rectal daily PRN Constipation  ibuprofen  Tablet. 400 milliGRAM(s) Oral every 8 hours PRN Moderate Pain (4 - 6)  ondansetron Injectable 4 milliGRAM(s) IV Push every 8 hours PRN Nausea and/or Vomiting                                11.0   7.30  )-----------( 134      ( 03 May 2024 07:40 )             36.8     05-04    138  |  99  |  11  ----------------------------<  129<H>  3.5   |  39<H>  |  <0.15<L>    Ca    9.1      04 May 2024 09:44  Phos  3.0     05-04  Mg     1.8     05-04      CAPILLARY BLOOD GLUCOSE            Urinalysis Basic - ( 04 May 2024 09:44 )    Color: x / Appearance: x / SG: x / pH: x  Gluc: 129 mg/dL / Ketone: x  / Bili: x / Urobili: x   Blood: x / Protein: x / Nitrite: x   Leuk Esterase: x / RBC: x / WBC x   Sq Epi: x / Non Sq Epi: x / Bacteria: x        Assessment and Plan:  63 year old female w  ALS, wheelchair bound, HLD  presents w 3 days of intermittent HA    #Intractable headache and neck pain with photosensitivity likely migraine:  -MRI brain noted  -s/p Xanax and fioricet 4/30  -s/p  decadron, IV depacon  and reglan without much improvement   -did not respond to diazepam, magnesium sulfate, ondansetron, metoclopramide, IV acetaminophen, Benadryl,   -neuro following    #Acute metabolic encephalopathy due to Acute on chronic hypercarbic/hypoxic respiratory failure possible also due to meds:  -Pt had been refusing BIPAP hs in the hospital, now tolerating  -CO2 improving   -pulm consult appreciated   -TSH, ammonia normal   -Continues to improve     Hypophosphatemia:  -IV sodium phosphate 15mmol x 1 now     #Anxiety / depression:  -xanax PRN  -lexapro was DCd prior  -prozac 30 mg qd  -Wellbutrin 450 mg qd  psych eval appreciated - continue home med      #ALS  -riluzole BID  -adderall was increased from 5 to 10 at NH  -she stopped the other agent as it was not working    #Dysphagia  -has been able to tolerate reg diet w thin liquids      #HLD  -continue statin      #Severe Protein calorie malnutrition:  -ensure hi protein 1 can TID      #GOC  consulted  Palliative - eval appreciated   DNR/trial of NIV  no feeding tube  [facility]    #VTE  -lovenox      DISPO:   -once stable, lex d/c ELODIA on comfort MOLST  -pall following  -daughter Carrie as primary agent [phone]- pt DNR DNI trial NIV

## 2024-05-04 NOTE — PROGRESS NOTE ADULT - SUBJECTIVE AND OBJECTIVE BOX
Patient is a 63y old  Female who presents with a chief complaint of headache and neck pain (01 May 2024 16:08)      HPI:  63 year old female w  ALS, wheelchair bound, HLD  presents w 3 days of intermittent HA    +HA starts at top of her head from R temporo-parietal area to posterior scalp and neckposteriorly and radiates down to R neck > L  Now lives at [facility], has been taking tylenol only transient improvement in symptoms. No fever, chills, recent trauma, vision changes, new numbness or weakness, urinary symptoms, cp, sob, cough,  abdominal pain.    touching the area increases pain which at time of my interview and exam is a 10/10  +mild nausea associated    In ED /80   HR 96   RR 18   T 97.9   97% sat 3 L nc  NS x 2000 cc  acetaminophen IV/ reglan 10 mg IV, methacarbamol 750, lidocaine patch/benadryl  CT HEAD: No CT evidence for acute intracranial abnormalities.  CT C-SPINE: No acute fracture or subluxation of the cervical spine.    Placed on OBS for intractable headache  states no relief from meds in ED    covering for DR Arciniega      PAST MEDICAL HX  ALS amyotrophic lateral sclerosis dx 2021  Chronic hypoxic respiratory failure due to restrictive lung disease from ALS   requires BIPAP at night 15/5 35% back up rate 12  Dysphagia able to tolerate regular diet (Speech and swallow saw pt )  HLD hyperlidemia          SOCIAL HX  now a resident of [facility] (29 Apr 2024 03:04)      PAST MEDICAL & SURGICAL HISTORY:  ALS (amyotrophic lateral sclerosis)      HLD (hyperlipidemia)        MEDICATIONS  (STANDING):  amphetamine/dextroamphetamine 10 milliGRAM(s) Oral daily  atorvastatin 10 milliGRAM(s) Oral at bedtime  bisacodyl Suppository 10 milliGRAM(s) Rectal once  buPROPion XL (24-Hour) . 450 milliGRAM(s) Oral daily  dextrose 5% + sodium chloride 0.45%. 1000 milliLiter(s) (55 mL/Hr) IV Continuous <Continuous>  enoxaparin Injectable 40 milliGRAM(s) SubCutaneous every 24 hours  FLUoxetine 10 milliGRAM(s) Oral daily  FLUoxetine 20 milliGRAM(s) Oral daily  influenza   Vaccine 0.5 milliLiter(s) IntraMuscular once  lidocaine   4% Patch 1 Patch Transdermal daily  melatonin 10 milliGRAM(s) Oral at bedtime  riluzole 50 milliGRAM(s) Oral two times a day  senna 2 Tablet(s) Oral at bedtime  sodium phosphate 15 milliMole(s)/250 mL IVPB 15 milliMole(s) IV Intermittent once      MEDICATIONS  (PRN):  acetaminophen     Tablet .. 650 milliGRAM(s) Oral every 6 hours PRN Temp greater or equal to 38C (100.4F), Mild Pain (1 - 3)  acetaminophen 325 mG/butalbital 50 mG/caffeine 40 mG 1 Tablet(s) Oral every 8 hours PRN headache  aluminum hydroxide/magnesium hydroxide/simethicone Suspension 30 milliLiter(s) Oral every 4 hours PRN Dyspepsia  bisacodyl Suppository 10 milliGRAM(s) Rectal daily PRN Constipation  ondansetron Injectable 4 milliGRAM(s) IV Push every 8 hours PRN Nausea and/or Vomiting      FAMILY HISTORY:    Vital Signs Last 24 Hrs  T(C): 36.4 (04 May 2024 08:00), Max: 36.4 (03 May 2024 15:55)  T(F): 97.5 (04 May 2024 08:00), Max: 97.5 (03 May 2024 15:55)  HR: 96 (04 May 2024 08:00) (96 - 101)  BP: 139/77 (04 May 2024 08:00) (97/67 - 139/77)  BP(mean): --  RR: 18 (04 May 2024 08:00) (17 - 18)  SpO2: 99% (04 May 2024 08:00) (99% - 100%)    Parameters below as of 04 May 2024 08:00  Patient On (Oxygen Delivery Method): nasal cannula        PHYSICAL EXAM  General Appearance:seen while on BIPAP- well coordinated  not on BIPAP this am, resting comfortably  Neck: Supple,   Back: Symmetric, no  tenderness,no soft tissue tenderness  Lungs: Clear to auscultation bilateral,no adventitious breath sounds, normal   expiratory phase  Heart: Regular rate and rhythm, S1, S2 normal  Abdomen: Soft, non-tender, bowel sounds active  Extremities: no cyanosis or edema, no joint swelling  Skin: Skin color, texture normal, no rashes   Neurologic: limited exam    ECG:    LABS:                                   11.0   7.30  )-----------( 134      ( 03 May 2024 07:40 )             36.8   05-03    138  |  101  |  14  ----------------------------<  116<H>  4.7   |  34<H>  |  <0.15<L>    Ca    9.1      03 May 2024 07:40  Phos  1.2     05-03  Mg     2.1     05-03    TPro  6.8  /  Alb  3.5  /  TBili  0.5  /  DBili  0.1  /  AST  14<L>  /  ALT  36  /  AlkPhos  53  05-02               11.1   7.47  )-----------( 194      ( 01 May 2024 08:41 )             40.6     05-01    143  |  100  |  12  ----------------------------<  113<H>  3.7   |  42<H>  |  0.18<L>    Ca    9.7      01 May 2024 08:41                Urinalysis Basic - ( 01 May 2024 08:41 )    Color: x / Appearance: x / SG: x / pH: x  Gluc: 113 mg/dL / Ketone: x  / Bili: x / Urobili: x   Blood: x / Protein: x / Nitrite: x   Leuk Esterase: x / RBC: x / WBC x   Sq Epi: x / Non Sq Epi: x / Bacteria: x      ABG - ( 01 May 2024 17:04 )  pH, Arterial: 7.37  pH, Blood: x     /  pCO2: 78    /  pO2: 83    / HCO3: 45    / Base Excess: 15.8  /  SaO2: 99.0                  RADIOLOGY & ADDITIONAL STUDIES:    < from: Xray Chest 1 View-PORTABLE IMMEDIATE (Xray Chest 1 View-PORTABLE IMMEDIATE .) (04.29.24 @ 00:05) >    INTERPRETATION:  AP chest on April 28, 2024 11:57 PM. Concern is   hypercapnia.    Elevated diaphragms crowds the chest.    Heart magnified by technique.    Minimal left base infiltrate is unchanged.    Slight left deviation of the trachea above the clavicles consistent with   small area of right thyroid enlargement again noted.    Chest is similar to March 16 this year.    IMPRESSION: Stable chest findings as above.    < end of copied text >  < from: CT Head No Cont (04.28.24 @ 19:45) >      IMPRESSION:    CT HEAD: No CT evidence for acute intracranial abnormalities.    CT C-SPINE: No acute fracture or subluxation of the cervical spine.    < end of copied text >  < from: Xray Chest 1 View- PORTABLE-Urgent (03.12.24 @ 23:22) >  Impression:    Mild atelectasis at the bases    No fracture.    < end of copied text >

## 2024-05-05 NOTE — PROGRESS NOTE ADULT - SUBJECTIVE AND OBJECTIVE BOX
Patient is a 63y old  Female who presents with a chief complaint of headache and neck pain (01 May 2024 16:08)      HPI:  63 year old female w  ALS, wheelchair bound, HLD  presents w 3 days of intermittent HA    +HA starts at top of her head from R temporo-parietal area to posterior scalp and neckposteriorly and radiates down to R neck > L  Now lives at [facility], has been taking tylenol only transient improvement in symptoms. No fever, chills, recent trauma, vision changes, new numbness or weakness, urinary symptoms, cp, sob, cough,  abdominal pain.    touching the area increases pain which at time of my interview and exam is a 10/10  +mild nausea associated    In ED /80   HR 96   RR 18   T 97.9   97% sat 3 L nc  NS x 2000 cc  acetaminophen IV/ reglan 10 mg IV, methacarbamol 750, lidocaine patch/benadryl  CT HEAD: No CT evidence for acute intracranial abnormalities.  CT C-SPINE: No acute fracture or subluxation of the cervical spine.    Placed on OBS for intractable headache  states no relief from meds in ED    covering for DR Arciniega      PAST MEDICAL HX  ALS amyotrophic lateral sclerosis dx 2021  Chronic hypoxic respiratory failure due to restrictive lung disease from ALS   requires BIPAP at night 15/5 35% back up rate 12  Dysphagia able to tolerate regular diet (Speech and swallow saw pt )  HLD hyperlidemia          SOCIAL HX  now a resident of [facility] (29 Apr 2024 03:04)      PAST MEDICAL & SURGICAL HISTORY:  ALS (amyotrophic lateral sclerosis)      HLD (hyperlipidemia)      MEDICATIONS  (STANDING):  amphetamine/dextroamphetamine 10 milliGRAM(s) Oral daily  atorvastatin 10 milliGRAM(s) Oral at bedtime  bisacodyl Suppository 10 milliGRAM(s) Rectal once  buPROPion XL (24-Hour) . 450 milliGRAM(s) Oral daily  enoxaparin Injectable 40 milliGRAM(s) SubCutaneous every 24 hours  FLUoxetine 10 milliGRAM(s) Oral daily  FLUoxetine 20 milliGRAM(s) Oral daily  influenza   Vaccine 0.5 milliLiter(s) IntraMuscular once  lidocaine   4% Patch 1 Patch Transdermal daily  melatonin 10 milliGRAM(s) Oral at bedtime  riluzole 50 milliGRAM(s) Oral two times a day  senna 2 Tablet(s) Oral at bedtime    MEDICATIONS  (PRN):  acetaminophen     Tablet .. 650 milliGRAM(s) Oral every 6 hours PRN Temp greater or equal to 38C (100.4F), Mild Pain (1 - 3)  acetaminophen 325 mG/butalbital 50 mG/caffeine 40 mG 1 Tablet(s) Oral every 8 hours PRN headache  aluminum hydroxide/magnesium hydroxide/simethicone Suspension 30 milliLiter(s) Oral every 4 hours PRN Dyspepsia  bisacodyl Suppository 10 milliGRAM(s) Rectal daily PRN Constipation  ondansetron Injectable 4 milliGRAM(s) IV Push every 8 hours PRN Nausea and/or Vomiting      Vital Signs Last 24 Hrs  T(C): 36.1 (04 May 2024 21:17), Max: 36.3 (04 May 2024 16:03)  T(F): 97 (04 May 2024 21:17), Max: 97.4 (04 May 2024 16:03)  HR: 93 (05 May 2024 09:28) (87 - 98)  BP: 129/75 (05 May 2024 09:28) (104/82 - 129/75)  BP(mean): --  RR: 18 (05 May 2024 09:28) (18 - 18)  SpO2: 100% (05 May 2024 09:28) (99% - 100%)    Parameters below as of 05 May 2024 09:28  Patient On (Oxygen Delivery Method): nasal cannula  O2 Flow (L/min): 4      PHYSICAL EXAM  General Appearance: sleepy but arousable  not on BIPAP this am, resting comfortably  Neck: Supple,   Lungs: Clear to auscultation bilateral,no adventitious breath sounds, normal   expiratory phase  Heart: Regular rate and rhythm, S1, S2 normal  Abdomen: Soft, non-tender, bowel sounds active  Extremities: no cyanosis or edema, no joint swelling  Skin: Skin color, texture normal, no rashes   Neurologic: limited exam    ECG:    LABS:                                   11.0   7.30  )-----------( 134      ( 03 May 2024 07:40 )             36.8   05-03    138  |  101  |  14  ----------------------------<  116<H>  4.7   |  34<H>  |  <0.15<L>    Ca    9.1      03 May 2024 07:40  Phos  1.2     05-03  Mg     2.1     05-03    TPro  6.8  /  Alb  3.5  /  TBili  0.5  /  DBili  0.1  /  AST  14<L>  /  ALT  36  /  AlkPhos  53  05-02               11.1   7.47  )-----------( 194      ( 01 May 2024 08:41 )             40.6     05-01    143  |  100  |  12  ----------------------------<  113<H>  3.7   |  42<H>  |  0.18<L>    Ca    9.7      01 May 2024 08:41                Urinalysis Basic - ( 01 May 2024 08:41 )    Color: x / Appearance: x / SG: x / pH: x  Gluc: 113 mg/dL / Ketone: x  / Bili: x / Urobili: x   Blood: x / Protein: x / Nitrite: x   Leuk Esterase: x / RBC: x / WBC x   Sq Epi: x / Non Sq Epi: x / Bacteria: x      ABG - ( 01 May 2024 17:04 )  pH, Arterial: 7.37  pH, Blood: x     /  pCO2: 78    /  pO2: 83    / HCO3: 45    / Base Excess: 15.8  /  SaO2: 99.0                  RADIOLOGY & ADDITIONAL STUDIES:    < from: Xray Chest 1 View-PORTABLE IMMEDIATE (Xray Chest 1 View-PORTABLE IMMEDIATE .) (04.29.24 @ 00:05) >    INTERPRETATION:  AP chest on April 28, 2024 11:57 PM. Concern is   hypercapnia.    Elevated diaphragms crowds the chest.    Heart magnified by technique.    Minimal left base infiltrate is unchanged.    Slight left deviation of the trachea above the clavicles consistent with   small area of right thyroid enlargement again noted.    Chest is similar to March 16 this year.    IMPRESSION: Stable chest findings as above.    < end of copied text >  < from: CT Head No Cont (04.28.24 @ 19:45) >      IMPRESSION:    CT HEAD: No CT evidence for acute intracranial abnormalities.    CT C-SPINE: No acute fracture or subluxation of the cervical spine.    < end of copied text >  < from: Xray Chest 1 View- PORTABLE-Urgent (03.12.24 @ 23:22) >  Impression:    Mild atelectasis at the bases    No fracture.    < end of copied text >

## 2024-05-05 NOTE — PROGRESS NOTE ADULT - ASSESSMENT
63 year old female w  ALS, wheelchair bound, HLD  presents w 3 days of intermittent HA    +HA starts at top of her head from R temporo-parietal area to posterior scalp and neckposteriorly and radiates down to R neck > L  Now lives at [facility], has been taking tylenol only transient improvement in symptoms. No fever, chills, recent trauma, vision changes, new numbness or weakness, urinary symptoms, cp, sob, cough,  abdominal pain.    touching the area increases pain which at time of my interview and exam is a 10/10  +mild nausea associated    In ED /80   HR 96   RR 18   T 97.9   97% sat 3 L nc  NS x 2000 cc  acetaminophen IV/ reglan 10 mg IV, methacarbamol 750, lidocaine patch/benadryl  CT HEAD: No CT evidence for acute intracranial abnormalities.  CT C-SPINE: No acute fracture or subluxation of the cervical spine.    Placed on OBS for intractable headache  states no relief from meds in ED    ABG - ( 01 May 2024 17:04 )  pH, Arterial: 7.37  pH, Blood: x     /  pCO2: 78    /  pO2: 83    / HCO3: 45    / Base Excess: 15.8  /  SaO2: 99.0        Assessment / plan;  Acute on chronic hypercapnic resp failure  neurologic Dx ALS with Rest pulm dz causing xs  overall deterioration in status as presenting sxs  bibasilar atelectasis no definite consolidations  covering for Dr Arciniega  overall prognosis remains guarded  agree with palliative acre consult  trial NIV appropriate and DNR / DNI wishes respected  DC planning as per primary team / hospitalist  Dr Arciniega will resume 5/6

## 2024-05-05 NOTE — PROGRESS NOTE ADULT - SUBJECTIVE AND OBJECTIVE BOX
CC:  headache and neck pain      · Subjective and Objective:   HPI: 63 year old female w  ALS, wheelchair bound, HLD  presents w 3 days of intermittent HA, HA starts at top of her head from R temporo-parietal area to posterior scalp and neck posteriorly and radiates down to R neck > L. Now lives at [facility], has been taking tylenol only transient improvement in symptoms. Touching the area increases pain which at time of my interview and exam is a 10/10 +mild nausea associated    S:  5/2: pt seen this AM, awake, alert, follows some commands when asked, now speaking some words, said oh my god and no, pt noted retching, when asked if she was nauseous she nodded yes.   5/3: pt seen today, awake, alert, oriented x 3, able to have conversation, tolerating orally, still with 5/10 HA and occasional nausea.   5/4: Lying in bed, pt states she does not feel well. Unable to obtain good HPI however when asked she does admit to HA.  NO fever, chills, unable to get a good ROS.  5/5: In bed, difficult to understand but appears comfortable overall.  Weak and lethargic appearing.    ROS: Difficult to obtain due to lethargy    Vital Signs Last 24 Hrs  T(C): 36.1 (04 May 2024 21:17), Max: 36.3 (04 May 2024 16:03)  T(F): 97 (04 May 2024 21:17), Max: 97.4 (04 May 2024 16:03)  HR: 93 (05 May 2024 09:28) (87 - 98)  BP: 129/75 (05 May 2024 09:28) (104/82 - 129/75)  BP(mean): --  RR: 18 (05 May 2024 09:28) (18 - 18)  SpO2: 100% (05 May 2024 09:28) (99% - 100%)    Parameters below as of 05 May 2024 09:28  Patient On (Oxygen Delivery Method): nasal cannula  O2 Flow (L/min): 4      PHYSICAL EXAM:  GENERAL: NAD, weak and lethargic appearing  HEAD:  Atraumatic, Normocephalic  EYES: conjunctiva and sclera clear  ENT: Moist mucous membranes  NECK: Supple, No JVD  CHEST/LUNG: decreased breath sounds bilaterally; No rales, rhonchi, wheezing. Unlabored respirations  HEART: Regular rate and rhythm; No murmurs  ABDOMEN: Bowel sounds present; Soft, Nontender, Nondistended.   EXTREMITIES:  2+ Peripheral Pulses, brisk capillary refill. No clubbing, cyanosis, or edema  NERVOUS SYSTEM:  Alert, awake, speech clear. No deficits   MSK: FROM to b/l UE, weakness to b/l LE      MEDICATIONS  (STANDING):  amphetamine/dextroamphetamine 10 milliGRAM(s) Oral daily  atorvastatin 10 milliGRAM(s) Oral at bedtime  bisacodyl Suppository 10 milliGRAM(s) Rectal once  buPROPion XL (24-Hour) . 450 milliGRAM(s) Oral daily  enoxaparin Injectable 40 milliGRAM(s) SubCutaneous every 24 hours  FLUoxetine 10 milliGRAM(s) Oral daily  FLUoxetine 20 milliGRAM(s) Oral daily  influenza   Vaccine 0.5 milliLiter(s) IntraMuscular once  lidocaine   4% Patch 1 Patch Transdermal daily  melatonin 10 milliGRAM(s) Oral at bedtime  riluzole 50 milliGRAM(s) Oral two times a day  senna 2 Tablet(s) Oral at bedtime    MEDICATIONS  (PRN):  acetaminophen     Tablet .. 650 milliGRAM(s) Oral every 6 hours PRN Temp greater or equal to 38C (100.4F), Mild Pain (1 - 3)  acetaminophen   IVPB .. 1000 milliGRAM(s) IV Intermittent once PRN Mild Pain (1 - 3), Moderate Pain (4 - 6)  acetaminophen 325 mG/butalbital 50 mG/caffeine 40 mG 1 Tablet(s) Oral every 8 hours PRN headache  ALPRAZolam 0.5 milliGRAM(s) Oral three times a day PRN anxiety  aluminum hydroxide/magnesium hydroxide/simethicone Suspension 30 milliLiter(s) Oral every 4 hours PRN Dyspepsia  bisacodyl Suppository 10 milliGRAM(s) Rectal daily PRN Constipation  ibuprofen  Tablet. 400 milliGRAM(s) Oral every 8 hours PRN Moderate Pain (4 - 6)  ondansetron Injectable 4 milliGRAM(s) IV Push every 8 hours PRN Nausea and/or Vomiting            05-04    138  |  99  |  11  ----------------------------<  129<H>  3.5   |  39<H>  |  <0.15<L>    Ca    9.1      04 May 2024 09:44  Phos  3.0     05-04  Mg     1.8     05-04      CAPILLARY BLOOD GLUCOSE            Urinalysis Basic - ( 04 May 2024 09:44 )    Color: x / Appearance: x / SG: x / pH: x  Gluc: 129 mg/dL / Ketone: x  / Bili: x / Urobili: x   Blood: x / Protein: x / Nitrite: x   Leuk Esterase: x / RBC: x / WBC x   Sq Epi: x / Non Sq Epi: x / Bacteria: x        Assessment and Plan:  63 year old female w  ALS, wheelchair bound, HLD  presents w 3 days of intermittent HA    #Intractable headache and neck pain with photosensitivity likely migraine:  -MRI brain noted  -s/p Xanax and fioricet 4/30  -s/p  decadron, IV depacon  and reglan without much improvement   -did not respond to diazepam, magnesium sulfate, ondansetron, metoclopramide, IV acetaminophen, Benadryl,   -neuro following    #Acute metabolic encephalopathy due to Acute on chronic hypercarbic/hypoxic respiratory failure possible also due to meds:  -Pt had been refusing BIPAP hs in the hospital, now tolerating  -CO2 improving   -pulm consult appreciated   -TSH, ammonia normal   -Continues to improve     Hypophosphatemia:  -IV sodium phosphate 15mmol x 1 now     #Anxiety / depression:  -xanax PRN  -lexapro was DCd prior  -prozac 30 mg qd  -Wellbutrin 450 mg qd  psych eval appreciated - continue home med      #ALS  -riluzole BID  -adderall was increased from 5 to 10 at NH  -she stopped the other agent as it was not working    #Dysphagia  -has been able to tolerate reg diet w thin liquids      #HLD  -continue statin      #Severe Protein calorie malnutrition:  -ensure hi protein 1 can TID      #GOC  consulted  Palliative - eval appreciated   DNR/trial of NIV  no feeding tube  [facility]    #VTE  -lovenox      DISPO:   -once stable, likely d/c [facility] on comfort [facility]  -pall following  -daughter Carrie as primary agent [phone]- pt DNR DNI trial NIV

## 2024-05-06 NOTE — PROGRESS NOTE ADULT - SUBJECTIVE AND OBJECTIVE BOX
HPI: pt seen and examined with no family at bedside, patient this AM was more lethargic as per RN staff patient at this time is more alert but only shaking head yes and no to questions - asked if patient was having pain she shook head no. Will continue to follow patient.     PAIN: ( )Yes   (x )No  shook head no   DYSPNEA: ( ) Yes  (x ) No  Level:    Review of Systems:    Anxiety- denies currently  Depression- ++   Physical Discomfort- ++ neck pain  Dyspnea- denies  Constipation- denies  Diarrhea- denies  Nausea- denies  Vomiting- denies  Anorexia- ++  Weight Loss-   Cough- ++ weak cough  Secretions- denies  Fatigue- ++  Weakness- ++  Delirium- denies    All other systems reviewed and negative  Unable to obtain/Limited due to: lethargic    PHYSICAL EXAM:    Vital Signs Last 24 Hrs  T(C): 36.3 (05 May 2024 21:38), Max: 36.3 (05 May 2024 21:38)  T(F): 97.3 (05 May 2024 21:38), Max: 97.3 (05 May 2024 21:38)  HR: 68 (06 May 2024 08:28) (68 - 84)  BP: 124/66 (06 May 2024 08:28) (124/66 - 137/69)  RR: 18 (06 May 2024 03:53) (18 - 18)  SpO2: 100% (06 May 2024 08:28) (99% - 100%)    Parameters below as of 06 May 2024 08:28  Patient On (Oxygen Delivery Method): nasal cannula    PPSV2:  20 %  FAST:    General: alert, pleasant in NAD  HEENT: hypophonic   Lungs: cta b/l; poor inspiratory effort  Cardiac: tachycardic  GI: abdomen soft, nontender, nondistended +bsx4  : no suprapubic tenderness  Ext: moving upper extremities weakly  Neuro: A&Ox3. Speech soft but intact. LE flaccid      LABS:            Albumin: Albumin: 3.5 g/dL (05-02 @ 10:33)      Allergies    No Known Allergies    Intolerances      MEDICATIONS  (STANDING):  amphetamine/dextroamphetamine 10 milliGRAM(s) Oral daily  atorvastatin 10 milliGRAM(s) Oral at bedtime  bisacodyl Suppository 10 milliGRAM(s) Rectal once  buPROPion XL (24-Hour) . 150 milliGRAM(s) Oral daily  enoxaparin Injectable 40 milliGRAM(s) SubCutaneous every 24 hours  FLUoxetine 20 milliGRAM(s) Oral daily  influenza   Vaccine 0.5 milliLiter(s) IntraMuscular once  lidocaine   4% Patch 1 Patch Transdermal daily  melatonin 10 milliGRAM(s) Oral at bedtime  riluzole 50 milliGRAM(s) Oral two times a day  senna 2 Tablet(s) Oral at bedtime    MEDICATIONS  (PRN):  acetaminophen     Tablet .. 650 milliGRAM(s) Oral every 6 hours PRN Temp greater or equal to 38C (100.4F), Mild Pain (1 - 3)  acetaminophen   IVPB .. 1000 milliGRAM(s) IV Intermittent once PRN Mild Pain (1 - 3), Moderate Pain (4 - 6)  acetaminophen 325 mG/butalbital 50 mG/caffeine 40 mG 1 Tablet(s) Oral every 8 hours PRN headache  ALPRAZolam 0.5 milliGRAM(s) Oral three times a day PRN anxiety  aluminum hydroxide/magnesium hydroxide/simethicone Suspension 30 milliLiter(s) Oral every 4 hours PRN Dyspepsia  bisacodyl Suppository 10 milliGRAM(s) Rectal daily PRN Constipation  ibuprofen  Tablet. 400 milliGRAM(s) Oral every 8 hours PRN Moderate Pain (4 - 6)  ondansetron Injectable 4 milliGRAM(s) IV Push every 8 hours PRN Nausea and/or Vomiting      RADIOLOGY:

## 2024-05-06 NOTE — PROGRESS NOTE ADULT - ASSESSMENT
Pt is a 63y old Female with hx of ALS, wheelchair bound, HLD  presents w 3 days of intermittent HA    1. Intractable headache  -with associated symptoms of photosensitivity and nausea - possible migraine-  -little relief with diazepam, magnesium sulfate, ondansetron, metoclopramide, IV acetaminophen, Benadryl, then little relief from IV decadron, IV reglan,  IV depacon per neuro notes  -Tylenol PRN  -Esgic PRN as ordered by neurology  -avoiding opioids as it will cause further CO2 retention  -neuro following  -MRI brain done 5/2 although limited study  -now c/o neck pain - aching, sore - increased pain when she turns her head side to side  -will order Lidocaine patch to site  -IV Toradol 30 mg x 1  -Motrin 400 mg every 8 hours PRN moderate pain  -ice packs to back of neck as patient finds relief with cool    2. ALS  -Riluzole BID  -BIPAP nightly - has been refusing  -CO2 retention - required BIPAP yesterday  -on 5LNC  -GOC today MOLST updated to reflect patient wishes of DNR/DNI, trial NIV, send to the hospital only if pain can not be controlled, comfort measures, no feeding tube, no dialysis, determine use of IV fluids and determine use of antibiotics      Process of Care   --Reviewed dx/treatment problems and alignment with Goals of Care         Physical Aspects of Care   --Pain   intractable headache  neuro following  c/w current management     --Bowel Regimen   denies constipation   risk for constipation d/t immobility   daily dulcolax as needed     --Dyspnea   ALS  BIPAP nightly   on 5LNC  DNR/DNI trial NIV    --Nausea Vomiting   denies     --Weakness   ALS  wheelchair bound        Psychological and Psychiatric Aspects of Care:    --Grief/ Bereavement: emotional support provided   --Hx of psychiatric dx: none   --Pastoral Care Available PRN        Social Aspects of Care   --SW involved            Cultural Aspects   --Primary Language: English           Goals of Care:  see above           We discussed Palliative Care team being a supportive team when a patient has ongoing illnesses.  We also discussed that it is not an end of life care service, but can help navigate symptoms and emotional support throughout their hospital stay here.           Ethical and Legal Aspects: NA           Capacity- unable to assess at this time as patient is more lethargic and only shaking head yes no to questions will continue to evaluate          HCP/Surrogate:  sister-in-law Christine Mckeon [phone]          Code Status: DNR/DNI trial NIV    MOLST: MOLST updated 5/3/24 with limitations of DNR/DNI, trial NIV, send to the hospital only if pain can not be controlled, comfort measures, no feeding tube, no dialysis, determine use of IV fluids and determine use of antibiotics    Dispo Plan: SNF with comfort M[facility]          Discussed With: Dr. Hester coordinated with attending and SW and RN            Time Spent: 45 minutes including the care, coordination and counseling of this patient, 50% of which was spent coordinating and counseling.  independent

## 2024-05-06 NOTE — PROGRESS NOTE ADULT - SUBJECTIVE AND OBJECTIVE BOX
CC:  headache and neck pain      · Subjective and Objective:   HPI: 63 year old female w  ALS, wheelchair bound, HLD  presents w 3 days of intermittent HA, HA starts at top of her head from R temporo-parietal area to posterior scalp and neck posteriorly and radiates down to R neck > L. Now lives at [facility], has been taking tylenol only transient improvement in symptoms. Touching the area increases pain which at time of my interview and exam is a 10/10 +mild nausea associated    S:  5/2: pt seen this AM, awake, alert, follows some commands when asked, now speaking some words, said oh my god and no, pt noted retching, when asked if she was nauseous she nodded yes.   5/3: pt seen today, awake, alert, oriented x 3, able to have conversation, tolerating orally, still with 5/10 HA and occasional nausea.   5/4: Lying in bed, pt states she does not feel well. Unable to obtain good HPI however when asked she does admit to HA.  NO fever, chills, unable to get a good ROS.  5/5: In bed, difficult to understand but appears comfortable overall.  Weak and lethargic appearing.  5/6: Pt more lethargic today, responding to stimuli but not commands.  has been receiving IV meds for comfort and also has not been compliant with nocturnal bipap.  updated vladislav today on plan of care.      ROS: Difficult to obtain due to lethargy    Vital Signs Last 24 Hrs  T(C): 36.3 (05 May 2024 21:38), Max: 36.3 (05 May 2024 21:38)  T(F): 97.3 (05 May 2024 21:38), Max: 97.3 (05 May 2024 21:38)  HR: 68 (06 May 2024 08:28) (68 - 84)  BP: 124/66 (06 May 2024 08:28) (124/66 - 137/69)  BP(mean): --  RR: 18 (06 May 2024 03:53) (18 - 18)  SpO2: 100% (06 May 2024 08:28) (99% - 100%)    Parameters below as of 06 May 2024 08:28  Patient On (Oxygen Delivery Method): nasal cannula      PHYSICAL EXAM:  GENERAL: NAD, weak and lethargic appearing  HEAD:  Atraumatic, Normocephalic  EYES: conjunctiva and sclera clear  ENT: Moist mucous membranes  NECK: Supple, No JVD  CHEST/LUNG: decreased breath sounds bilaterally; No rales, rhonchi, wheezing. Unlabored respirations  HEART: Regular rate and rhythm; No murmurs  ABDOMEN: Bowel sounds present; Soft, Nontender, Nondistended.   EXTREMITIES:  2+ Peripheral Pulses, brisk capillary refill. No clubbing, cyanosis, or edema  NERVOUS SYSTEM:  Alert, awake, speech clear. No deficits   MSK: FROM to b/l UE, weakness to b/l LE      MEDICATIONS  (STANDING):  amphetamine/dextroamphetamine 10 milliGRAM(s) Oral daily  atorvastatin 10 milliGRAM(s) Oral at bedtime  bisacodyl Suppository 10 milliGRAM(s) Rectal once  buPROPion XL (24-Hour) . 450 milliGRAM(s) Oral daily  enoxaparin Injectable 40 milliGRAM(s) SubCutaneous every 24 hours  FLUoxetine 10 milliGRAM(s) Oral daily  FLUoxetine 20 milliGRAM(s) Oral daily  influenza   Vaccine 0.5 milliLiter(s) IntraMuscular once  lidocaine   4% Patch 1 Patch Transdermal daily  melatonin 10 milliGRAM(s) Oral at bedtime  riluzole 50 milliGRAM(s) Oral two times a day  senna 2 Tablet(s) Oral at bedtime    MEDICATIONS  (PRN):  acetaminophen     Tablet .. 650 milliGRAM(s) Oral every 6 hours PRN Temp greater or equal to 38C (100.4F), Mild Pain (1 - 3)  acetaminophen   IVPB .. 1000 milliGRAM(s) IV Intermittent once PRN Mild Pain (1 - 3), Moderate Pain (4 - 6)  acetaminophen 325 mG/butalbital 50 mG/caffeine 40 mG 1 Tablet(s) Oral every 8 hours PRN headache  ALPRAZolam 0.5 milliGRAM(s) Oral three times a day PRN anxiety  aluminum hydroxide/magnesium hydroxide/simethicone Suspension 30 milliLiter(s) Oral every 4 hours PRN Dyspepsia  bisacodyl Suppository 10 milliGRAM(s) Rectal daily PRN Constipation  ibuprofen  Tablet. 400 milliGRAM(s) Oral every 8 hours PRN Moderate Pain (4 - 6)  ondansetron Injectable 4 milliGRAM(s) IV Push every 8 hours PRN Nausea and/or Vomiting            05-04    138  |  99  |  11  ----------------------------<  129<H>  3.5   |  39<H>  |  <0.15<L>    Ca    9.1      04 May 2024 09:44  Phos  3.0     05-04  Mg     1.8     05-04      CAPILLARY BLOOD GLUCOSE            Urinalysis Basic - ( 04 May 2024 09:44 )    Color: x / Appearance: x / SG: x / pH: x  Gluc: 129 mg/dL / Ketone: x  / Bili: x / Urobili: x   Blood: x / Protein: x / Nitrite: x   Leuk Esterase: x / RBC: x / WBC x   Sq Epi: x / Non Sq Epi: x / Bacteria: x        Assessment and Plan:  63 year old female w  ALS, wheelchair bound, HLD  presents w 3 days of intermittent HA    #Intractable headache and neck pain with photosensitivity likely migraine:  -MRI brain noted  -s/p Xanax and fioricet 4/30  -s/p  decadron, IV depacon  and reglan without much improvement   -did not respond to diazepam, magnesium sulfate, ondansetron, metoclopramide, IV acetaminophen, Benadryl,   -neuro following    #Acute metabolic encephalopathy due to Acute on chronic hypercarbic/hypoxic respiratory failure possible also due to meds:  -Pt had been refusing BIPAP hs in the hospital, now tolerating  -CO2 improving   -pulm consult appreciated   -TSH, ammonia normal   -Continues to improve     Hypophosphatemia:  -IV sodium phosphate 15mmol x 1 now     #Anxiety / depression:  -xanax PRN  -lexapro was DCd prior  -prozac 30 mg qd  -Wellbutrin 450 mg qd  psych eval appreciated - continue home med      #ALS  -riluzole BID  -adderall was increased from 5 to 10 at NH  -she stopped the other agent as it was not working    #Dysphagia  -has been able to tolerate reg diet w thin liquids      #HLD  -continue statin      #Severe Protein calorie malnutrition:  -ensure hi protein 1 can TID      #GOC  consulted  Palliative - eval appreciated   DNR/trial of NIV  no feeding tube  [facility]    #VTE  -lovenox      DISPO:   -once stable, likely d/c to [facility] with probable transition to hospice.  -pall following  -vladislav updated on plan of care today.           CC:  headache and neck pain      · Subjective and Objective:   HPI: 63 year old female w  ALS, wheelchair bound, HLD  presents w 3 days of intermittent HA, HA starts at top of her head from R temporo-parietal area to posterior scalp and neck posteriorly and radiates down to R neck > L. Now lives at [facility], has been taking tylenol only transient improvement in symptoms. Touching the area increases pain which at time of my interview and exam is a 10/10 +mild nausea associated    S:  5/2: pt seen this AM, awake, alert, follows some commands when asked, now speaking some words, said oh my god and no, pt noted retching, when asked if she was nauseous she nodded yes.   5/3: pt seen today, awake, alert, oriented x 3, able to have conversation, tolerating orally, still with 5/10 HA and occasional nausea.   5/4: Lying in bed, pt states she does not feel well. Unable to obtain good HPI however when asked she does admit to HA.  NO fever, chills, unable to get a good ROS.  5/5: In bed, difficult to understand but appears comfortable overall.  Weak and lethargic appearing.  5/6: Pt more lethargic today, responding to stimuli but not commands.  has been receiving IV meds for comfort and also has not been compliant with nocturnal bipap.  updated vladislav today on plan of care.      ROS: Difficult to obtain due to lethargy    Vital Signs Last 24 Hrs  T(C): 36.3 (05 May 2024 21:38), Max: 36.3 (05 May 2024 21:38)  T(F): 97.3 (05 May 2024 21:38), Max: 97.3 (05 May 2024 21:38)  HR: 68 (06 May 2024 08:28) (68 - 84)  BP: 124/66 (06 May 2024 08:28) (124/66 - 137/69)  BP(mean): --  RR: 18 (06 May 2024 03:53) (18 - 18)  SpO2: 100% (06 May 2024 08:28) (99% - 100%)    Parameters below as of 06 May 2024 08:28  Patient On (Oxygen Delivery Method): nasal cannula        PHYSICAL EXAM:  GENERAL: NAD, weak and lethargic appearing  HEAD:  Atraumatic, Normocephalic  EYES: conjunctiva and sclera clear  ENT: Moist mucous membranes  NECK: Supple, No JVD  CHEST/LUNG: decreased breath sounds bilaterally; No rales, rhonchi, wheezing. Unlabored respirations  HEART: Regular rate and rhythm; No murmurs  ABDOMEN: Bowel sounds present; Soft, Nontender, Nondistended.   EXTREMITIES:  2+ Peripheral Pulses, brisk capillary refill. No clubbing, cyanosis, or edema  NERVOUS SYSTEM:  Alert, awake, speech clear. No deficits   MSK: FROM to b/l UE, weakness to b/l LE    MEDICATIONS  (STANDING):  atorvastatin 10 milliGRAM(s) Oral at bedtime  bisacodyl Suppository 10 milliGRAM(s) Rectal once  buPROPion XL (24-Hour) . 450 milliGRAM(s) Oral daily  enoxaparin Injectable 40 milliGRAM(s) SubCutaneous every 24 hours  FLUoxetine 10 milliGRAM(s) Oral daily  FLUoxetine 20 milliGRAM(s) Oral daily  influenza   Vaccine 0.5 milliLiter(s) IntraMuscular once  lidocaine   4% Patch 1 Patch Transdermal daily  melatonin 10 milliGRAM(s) Oral at bedtime  riluzole 50 milliGRAM(s) Oral two times a day  senna 2 Tablet(s) Oral at bedtime    MEDICATIONS  (PRN):  acetaminophen     Tablet .. 650 milliGRAM(s) Oral every 6 hours PRN Temp greater or equal to 38C (100.4F), Mild Pain (1 - 3)  acetaminophen   IVPB .. 1000 milliGRAM(s) IV Intermittent once PRN Mild Pain (1 - 3), Moderate Pain (4 - 6)  acetaminophen 325 mG/butalbital 50 mG/caffeine 40 mG 1 Tablet(s) Oral every 8 hours PRN headache  ALPRAZolam 0.5 milliGRAM(s) Oral three times a day PRN anxiety  aluminum hydroxide/magnesium hydroxide/simethicone Suspension 30 milliLiter(s) Oral every 4 hours PRN Dyspepsia  bisacodyl Suppository 10 milliGRAM(s) Rectal daily PRN Constipation  ibuprofen  Tablet. 400 milliGRAM(s) Oral every 8 hours PRN Moderate Pain (4 - 6)  ondansetron Injectable 4 milliGRAM(s) IV Push every 8 hours PRN Nausea and/or Vomiting      Assessment and Plan:  63 year old female w  ALS, wheelchair bound, HLD  presents w 3 days of intermittent HA    #Intractable headache and neck pain with photosensitivity likely migraine:  -no pain at this time  -MRI brain noted  -s/p Xanax and fioricet 4/30  -s/p  decadron, IV depacon  and reglan, diazepam, magnesium sulfate, ondansetron, metoclopramide, IV acetaminophen, Benadryl,   -neuro eval appreciated    #Acute toxic-metabolic encephalopathy due to Acute on chronic hypercarbic/hypoxic respiratory failure / medications:  -often not tolerating bipap, encourage compliance  -Continue supplemental O2 for goal pulse ox ~92%   -pulm following   -TSH, ammonia normal        #Anxiety / depression:  -xanax PRN but hold for sedation  -lexapro was DCd prior  -prozac 30 mg qd --> 20mg to reduce adverse effects  -Wellbutrin 450 mg qd --> 150mg to reduce adverse effects      #ALS  -riluzole BID  -adderall was increased from 5 to 10 at NH, continue current dose      #Dysphagia  -has been able to tolerate reg diet w thin liquids      #HLD  -continue statin      #Severe Protein calorie malnutrition:  -ensure hi protein 1 can TID      #GOC  consulted  Palliative - eval appreciated   DNR/trial of NIV  no feeding tube  [facility]    #VTE  -lovenox      DISPO:   -once stable, likely d/c to [facility] with probable transition to hospice.  -pall following  -vladislav updated on plan of care today.

## 2024-05-06 NOTE — PROGRESS NOTE ADULT - SUBJECTIVE AND OBJECTIVE BOX
Patient is a 63y old  Female who presents with a chief complaint of headache and neck pain (01 May 2024 16:08)      HPI:  63 year old female w  ALS, wheelchair bound, HLD  presents w 3 days of intermittent HA    +HA starts at top of her head from R temporo-parietal area to posterior scalp and neckposteriorly and radiates down to R neck > L  Now lives at [facility], has been taking tylenol only transient improvement in symptoms. No fever, chills, recent trauma, vision changes, new numbness or weakness, urinary symptoms, cp, sob, cough,  abdominal pain.  Placed on OBS for intractable headache  states no relief from meds in ED        PAST MEDICAL HX  ALS amyotrophic lateral sclerosis dx 2021  Chronic hypoxic respiratory failure due to restrictive lung disease from ALS   requires BIPAP at night 15/5 35% back up rate 12  Dysphagia able to tolerate regular diet (Speech and swallow saw pt )  HLD hyperlidemia          SOCIAL HX  now a resident of [facility] (29 Apr 2024 03:04)      PAST MEDICAL & SURGICAL HISTORY:  ALS (amyotrophic lateral sclerosis)      HLD (hyperlipidemia)      MEDICATIONS  (STANDING):  amphetamine/dextroamphetamine 10 milliGRAM(s) Oral daily  atorvastatin 10 milliGRAM(s) Oral at bedtime  bisacodyl Suppository 10 milliGRAM(s) Rectal once  buPROPion XL (24-Hour) . 450 milliGRAM(s) Oral daily  enoxaparin Injectable 40 milliGRAM(s) SubCutaneous every 24 hours  FLUoxetine 10 milliGRAM(s) Oral daily  FLUoxetine 20 milliGRAM(s) Oral daily  influenza   Vaccine 0.5 milliLiter(s) IntraMuscular once  lidocaine   4% Patch 1 Patch Transdermal daily  melatonin 10 milliGRAM(s) Oral at bedtime  riluzole 50 milliGRAM(s) Oral two times a day  senna 2 Tablet(s) Oral at bedtime    MEDICATIONS  (PRN):  acetaminophen     Tablet .. 650 milliGRAM(s) Oral every 6 hours PRN Temp greater or equal to 38C (100.4F), Mild Pain (1 - 3)  acetaminophen 325 mG/butalbital 50 mG/caffeine 40 mG 1 Tablet(s) Oral every 8 hours PRN headache  aluminum hydroxide/magnesium hydroxide/simethicone Suspension 30 milliLiter(s) Oral every 4 hours PRN Dyspepsia  bisacodyl Suppository 10 milliGRAM(s) Rectal daily PRN Constipation  ondansetron Injectable 4 milliGRAM(s) IV Push every 8 hours PRN Nausea and/or Vomiting      Vital Signs Last 24 Hrs  T(C): 36.3 (05 May 2024 21:38), Max: 36.3 (05 May 2024 21:38)  T(F): 97.3 (05 May 2024 21:38), Max: 97.3 (05 May 2024 21:38)  HR: 84 (06 May 2024 03:53) (84 - 93)  BP: 137/69 (06 May 2024 03:53) (126/70 - 137/69)  BP(mean): --  RR: 18 (06 May 2024 03:53) (18 - 18)  SpO2: 99% (06 May 2024 03:53) (99% - 100%)    Parameters below as of 06 May 2024 03:53  Patient On (Oxygen Delivery Method): nasal cannula  O2 Flow (L/min): 5      PHYSICAL EXAM  General Appearance: sleepy but arousable  not on BIPAP this am, resting comfortably  Neck: Supple,   Lungs: Clear to auscultation bilateral,no adventitious breath sounds, normal   expiratory phase  Heart: Regular rate and rhythm, S1, S2 normal  Abdomen: Soft, non-tender, bowel sounds active  Extremities: no cyanosis or edema, no joint swelling  Skin: Skin color, texture normal, no rashes   Neurologic: limited exam    ECG:    LABS:                                   11.0   7.30  )-----------( 134      ( 03 May 2024 07:40 )             36.8   05-03    138  |  101  |  14  ----------------------------<  116<H>  4.7   |  34<H>  |  <0.15<L>    Ca    9.1      03 May 2024 07:40  Phos  1.2     05-03  Mg     2.1     05-03    TPro  6.8  /  Alb  3.5  /  TBili  0.5  /  DBili  0.1  /  AST  14<L>  /  ALT  36  /  AlkPhos  53  05-02               11.1   7.47  )-----------( 194      ( 01 May 2024 08:41 )             40.6     05-01    143  |  100  |  12  ----------------------------<  113<H>  3.7   |  42<H>  |  0.18<L>    Ca    9.7      01 May 2024 08:41                Urinalysis Basic - ( 01 May 2024 08:41 )    Color: x / Appearance: x / SG: x / pH: x  Gluc: 113 mg/dL / Ketone: x  / Bili: x / Urobili: x   Blood: x / Protein: x / Nitrite: x   Leuk Esterase: x / RBC: x / WBC x   Sq Epi: x / Non Sq Epi: x / Bacteria: x      ABG - ( 01 May 2024 17:04 )  pH, Arterial: 7.37  pH, Blood: x     /  pCO2: 78    /  pO2: 83    / HCO3: 45    / Base Excess: 15.8  /  SaO2: 99.0                  RADIOLOGY & ADDITIONAL STUDIES:    < from: Xray Chest 1 View-PORTABLE IMMEDIATE (Xray Chest 1 View-PORTABLE IMMEDIATE .) (04.29.24 @ 00:05) >    INTERPRETATION:  AP chest on April 28, 2024 11:57 PM. Concern is   hypercapnia.    Elevated diaphragms crowds the chest.    Heart magnified by technique.    Minimal left base infiltrate is unchanged.    Slight left deviation of the trachea above the clavicles consistent with   small area of right thyroid enlargement again noted.    Chest is similar to March 16 this year.    IMPRESSION: Stable chest findings as above.    < end of copied text >  < from: CT Head No Cont (04.28.24 @ 19:45) >      IMPRESSION:    CT HEAD: No CT evidence for acute intracranial abnormalities.    CT C-SPINE: No acute fracture or subluxation of the cervical spine.    < end of copied text >  < from: Xray Chest 1 View- PORTABLE-Urgent (03.12.24 @ 23:22) >  Impression:    Mild atelectasis at the bases    No fracture.    < end of copied text >

## 2024-05-06 NOTE — PROGRESS NOTE ADULT - ASSESSMENT
63 year old female w  ALS, wheelchair bound, HLD  presents w 3 days of intermittent HA  +HA starts at top of her head from R temporo-parietal area to posterior scalp and neckposteriorly and radiates down to R neck > L  Now lives at [facility], has been taking tylenol only transient improvement in symptoms. No fever, chills, recent trauma, vision changes, new numbness or weakness, urinary symptoms, cp, sob, cough,  abdominal pain.  touching the area increases pain which at time of my interview and exam is a 10/10  +mild nausea associated      Assessment / plan;  Acute on chronic hypercapnic resp failure  neurologic Dx ALS with Rest pulm dz causing xs  overall deterioration in status as presenting sxs  bibasilar atelectasis no definite consolidations  overall prognosis remains guarded  agree with palliative acre consult  trial NIV appropriate and DNR / DNI wishes respected  DC planning as per primary team / hospitalist  Has BiPAP/NIPPV outpatient

## 2024-05-07 NOTE — PROGRESS NOTE ADULT - SUBJECTIVE AND OBJECTIVE BOX
HPI: pt seen and examined with no family at bedside, patient appears to have pain non verbal indicators presents patient + signs of grimacing and moaning is unable to further describe where pain is. Is alert but nonverbal at this time. Spoke with patients HCP over the phone who is in agreement with giving patient IV morphine for symptoms as patient has been unable to swallow pills. [facility] meeting to be scheduled for tomorrow 5/8     PAIN: ( )Yes   (x )No  shook head no   DYSPNEA: ( ) Yes  (x ) No  Level:    Review of Systems:    Anxiety- denies currently  Depression- ++   Physical Discomfort- ++ neck pain  Dyspnea- denies  Constipation- denies  Diarrhea- denies  Nausea- denies  Vomiting- denies  Anorexia- ++  Weight Loss-   Cough- ++ weak cough  Secretions- denies  Fatigue- ++  Weakness- ++  Delirium- denies    All other systems reviewed and negative  Unable to obtain/Limited due to: lethargic      PHYSICAL EXAM:    Vital Signs Last 24 Hrs  T(C): 37.2 (07 May 2024 08:44), Max: 37.2 (07 May 2024 08:44)  T(F): 99 (07 May 2024 08:44), Max: 99 (07 May 2024 08:44)  HR: 102 (07 May 2024 08:44) (77 - 102)  BP: 145/80 (07 May 2024 08:44) (123/68 - 145/80)  RR: 18 (07 May 2024 04:25) (18 - 19)  SpO2: 94% (07 May 2024 08:44) (94% - 98%)    Parameters below as of 07 May 2024 08:44  Patient On (Oxygen Delivery Method): BiPAP/CPAP    PPSV2:  20 %  FAST:    General: alert, pleasant in NAD  HEENT: hypophonic   Lungs: cta b/l; poor inspiratory effort  Cardiac: tachycardic  GI: abdomen soft, nontender, nondistended +bsx4  : no suprapubic tenderness  Ext: moving upper extremities weakly  Neuro: A&Ox3. Speech soft but intact. LE flaccid      LABS:    Albumin: Albumin: 3.5 g/dL (05-02 @ 10:33)      Allergies    No Known Allergies    Intolerances      MEDICATIONS  (STANDING):  amphetamine/dextroamphetamine 10 milliGRAM(s) Oral daily  atorvastatin 10 milliGRAM(s) Oral at bedtime  buPROPion XL (24-Hour) . 150 milliGRAM(s) Oral daily  enoxaparin Injectable 40 milliGRAM(s) SubCutaneous every 24 hours  FLUoxetine 20 milliGRAM(s) Oral daily  influenza   Vaccine 0.5 milliLiter(s) IntraMuscular once  lidocaine   4% Patch 1 Patch Transdermal daily  melatonin 10 milliGRAM(s) Oral at bedtime  riluzole 50 milliGRAM(s) Oral two times a day  senna 2 Tablet(s) Oral at bedtime    MEDICATIONS  (PRN):  acetaminophen     Tablet .. 650 milliGRAM(s) Oral every 6 hours PRN Temp greater or equal to 38C (100.4F), Mild Pain (1 - 3)  acetaminophen 325 mG/butalbital 50 mG/caffeine 40 mG 1 Tablet(s) Oral every 8 hours PRN headache  ALPRAZolam 0.5 milliGRAM(s) Oral three times a day PRN anxiety  aluminum hydroxide/magnesium hydroxide/simethicone Suspension 30 milliLiter(s) Oral every 4 hours PRN Dyspepsia  bisacodyl Suppository 10 milliGRAM(s) Rectal daily PRN Constipation  ibuprofen  Tablet. 400 milliGRAM(s) Oral every 8 hours PRN Moderate Pain (4 - 6)  morphine  - Injectable 1 milliGRAM(s) IV Push every 3 hours PRN pain or dyspnea  ondansetron Injectable 4 milliGRAM(s) IV Push every 8 hours PRN Nausea and/or Vomiting

## 2024-05-07 NOTE — DIETITIAN INITIAL EVALUATION ADULT - PERTINENT MEDS FT
MEDICATIONS  (STANDING):  amphetamine/dextroamphetamine 10 milliGRAM(s) Oral daily  atorvastatin 10 milliGRAM(s) Oral at bedtime  buPROPion XL (24-Hour) . 150 milliGRAM(s) Oral daily  enoxaparin Injectable 40 milliGRAM(s) SubCutaneous every 24 hours  FLUoxetine 20 milliGRAM(s) Oral daily  influenza   Vaccine 0.5 milliLiter(s) IntraMuscular once  lidocaine   4% Patch 1 Patch Transdermal daily  melatonin 10 milliGRAM(s) Oral at bedtime  riluzole 50 milliGRAM(s) Oral two times a day  senna 2 Tablet(s) Oral at bedtime    MEDICATIONS  (PRN):  acetaminophen     Tablet .. 650 milliGRAM(s) Oral every 6 hours PRN Temp greater or equal to 38C (100.4F), Mild Pain (1 - 3)  acetaminophen 325 mG/butalbital 50 mG/caffeine 40 mG 1 Tablet(s) Oral every 8 hours PRN headache  ALPRAZolam 0.5 milliGRAM(s) Oral three times a day PRN anxiety  aluminum hydroxide/magnesium hydroxide/simethicone Suspension 30 milliLiter(s) Oral every 4 hours PRN Dyspepsia  bisacodyl Suppository 10 milliGRAM(s) Rectal daily PRN Constipation  ibuprofen  Tablet. 400 milliGRAM(s) Oral every 8 hours PRN Moderate Pain (4 - 6)  morphine  - Injectable 1 milliGRAM(s) IV Push every 3 hours PRN pain or dyspnea  ondansetron Injectable 4 milliGRAM(s) IV Push every 8 hours PRN Nausea and/or Vomiting

## 2024-05-07 NOTE — PROGRESS NOTE ADULT - ASSESSMENT
Pt is a 63y old Female with hx of ALS, wheelchair bound, HLD  presents w 3 days of intermittent HA    1. Intractable headache  -with associated symptoms of photosensitivity and nausea - possible migraine-  -little relief with diazepam, magnesium sulfate, ondansetron, metoclopramide, IV acetaminophen, Benadryl, then little relief from IV decadron, IV reglan,  IV depacon per neuro notes  -Tylenol PRN  -Esgic PRN as ordered by neurology  -avoiding opioids as it will cause further CO2 retention  -neuro following  -MRI brain done 5/2 although limited study  -now c/o neck pain - aching, sore - increased pain when she turns her head side to side  -will order Lidocaine patch to site  -IV Toradol 30 mg x 1  -Motrin 400 mg every 8 hours PRN moderate pain  -ice packs to back of neck as patient finds relief with cool    2. ALS  -Riluzole BID  -BIPAP nightly - has been refusing  -CO2 retention - required BIPAP yesterday  -on 5LNC  -GOC today MOLST updated to reflect patient wishes of DNR/DNI, trial NIV, send to the hospital only if pain can not be controlled, comfort measures, no feeding tube, no dialysis, determine use of IV fluids and determine use of antibiotics      Process of Care   --Reviewed dx/treatment problems and alignment with Goals of Care         Physical Aspects of Care   --Pain   intractable headache  neuro following  c/w current management     --Bowel Regimen   denies constipation   risk for constipation d/t immobility   daily dulcolax as needed     --Dyspnea   ALS  BIPAP nightly   on 5LNC  DNR/DNI trial NIV    --Nausea Vomiting   denies     --Weakness   ALS  wheelchair bound        Psychological and Psychiatric Aspects of Care:    --Grief/ Bereavement: emotional support provided   --Hx of psychiatric dx: none   --Pastoral Care Available PRN        Social Aspects of Care   --SW involved            Cultural Aspects   --Primary Language: English           Goals of Care:  see above           We discussed Palliative Care team being a supportive team when a patient has ongoing illnesses.  We also discussed that it is not an end of life care service, but can help navigate symptoms and emotional support throughout their hospital stay here.           Ethical and Legal Aspects: NA           Capacity- unable to assess at this time as patient is more lethargic and only shaking head yes no to questions will continue to evaluate          HCP/Surrogate:  sister-in-law Christine Mckeon [phone]          Code Status: DNR/DNI trial NIV    MOLST: MOLST updated 5/3/24 with limitations of DNR/DNI, trial NIV, send to the hospital only if pain can not be controlled, comfort measures, no feeding tube, no dialysis, determine use of IV fluids and determine use of antibiotics    Dispo Plan: SNF with comfort MOLST          Discussed With: Dr. Dinh and Dr. Lozada           Case coordinated with attending and SW and RN            Time Spent: 45 minutes including the care, coordination and counseling of this patient, 50% of which was spent coordinating and counseling.

## 2024-05-07 NOTE — DIETITIAN INITIAL EVALUATION ADULT - ORAL INTAKE PTA/DIET HISTORY
Resides at [facility]. Per shadow chart: on regular diet with no texture restrictions, also receives ensure + daily. Unable to obtain diet/wt hx as pt lethargic upon RD visit.

## 2024-05-07 NOTE — DIETITIAN NUTRITION RISK NOTIFICATION - TREATMENT: THE FOLLOWING DIET HAS BEEN RECOMMENDED
Diet, Regular:   Supplement Feeding Modality:  Oral  Ensure Plus High Protein Cans or Servings Per Day:  1       Frequency:  Three Times a day (04-29-24 @ 04:34) [Active]

## 2024-05-07 NOTE — PROVIDER CONTACT NOTE (CHANGE IN STATUS NOTIFICATION) - RECOMMENDATIONS
MD Denton to bedside to assess pt, bare hugger placed on pt

## 2024-05-07 NOTE — DIETITIAN INITIAL EVALUATION ADULT - NSFNSGIIOFT_GEN_A_CORE
I&O's Detail    06 May 2024 07:01  -  07 May 2024 07:00  --------------------------------------------------------  IN:  Total IN: 0 mL    OUT:    Voided (mL): 1000 mL  Total OUT: 1000 mL    Total NET: -1000 mL

## 2024-05-07 NOTE — DIETITIAN INITIAL EVALUATION ADULT - ADD RECOMMEND
1) C/w current PO diet rx and ONS (Ensure + HP shake TID - provides 350 kcal, 20g protein/ shake)   *no other nutrition interventions warranted at this time as pt w/ comfort measures.

## 2024-05-07 NOTE — CHART NOTE - NSCHARTNOTEFT_GEN_A_CORE
called to see patient for hypothermia and AMS    Earlier was being fed mashed potatoes by family friend who called for help since pt was choking\  She was suctioned by staff  stable VS and mental status until later tonight      Pt is a 63y old Female with hx of ALS, wheelchair bound, HLD  presents w 3 days of intermittent HA and was admitted by me 04-29 for intractable headache and neck pain    ROS unable      Vital Signs Last 24 Hrs  T(C): 35.2 (07 May 2024 20:40), Max: 37.2 (07 May 2024 08:44)  T(F): 95.4 (07 May 2024 20:40), Max: 99 (07 May 2024 08:44)  HR: 90 (07 May 2024 20:40) (79 - 111)  BP: 100/49 (07 May 2024 20:40) (100/49 - 151/84)  BP(mean): --  RR: 26 (07 May 2024 20:40) (18 - 26)  SpO2: 99% (07 May 2024 20:40) (94% - 99%)    Parameters below as of 07 May 2024 20:40  Patient On (Oxygen Delivery Method): nasal cannula  O2 Flow (L/min): 3      GEN  Female upright in bed wearing nasal cannula  HEENT conj clear pupils sluggishly reactive  RESP unlabored few course BS otherwise clear   COR RR S1 and S2 no mumurs  GI soft, BS +  MSK compartments soft  DERM slightly clammy  NEURO not alert, not responsive no movement of any extremities  PSYCH flat affect      A/P    #Hypothermia  #Possible aspiration of small amount of food earlier  confirmed by hx and after suctioning  #ALS  #Currently not responsive    1. will try Maninder hugger/rewarming and reassess  2. reviewed Palliative consult note  3. DNR/DNi trial NIV called to see patient for hypothermia and AMS    Earlier was being fed mashed potatoes by family friend who called for help since pt was choking\  She was suctioned by staff  stable VS and mental status until later tonight      Pt is a 63y old Female with hx of ALS, wheelchair bound, HLD  presents w 3 days of intermittent HA and was admitted by me 04-29 for intractable headache and neck pain    ROS unable      Vital Signs Last 24 Hrs  T(C): 35.2 (07 May 2024 20:40), Max: 37.2 (07 May 2024 08:44)  T(F): 95.4 (07 May 2024 20:40), Max: 99 (07 May 2024 08:44)  HR: 90 (07 May 2024 20:40) (79 - 111)  BP: 100/49 (07 May 2024 20:40) (100/49 - 151/84)  BP(mean): --  RR: 26 (07 May 2024 20:40) (18 - 26)  SpO2: 99% (07 May 2024 20:40) (94% - 99%)    Parameters below as of 07 May 2024 20:40  Patient On (Oxygen Delivery Method): nasal cannula  O2 Flow (L/min): 3      GEN  Female upright in bed wearing nasal cannula  HEENT conj clear pupils sluggishly reactive  RESP unlabored few course BS otherwise clear   COR RR S1 and S2 no mumurs  GI soft, BS +  MSK compartments soft  DERM slightly clammy  NEURO not alert, not responsive no movement of any extremities  PSYCH flat affect      A/P    #Hypothermia  #Possible aspiration of small amount of food earlier  confirmed by hx and after suctioning  #ALS  #Currently not responsive    1. will try Maninder hugger/rewarming and reassess  2. reviewed Palliative consult note  3. DNR/DNi trial NIV reamins in orders          Reassessed 05:55  Vital Signs Last 24 Hrs  T(C): 37.6 (08 May 2024 06:00), Max: 37.6 (08 May 2024 06:00)  T(F): 99.6 (08 May 2024 06:00), Max: 99.6 (08 May 2024 06:00)  HR: 90 (07 May 2024 23:30) (88 - 111)  BP: 100/49 (07 May 2024 23:30) (100/49 - 151/84)  BP(mean): --  RR: 20 (07 May 2024 23:30) (18 - 26)  SpO2: 99% (07 May 2024 23:30) (94% - 99%)    Parameters below as of 07 May 2024 23:30  Patient On (Oxygen Delivery Method): nasal cannula  O2 Flow (L/min): 3      BiPap was added overnight as pt's respirations became more labored  GEN pt occasionally grunted  RESP unlabored while on Bipap now back to nc, BS clearer  COR RR  GI + BS soft  DERM warm and dry  NEURO occasional grunting to some questions, shaking head, lethargic        #Hypothermia : improved  hot when blanket removed  should she spike would consider CXR looking for aspiration    #Work of breathing  improved after prior decline      #Lethargy  more interactive, although not much    Aware of desire to have pt survive until daughter graduates in 9 days  Interventions enacted was to meet pt/family goals        Improved from prior assessment

## 2024-05-07 NOTE — DIETITIAN INITIAL EVALUATION ADULT - OTHER INFO
64 y/o F with PMH of Hyperlipidemia, ALS on Radicava, HLD, wheelchair bound presents with 3 days of intermittent HA starting in her neck and radiating into the back of her head. Pt lives at [facility], has been taking tylenol only transient improvement in symptoms.     Unable to obtain info upon RD visit 2/2 lethargic. Currently on regular diet w/ ensure + HP TID which is same diet pt follows at [facility]. Unable to confirm how pt has been eating during admission. Per chart notes pt has been minimally responsive/ engaging in conversations since admission. Palliative care following, per MOLST pt is DNR/ DNI/ no feeding tube. Pt currently undergoing comfort care measures and pending d/c back to [facility]. No wt hx to review in chart. Bed scale wt of 140# taken by RD on 5/7/24; mild edema may be skewing current wt appearance. NFPE reveals varying degrees of muscle/fat wasting at this time. As pt w/ comfort measures no aggressive nutrition interventions at this time. C/w regular diet and providing ensure shakes. See recs below.

## 2024-05-07 NOTE — PROGRESS NOTE ADULT - SUBJECTIVE AND OBJECTIVE BOX
CC:  headache and neck pain      · Subjective and Objective:   HPI: 63 year old female w  ALS, wheelchair bound, HLD  presents w 3 days of intermittent HA, HA starts at top of her head from R temporo-parietal area to posterior scalp and neck posteriorly and radiates down to R neck > L. Now lives at [facility], has been taking tylenol only transient improvement in symptoms. Touching the area increases pain which at time of my interview and exam is a 10/10 +mild nausea associated    S:  5/2: pt seen this AM, awake, alert, follows some commands when asked, now speaking some words, said oh my god and no, pt noted retching, when asked if she was nauseous she nodded yes.   5/3: pt seen today, awake, alert, oriented x 3, able to have conversation, tolerating orally, still with 5/10 HA and occasional nausea.   5/4: Lying in bed, pt states she does not feel well. Unable to obtain good HPI however when asked she does admit to HA.  NO fever, chills, unable to get a good ROS.  5/5: In bed, difficult to understand but appears comfortable overall.  Weak and lethargic appearing.  5/6: Pt more lethargic today, responding to stimuli but not commands.  has been receiving IV meds for comfort and also has not been compliant with nocturnal bipap.  updated vladislav today on plan of care.  5/7: Spoke to ex- at bedside. Discussed plan of care and addressed all questions and concerns to the best of my ability.  Pt awake but confused but otherwise is comfortable.    ROS: Difficult to obtain due to lethargy    Vital Signs Last 24 Hrs  T(C): 37.2 (07 May 2024 08:44), Max: 37.2 (07 May 2024 08:44)  T(F): 99 (07 May 2024 08:44), Max: 99 (07 May 2024 08:44)  HR: 102 (07 May 2024 08:44) (77 - 102)  BP: 145/80 (07 May 2024 08:44) (123/68 - 145/80)  BP(mean): --  RR: 18 (07 May 2024 04:25) (18 - 19)  SpO2: 94% (07 May 2024 08:44) (94% - 98%)    Parameters below as of 07 May 2024 08:44  Patient On (Oxygen Delivery Method): BiPAP/CPAP      PHYSICAL EXAM:  GENERAL: NAD, weak and lethargic appearing  HEAD:  Atraumatic, Normocephalic  EYES: conjunctiva and sclera clear  ENT: Moist mucous membranes  NECK: Supple, No JVD  CHEST/LUNG: decreased breath sounds bilaterally; No rales, rhonchi, wheezing. Unlabored respirations  HEART: Regular rate and rhythm; No murmurs  ABDOMEN: Bowel sounds present; Soft, Nontender, Nondistended.   EXTREMITIES:  2+ Peripheral Pulses, brisk capillary refill. No clubbing, cyanosis, or edema  NERVOUS SYSTEM:  Alert, awake, speech clear. No deficits   MSK: FROM to b/l UE, weakness to b/l LE      MEDICATIONS  (STANDING):  amphetamine/dextroamphetamine 10 milliGRAM(s) Oral daily  atorvastatin 10 milliGRAM(s) Oral at bedtime  buPROPion XL (24-Hour) . 150 milliGRAM(s) Oral daily  enoxaparin Injectable 40 milliGRAM(s) SubCutaneous every 24 hours  FLUoxetine 20 milliGRAM(s) Oral daily  influenza   Vaccine 0.5 milliLiter(s) IntraMuscular once  lidocaine   4% Patch 1 Patch Transdermal daily  melatonin 10 milliGRAM(s) Oral at bedtime  riluzole 50 milliGRAM(s) Oral two times a day  senna 2 Tablet(s) Oral at bedtime    MEDICATIONS  (PRN):  acetaminophen     Tablet .. 650 milliGRAM(s) Oral every 6 hours PRN Temp greater or equal to 38C (100.4F), Mild Pain (1 - 3)  acetaminophen 325 mG/butalbital 50 mG/caffeine 40 mG 1 Tablet(s) Oral every 8 hours PRN headache  ALPRAZolam 0.5 milliGRAM(s) Oral three times a day PRN anxiety  aluminum hydroxide/magnesium hydroxide/simethicone Suspension 30 milliLiter(s) Oral every 4 hours PRN Dyspepsia  bisacodyl Suppository 10 milliGRAM(s) Rectal daily PRN Constipation  ibuprofen  Tablet. 400 milliGRAM(s) Oral every 8 hours PRN Moderate Pain (4 - 6)  morphine  - Injectable 1 milliGRAM(s) IV Push every 3 hours PRN pain or dyspnea  ondansetron Injectable 4 milliGRAM(s) IV Push every 8 hours PRN Nausea and/or Vomiting      CAPILLARY BLOOD GLUCOSE        Assessment and Plan:  63 year old female w  ALS, wheelchair bound, HLD  presents w 3 days of intermittent HA    #Intractable headache and neck pain with photosensitivity likely migraine:  -no pain at this time  -MRI brain noted  -s/p Xanax and fioricet 4/30  -s/p  decadron, IV depacon  and reglan, diazepam, magnesium sulfate, ondansetron, metoclopramide, IV acetaminophen, Benadryl,   -neuro eval appreciated    #Acute toxic-metabolic encephalopathy due to Acute on chronic hypercarbic/hypoxic respiratory failure / medications:  -often not tolerating bipap, encourage compliance  -Continue supplemental O2 for goal pulse ox ~92%   -pulm following   -TSH, ammonia normal        #Anxiety / depression:  -xanax PRN but hold for sedation  -lexapro was DCd prior  -prozac 30 mg qd --> 20mg to reduce adverse effects  -Wellbutrin 450 mg qd --> 150mg to reduce adverse effects      #ALS  -riluzole BID  -adderall was increased from 5 to 10 at NH, continue current dose      #Dysphagia  -has been able to tolerate reg diet w thin liquids      #HLD  -continue statin      #Severe Protein calorie malnutrition:  -ensure hi protein 1 can TID      #GOC  consulted  Palliative - eval appreciated   DNR/trial of NIV  no feeding tube  [facility]    #VTE  -lovenox      DISPO:   -once stable, likely d/c to [facility] with probable transition to hospice.  -vladislav updated on plan of care 5/7.  spoke to ex- today.  Palliative closely in discussion with family as well.

## 2024-05-08 NOTE — SWALLOW BEDSIDE ASSESSMENT ADULT - DIET PRIOR TO ADMI
SEE BELOW FOR OUR NEW PHONE NUMBER!!!      Thanks for visiting us today!    Remember these important phone numbers:    [phone] for phone nurses during the day and our nurse answering service at night    [phone]   for scheduling or changing future appointments    [phone] for the Poison Control Center    When leaving a message for our staff, please include:   the spelling of your child’s full name and date of birth  your full name and relationship to child  best phone number and time to reach you   reason for the call      We strongly recommend that all children age 6 months and older receive the COVID-19 vaccine. Call our office at [phone]  to schedule.      Where's the best place on the internet to look up health information about kids?  [email or web address]  From the American Academy of Pediatrics        Is your child signed up for [employer]? If you do this, you can message us rather than playing phone tag! You can also look at labs, pay your bills, and do some scheduling. Go to your own account first. (If you don't have one yet, you can set one up at the website below or at your doctor's office).  Using a web browser (not the phone indigo), on the right hand side of your page, click the button marked \"Request Access to my Child's Records.\" Fill out the information. In a few days your child's information will be linked to your account. It's that simple!! Here is the website for more information:    [email or web address]        --------------------------------------------------------------------------------------------------------------------      as per report: [facility] at [facility]

## 2024-05-08 NOTE — SWALLOW BEDSIDE ASSESSMENT ADULT - SLP GENERAL OBSERVATIONS
pt semi recumbent in bed; arms outtstretched; stiff posture; eyes open to slits: open mouthed and short staccato shallow breaths; on inhalation there was short slight lingual forward thrust.   Unresponsive to voice and to touch

## 2024-05-08 NOTE — SWALLOW BEDSIDE ASSESSMENT ADULT - NS SPL SWALLOW CLINIC TRIAL FT
pt has end stage dysphagia 2/2 advanced ALS and now her recent event has further reduced her ability to take po 2/2 lack of mental status.    Keep NPO.  Disc with Nsg and with exhusband.   Service will follow for support and advice.

## 2024-05-08 NOTE — SWALLOW BEDSIDE ASSESSMENT ADULT - SWALLOW EVAL: ORAL MUSCULATURE
unresponsive to touch of dental swab to lubricate mouth; did not open mouth/unable to assess due to poor participation/comprehension

## 2024-05-08 NOTE — SWALLOW BEDSIDE ASSESSMENT ADULT - COMMENTS
As per H and P;  "63 year old female w  ALS, wheelchair bound, HLD  presents w 3 days of intermittent HA    +HA starts at top of her head from R temporo-parietal area to posterior scalp and neckposteriorly and radiates down to R neck > L.   Now lives at [facility], has been taking tylenol only transient improvement in symptoms. No fever, chills, recent trauma, vision changes, new numbness or weakness, urinary symptoms, cp, sob, cough,  abdominal pain.  touching the area increases pain which at time of my interview and exam is a 10/10+mild nausea associated."    Pt was reportedly fed mashed potatoes by a family friend last night and pt "choked", requiring suctioning.  Service is now consulted for po intake.

## 2024-05-08 NOTE — SWALLOW BEDSIDE ASSESSMENT ADULT - SWALLOW EVAL: DIAGNOSIS
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 with acute onset of aspiration last night.

## 2024-05-08 NOTE — PROGRESS NOTE ADULT - SUBJECTIVE AND OBJECTIVE BOX
CC:  headache and neck pain      · Subjective and Objective:   HPI: 63 year old female w  ALS, wheelchair bound, HLD  presents w 3 days of intermittent HA, HA starts at top of her head from R temporo-parietal area to posterior scalp and neck posteriorly and radiates down to R neck > L. Now lives at [facility], has been taking tylenol only transient improvement in symptoms. Touching the area increases pain which at time of my interview and exam is a 10/10 +mild nausea associated    S:  5/2: pt seen this AM, awake, alert, follows some commands when asked, now speaking some words, said oh my god and no, pt noted retching, when asked if she was nauseous she nodded yes.   5/3: pt seen today, awake, alert, oriented x 3, able to have conversation, tolerating orally, still with 5/10 HA and occasional nausea.   5/4: Lying in bed, pt states she does not feel well. Unable to obtain good HPI however when asked she does admit to HA.  NO fever, chills, unable to get a good ROS.  5/5: In bed, difficult to understand but appears comfortable overall.  Weak and lethargic appearing.  5/6: Pt more lethargic today, responding to stimuli but not commands.  has been receiving IV meds for comfort and also has not been compliant with nocturnal bipap.  updated vladislav today on plan of care.  5/7: Spoke to ex- at bedside. Discussed plan of care and addressed all questions and concerns to the best of my ability.  Pt awake but confused but otherwise is comfortable.  5/8: in bed, awake, alert, but lethargic, non-verbal to me.  Had episode of choking on food yesterday afternoon.  Also was hypothermic requiring warming blanket.  Spoke to cookie richard and also her outpatient neurologist to update on plan of care.    ROS: Difficult to obtain due to lethargy    Vital Signs Last 24 Hrs  T(C): 36.8 (08 May 2024 08:34), Max: 37.6 (08 May 2024 06:00)  T(F): 98.3 (08 May 2024 08:34), Max: 99.6 (08 May 2024 06:00)  HR: 104 (08 May 2024 08:34) (88 - 111)  BP: 136/69 (08 May 2024 08:34) (100/49 - 151/84)  BP(mean): --  RR: 18 (08 May 2024 08:34) (18 - 26)  SpO2: 93% (08 May 2024 08:34) (93% - 99%)    Parameters below as of 08 May 2024 08:34  Patient On (Oxygen Delivery Method): nasal cannula  O2 Flow (L/min): 4      PHYSICAL EXAM:  GENERAL: NAD, weak and lethargic appearing  HEAD:  Atraumatic, Normocephalic  EYES: conjunctiva and sclera clear  ENT: Moist mucous membranes  NECK: Supple, No JVD  CHEST/LUNG: decreased breath sounds bilaterally; No rales, rhonchi, wheezing. Unlabored respirations  HEART: Regular rate and rhythm; No murmurs  ABDOMEN: Bowel sounds present; Soft, Nontender, Nondistended.   EXTREMITIES:  2+ Peripheral Pulses, brisk capillary refill. No clubbing, cyanosis, or edema  NERVOUS SYSTEM:  Alert, awake, speech clear. No deficits   MSK: FROM to b/l UE, weakness to b/l LE      MEDICATIONS  (STANDING):  amphetamine/dextroamphetamine 10 milliGRAM(s) Oral daily  atorvastatin 10 milliGRAM(s) Oral at bedtime  buPROPion XL (24-Hour) . 150 milliGRAM(s) Oral daily  enoxaparin Injectable 40 milliGRAM(s) SubCutaneous every 24 hours  FLUoxetine 20 milliGRAM(s) Oral daily  influenza   Vaccine 0.5 milliLiter(s) IntraMuscular once  lidocaine   4% Patch 1 Patch Transdermal daily  melatonin 10 milliGRAM(s) Oral at bedtime  riluzole 50 milliGRAM(s) Oral two times a day  senna 2 Tablet(s) Oral at bedtime    MEDICATIONS  (PRN):  acetaminophen     Tablet .. 650 milliGRAM(s) Oral every 6 hours PRN Temp greater or equal to 38C (100.4F), Mild Pain (1 - 3)  acetaminophen 325 mG/butalbital 50 mG/caffeine 40 mG 1 Tablet(s) Oral every 8 hours PRN headache  ALPRAZolam 0.5 milliGRAM(s) Oral three times a day PRN anxiety  aluminum hydroxide/magnesium hydroxide/simethicone Suspension 30 milliLiter(s) Oral every 4 hours PRN Dyspepsia  bisacodyl Suppository 10 milliGRAM(s) Rectal daily PRN Constipation  ibuprofen  Tablet. 400 milliGRAM(s) Oral every 8 hours PRN Moderate Pain (4 - 6)  morphine  - Injectable 2 milliGRAM(s) IV Push every 3 hours PRN pain or dyspnea  ondansetron Injectable 4 milliGRAM(s) IV Push every 8 hours PRN Nausea and/or Vomiting      Assessment and Plan:  63 year old female w  ALS, wheelchair bound, HLD  presents w 3 days of intermittent HA    #Acute toxic-metabolic encephalopathy due to Acute on chronic hypercarbic/hypoxic respiratory failure / medications:  -encourage nocturnal bipap compliance  -Continue supplemental O2 for goal pulse ox ~92%, caution with too much oxygen  -may need daytime bipap at times  -TSH, ammonia normal     #Intractable headache and neck pain with photosensitivity likely migraine:  -no pain at this time, appears comfortable  -MRI brain noted  -s/p Xanax and fioricet 4/30  -s/p  decadron, IV depacon  and reglan, diazepam, magnesium sulfate, ondansetron, metoclopramide, IV acetaminophen, Benadryl,   -neuro eval appreciated  -IV morphine PRN if needed for severe pain and comfort         #Anxiety / depression:  -unable to give oral meds at this time (Wellbutrin, prozac)    #ALS  -unable to give oral meds at this time (riluzole, adderall)      #Dysphagia: Due to ALS, encephalopathy  -Increased difficulty with oral feeds/meds  -speech re-eval      #HLD  -stop statin      #Severe Protein calorie malnutrition:  -ensure hi protein 1 can TID once able to tolerate PO      #GOC  consulted  Palliative - eval appreciated   DNR/trial of NIV  no feeding tube  [facility]    #VTE  -lovenox      DISPO:   -[facility]  -vladislav updated on plan of care 5/7, 5/8.  Palliative closely in discussion with family as well.  -spoke to outpatient neuro Dr. Lambert [phone]           CC:  headache and neck pain      · Subjective and Objective:   HPI: 63 year old female w  ALS, wheelchair bound, HLD  presents w 3 days of intermittent HA, HA starts at top of her head from R temporo-parietal area to posterior scalp and neck posteriorly and radiates down to R neck > L. Now lives at [facility], has been taking tylenol only transient improvement in symptoms. Touching the area increases pain which at time of my interview and exam is a 10/10 +mild nausea associated    S:  5/2: pt seen this AM, awake, alert, follows some commands when asked, now speaking some words, said oh my god and no, pt noted retching, when asked if she was nauseous she nodded yes.   5/3: pt seen today, awake, alert, oriented x 3, able to have conversation, tolerating orally, still with 5/10 HA and occasional nausea.   5/4: Lying in bed, pt states she does not feel well. Unable to obtain good HPI however when asked she does admit to HA.  NO fever, chills, unable to get a good ROS.  5/5: In bed, difficult to understand but appears comfortable overall.  Weak and lethargic appearing.  5/6: Pt more lethargic today, responding to stimuli but not commands.  has been receiving IV meds for comfort and also has not been compliant with nocturnal bipap.  updated vladislav today on plan of care.  5/7: Spoke to ex- at bedside. Discussed plan of care and addressed all questions and concerns to the best of my ability.  Pt awake but confused but otherwise is comfortable.  5/8: in bed, awake, alert, but lethargic, non-verbal to me.  Had episode of choking on food yesterday afternoon.  Also was hypothermic requiring warming blanket.  Spoke to cookie richard and also her outpatient neurologist to update on plan of care.    ROS: Difficult to obtain due to lethargy    Vital Signs Last 24 Hrs  T(C): 36.8 (08 May 2024 08:34), Max: 37.6 (08 May 2024 06:00)  T(F): 98.3 (08 May 2024 08:34), Max: 99.6 (08 May 2024 06:00)  HR: 104 (08 May 2024 08:34) (88 - 111)  BP: 136/69 (08 May 2024 08:34) (100/49 - 151/84)  BP(mean): --  RR: 18 (08 May 2024 08:34) (18 - 26)  SpO2: 93% (08 May 2024 08:34) (93% - 99%)    Parameters below as of 08 May 2024 08:34  Patient On (Oxygen Delivery Method): nasal cannula  O2 Flow (L/min): 4      PHYSICAL EXAM:  GENERAL: NAD, weak and lethargic appearing  HEAD:  Atraumatic, Normocephalic  EYES: conjunctiva and sclera clear  ENT: Moist mucous membranes  NECK: Supple, No JVD  CHEST/LUNG: decreased breath sounds bilaterally; No rales, rhonchi, wheezing. Unlabored respirations  HEART: Regular rate and rhythm; No murmurs  ABDOMEN: Bowel sounds present; Soft, Nontender, Nondistended.   EXTREMITIES:  2+ Peripheral Pulses, brisk capillary refill. No clubbing, cyanosis, or edema  NERVOUS SYSTEM:  Alert, awake, speech clear. No deficits   MSK: FROM to b/l UE, weakness to b/l LE      MEDICATIONS  (STANDING):  amphetamine/dextroamphetamine 10 milliGRAM(s) Oral daily  atorvastatin 10 milliGRAM(s) Oral at bedtime  buPROPion XL (24-Hour) . 150 milliGRAM(s) Oral daily  enoxaparin Injectable 40 milliGRAM(s) SubCutaneous every 24 hours  FLUoxetine 20 milliGRAM(s) Oral daily  influenza   Vaccine 0.5 milliLiter(s) IntraMuscular once  lidocaine   4% Patch 1 Patch Transdermal daily  melatonin 10 milliGRAM(s) Oral at bedtime  riluzole 50 milliGRAM(s) Oral two times a day  senna 2 Tablet(s) Oral at bedtime    MEDICATIONS  (PRN):  acetaminophen     Tablet .. 650 milliGRAM(s) Oral every 6 hours PRN Temp greater or equal to 38C (100.4F), Mild Pain (1 - 3)  acetaminophen 325 mG/butalbital 50 mG/caffeine 40 mG 1 Tablet(s) Oral every 8 hours PRN headache  ALPRAZolam 0.5 milliGRAM(s) Oral three times a day PRN anxiety  aluminum hydroxide/magnesium hydroxide/simethicone Suspension 30 milliLiter(s) Oral every 4 hours PRN Dyspepsia  bisacodyl Suppository 10 milliGRAM(s) Rectal daily PRN Constipation  ibuprofen  Tablet. 400 milliGRAM(s) Oral every 8 hours PRN Moderate Pain (4 - 6)  morphine  - Injectable 2 milliGRAM(s) IV Push every 3 hours PRN pain or dyspnea  ondansetron Injectable 4 milliGRAM(s) IV Push every 8 hours PRN Nausea and/or Vomiting      Assessment and Plan:  63 year old female w  ALS, wheelchair bound, HLD  presents w 3 days of intermittent HA    #Acute toxic-metabolic encephalopathy due to Acute on chronic hypercarbic/hypoxic respiratory failure / medications:  -encourage nocturnal bipap compliance  -Continue supplemental O2 for goal pulse ox ~92%, caution with too much oxygen  -may need daytime bipap at times  -TSH, ammonia normal     #Intractable headache and neck pain with photosensitivity likely migraine:  -no pain at this time, appears comfortable  -MRI brain noted  -IV morphine PRN if needed for severe pain and comfort       #Anxiety / depression:  -unable to give oral meds at this time (Wellbutrin, prozac) - hasnt gotten for days, questionable benefits --> will d/c now    #ALS  -unable to give oral meds at this time (riluzole, adderall)  -comfort measures/supportive care    #Dysphagia: Due to ALS, encephalopathy  -Increased difficulty with oral feeds/meds  -speech re-eval      #HLD  -stop statin, no benefit in end of life care.       #Severe Protein calorie malnutrition:  -ensure hi protein 1 can TID once able to tolerate PO      #GOC  consulted  Palliative - eval appreciated   DNR/trial of NIV  no feeding tube    #VTE  -lovenox      DISPO:   -once stable, likely d/c to [facility] with probable transition to hospice is what family wishes for.  this has been challenging due to patients condition and oxygen demand.   -vladislav updated on plan of care 5/7, 5/8.  Palliative closely in discussion with family as well.  -spoke to outpatient neuro Dr. Lambert [phone]

## 2024-05-08 NOTE — PROGRESS NOTE ADULT - SUBJECTIVE AND OBJECTIVE BOX
HPI: pt seen and examined this AM with ex  at bedside, patient non verbal but opens eyes briefly patient appears more comfortable at this time - as per RN staff patient was moaning this AM but relief with IV morphine     PAIN: ( x)Yes   (x )No  pt moaning no n verbal indicators present   DYSPNEA: ( ) Yes  (x ) No  Level:    Review of Systems:    Unable to obtain/Limited due to: lethargic    PHYSICAL EXAM:    Vital Signs Last 24 Hrs  T(C): 36.3 (08 May 2024 16:21), Max: 37.6 (08 May 2024 06:00)  T(F): 97.3 (08 May 2024 16:21), Max: 99.6 (08 May 2024 06:00)  HR: 63 (08 May 2024 16:21) (63 - 104)  BP: 95/56 (08 May 2024 16:21) (95/56 - 148/89  RR: 18 (08 May 2024 16:21) (18 - 26)  SpO2: 95% (08 May 2024 16:21) (88% - 99%)    Parameters below as of 08 May 2024 16:21  Patient On (Oxygen Delivery Method): BiPAP/CPAP    PPSV2:  10 %  FAST:    General: alert, pleasant in NAD  HEENT: dry oral mucosa   Lungs: cta b/l; poor inspiratory effort   Cardiac: tachycardic  GI: abdomen soft, nontender, nondistended +bsx4  : no suprapubic tenderness  Ext: moving upper extremities weakly  Neuro: non verbal unable to fully assess     LABS:            Albumin: Albumin: 3.5 g/dL (05-02 @ 10:33)      Allergies    No Known Allergies    Intolerances      MEDICATIONS  (STANDING):  amphetamine/dextroamphetamine 10 milliGRAM(s) Oral daily  enoxaparin Injectable 40 milliGRAM(s) SubCutaneous every 24 hours  lidocaine   4% Patch 1 Patch Transdermal daily  riluzole 50 milliGRAM(s) Oral two times a day    MEDICATIONS  (PRN):  acetaminophen     Tablet .. 650 milliGRAM(s) Oral every 6 hours PRN Temp greater or equal to 38C (100.4F), Mild Pain (1 - 3)  aluminum hydroxide/magnesium hydroxide/simethicone Suspension 30 milliLiter(s) Oral every 4 hours PRN Dyspepsia  bisacodyl Suppository 10 milliGRAM(s) Rectal daily PRN Constipation  morphine  - Injectable 2 milliGRAM(s) IV Push every 3 hours PRN pain or dyspnea  ondansetron Injectable 4 milliGRAM(s) IV Push every 8 hours PRN Nausea and/or Vomiting      RADIOLOGY:

## 2024-05-08 NOTE — PROGRESS NOTE ADULT - ASSESSMENT
Pt is a 63y old Female with hx of ALS, wheelchair bound, HLD  presents w 3 days of intermittent HA    1. Intractable headache  -with associated symptoms of photosensitivity and nausea - possible migraine-  -little relief with diazepam, magnesium sulfate, ondansetron, metoclopramide, IV acetaminophen, Benadryl, then little relief from IV decadron, IV reglan,  IV depacon per neuro notes  -Tylenol PRN  -Esgic PRN as ordered by neurology  -avoiding opioids as it will cause further CO2 retention  -neuro following  -MRI brain done 5/2 although limited study  -now c/o neck pain - aching, sore - increased pain when she turns her head side to side  -will order Lidocaine patch to site  -IV Toradol 30 mg x 1  -Motrin 400 mg every 8 hours PRN moderate pain  -ice packs to back of neck as patient finds relief with cool    2. ALS  -Riluzole BID  -BIPAP nightly - has been refusing  -CO2 retention - required BIPAP yesterday  -on 5LNC  -GOC today MOLST updated to reflect patient wishes of DNR/DNI, trial NIV, send to the hospital only if pain can not be controlled, comfort measures, no feeding tube, no dialysis, determine use of IV fluids and determine use of antibiotics      Process of Care   --Reviewed dx/treatment problems and alignment with Goals of Care         Physical Aspects of Care   --Pain   moaning non verbal indicators present   - morphine 2mg IV q3hrs PRN for pain or dyspnea     --Bowel Regimen   denies constipation   risk for constipation d/t immobility   daily dulcolax as needed     --Dyspnea   ALS  BIPAP nightly   on 5LNC  DNR/DNI trial NIV    --Nausea Vomiting   denies     --Weakness   ALS  wheelchair bound        Psychological and Psychiatric Aspects of Care:    --Grief/ Bereavement: emotional support provided   --Hx of psychiatric dx: none   --Pastoral Care Available PRN        Social Aspects of Care   --SW involved            Cultural Aspects   --Primary Language: English           Goals of Care:  see above           We discussed Palliative Care team being a supportive team when a patient has ongoing illnesses.  We also discussed that it is not an end of life care service, but can help navigate symptoms and emotional support throughout their hospital stay here.           Ethical and Legal Aspects: NA           Capacity- unable to assess at this time as patient is more lethargic and only shaking head yes no to questions will continue to evaluate          HCP/Surrogate:  sister-in-law Christine Mckeon [phone]          Code Status: DNR/DNI trial NIV    MOLST: MOLST updated 5/3/24 with limitations of DNR/DNI, trial NIV, send to the hospital only if pain can not be controlled, comfort measures, no feeding tube, no dialysis, determine use of IV fluids and determine use of antibiotics    Dispo Plan: SNF with comfort MOLST          Discussed With: Dr. Dinh and Dr. Neville Mayfield coordinated with attending and SW and RN            Time Spent: 45 minutes including the care, coordination and counseling of this patient, 50% of which was spent coordinating and counseling.

## 2024-05-09 NOTE — DISCHARGE NOTE PROVIDER - NSDCCPCAREPLAN_GEN_ALL_CORE_FT
PRINCIPAL DISCHARGE DIAGNOSIS  Diagnosis: Acute respiratory failure with hypercapnia  Assessment and Plan of Treatment: Due to progressive ALS  -supportive care/comfort care/hospice care.

## 2024-05-09 NOTE — PROGRESS NOTE ADULT - SUBJECTIVE AND OBJECTIVE BOX
HPI:        PAtient is on bipap this afternoon and is not tolerating time off d/t signficant desaturation and symptoms    Family discussed comfort weaning- awaiting her second daughter to arrive from university to bedside.       PAtients goal was symptom managmed nad free of suffering.       PAtient once fmaily decides ready for wean will need agressive sympto mmanagment.         D/t patients severe symptoms I would reccomend the following weaning protocol d/t her level of dependence on the bipap:       4mg IV Morphine ONE TIME ~10-15mins prior to removing mask in anticipation of severe dyspnea    1mg IV  Ativan in anticipation of severe agitation.     0.2mg IV Glyccopyrrolate for secretions       These should be given prior to mask removal,     with that, please monitor symptoms closely...     2mg IV Morphine [id number] x 3 doses  for signs of dyspnea.     If after succession patient remains signficantly symptomatic increase dose from 2mg IV Morphine to 4mg IVMorphine [id number] prn for severe dyspnea/pain .         PAIN: ( x)Yes   (x )No  pt moaning no n verbal indicators present   DYSPNEA: ( ) Yes  (x ) No  Level:    Review of Systems:    Unable to obtain/Limited due to: lethargic    PHYSICAL EXAM:    Vital Signs Last 24 Hrs  T(C): 36.3 (08 May 2024 16:21), Max: 37.6 (08 May 2024 06:00)  T(F): 97.3 (08 May 2024 16:21), Max: 99.6 (08 May 2024 06:00)  HR: 63 (08 May 2024 16:21) (63 - 104)  BP: 95/56 (08 May 2024 16:21) (95/56 - 148/89  RR: 18 (08 May 2024 16:21) (18 - 26)  SpO2: 95% (08 May 2024 16:21) (88% - 99%)    Parameters below as of 08 May 2024 16:21  Patient On (Oxygen Delivery Method): BiPAP/CPAP    PPSV2:  10 %  FAST:    General:lethargic  HEENT: dry oral mucosa   Lungs: cta b/l; poor inspiratory effort on bipap  Cardiac: tachycardic  GI: abdomen soft, nontender, nondistended +bsx4   Neuro: non verbal unable to fully assess     LABS:            Albumin: Albumin: 3.5 g/dL (05-02 @ 10:33)      Allergies    No Known Allergies    Intolerances  MEDICATIONS  (STANDING):  amphetamine/dextroamphetamine 10 milliGRAM(s) Oral daily  enoxaparin Injectable 40 milliGRAM(s) SubCutaneous every 24 hours  lidocaine   4% Patch 1 Patch Transdermal daily  riluzole 50 milliGRAM(s) Oral two times a day    MEDICATIONS  (PRN):  acetaminophen  Suppository .. 650 milliGRAM(s) Rectal every 6 hours PRN Temp greater or equal to 38C (100.4F), Mild Pain (1 - 3)  aluminum hydroxide/magnesium hydroxide/simethicone Suspension 30 milliLiter(s) Oral every 4 hours PRN Dyspepsia  bisacodyl Suppository 10 milliGRAM(s) Rectal daily PRN Constipation  morphine  - Injectable 2 milliGRAM(s) IV Push every 3 hours PRN pain or dyspnea  ondansetron Injectable 4 milliGRAM(s) IV Push every 8 hours PRN Nausea and/or Vomiting

## 2024-05-09 NOTE — DISCHARGE NOTE PROVIDER - HOSPITAL COURSE
CC:  headache and neck pain      · Subjective and Objective:   HPI: 63 year old female w  ALS, wheelchair bound, HLD  presents w 3 days of intermittent HA, HA starts at top of her head from R temporo-parietal area to posterior scalp and neck posteriorly and radiates down to R neck > L. Now lives at [facility], has been taking tylenol only transient improvement in symptoms. Touching the area increases pain which at time of my interview and exam is a 10/10 +mild nausea associated    Hospital course:  pt with acute toxic-metabolic encephalopathy due to Acute on chronic hypercarbic/hypoxic respiratory failure / medications.  Pt has been declining, requiring frequent BIPAP for hypercapnea.  Overall pt requiring IV meds due to her dysphagia.  Discussed with multiple family members, including Christine,  pt's ex , as well as daughter plan of care.  Wishes of patient are clear to transition to hospice care, with family on board.  Palliative care services following for further support.    ROS: Unable to obtain due to AMS.    Vital Signs Last 24 Hrs  T(C): 36.4 (09 May 2024 09:19), Max: 36.4 (08 May 2024 23:36)  T(F): 97.5 (09 May 2024 09:19), Max: 97.6 (08 May 2024 23:36)  HR: 136 (09 May 2024 09:19) (63 - 143)  BP: 120/86 (09 May 2024 09:19) (95/56 - 120/86)  BP(mean): --  RR: 22 (09 May 2024 09:19) (18 - 22)  SpO2: 93% (09 May 2024 09:19) (93% - 96%)    Parameters below as of 09 May 2024 09:19  Patient On (Oxygen Delivery Method): BiPAP/CPAP    PHYSICAL EXAM:  GENERAL: NAD, weak and lethargic appearing, bipap in place  HEAD:  Atraumatic, Normocephalic  EYES: conjunctiva and sclera clear  ENT: dry mucous membranes  NECK: Supple, No JVD  CHEST/LUNG: decreased breath sounds bilaterally  HEART: Regular rate and rhythm; No murmurs  ABDOMEN: Bowel sounds present; Soft, Nontender, Nondistended.     med/labs: Reviewed and interpreted       Assessment and Plan:  63 year old female w  ALS, wheelchair bound, HLD  presents w 3 days of intermittent HA    #Acute toxic-metabolic encephalopathy due to Acute on chronic hypercarbic/hypoxic respiratory failure / medications:  -encourage nocturnal bipap compliance, pt requiring bipap more frequently.  I spoke with daughter at bedside, plan is for inpatient hospice.  -Continue supplemental O2 for goal pulse ox ~92%, caution with too much oxygen  -needs daytime bipap at times  -TSH, ammonia normal   -IV morphine PRN for severe pain and comfort and work of breathing      #ALS / dysphagia / Severe Protein calorie malnutrition:   -unable to give oral meds at this time (riluzole, adderall)  -comfort measures/supportive care  -speech re-eval --> NPO status     #Anxiety / depression:  -unable to give oral meds at this time (Wellbutrin, prozac) - hasnt gotten for days, questionable benefits --> discontinued       #HLD  -stop statin, no benefit in end of life care.       #[facility]  Hospice    #VTE  -lovenox      DISPO:   -Hospice    Attending Statement: 40 minutes spent on total encounter and discharge planning.

## 2024-05-09 NOTE — DISCHARGE NOTE PROVIDER - DETAILS OF MALNUTRITION DIAGNOSIS/DIAGNOSES
This patient has been assessed with a concern for Malnutrition and was treated during this hospitalization for the following Nutrition diagnosis/diagnoses:     -  05/07/2024: Severe protein-calorie malnutrition

## 2024-05-09 NOTE — PROGRESS NOTE ADULT - PROBLEM SELECTOR PROBLEM 1
ALS (amyotrophic lateral sclerosis)

## 2024-05-09 NOTE — PROGRESS NOTE ADULT - ASSESSMENT
Plan:     Weaning protocol I reccomend:       4mg IV Morphine ONE TIME ~10-15mins prior to removing mask in anticipation of severe dyspnea    1mg IV  Ativan in anticipation of severe agitation.     0.2mg IV Glyccopyrrolate for secretions       These should be given prior to mask removal,     with that, please monitor symptoms closely...     2mg IV Morphine [id number] x 3 doses  for signs of dyspnea.     If after succession patient remains signficantly symptomatic increase dose from 2mg IV Morphine to 4mg IVMorphine [id number] prn for severe dyspnea/pain .          IF PRIOR TO WEANING PATIENT HAS NEEDED SIGNIFICANT DOSES OF MORPHINE WOULD CONSIDER STARTING MORPHINE DRIP PRIOR TO REMOVING THE MASK  BUT AS OF NOW THE PATIENT ONLY REQUIRED ONE DOSE OF 2MG OF MORPHINE WHILE MASK REMAINS IN PLACE-)      --Pain   no pain noted on exam  c/w current management    --Bowel Regimen     risk for constipation d/t immobility  daily dulcolax    --Dyspnea   on bipap dyspenea highlgy likely  comfortable and in NAD    Dyspnea: Morphine 2mg IV [id number] PRN  if relief does not last more than 2 hours, consider increaseing to 4mg IV [id number] prn       --Anxiety:   Ativan 0.25 mg [id number] PRN    --Agitation:   Haldol 0.5mg [id number] PRN     --Oral Care:  Biotene spray PRN    --Eye Care:   Artifical tears    --Oral Secretions:   Scopolamine patch [id number] PRN      --Nausea Vomiting  denies    --Weakness  PT as tolerated       CASE D/W Dr. Lozada

## 2024-05-10 NOTE — PROGRESS NOTE ADULT - ASSESSMENT
Pt is a 63y old Female with hx of ALS, wheelchair bound, HLD  presents w 3 days of intermittent HA    1. ALS  -Riluzole BID  -BIPAP nightly - now has been needing 24 hours a day - plan is to focus on patients comfort pre medicate and remove this AM     Process of Care   --Reviewed dx/treatment problems and alignment with Goals of Care         Physical Aspects of Care   --Pain   moaning non verbal indicators present   - morphine 2mg IV q3hrs PRN for pain or dyspnea     --Bowel Regimen   denies constipation   risk for constipation d/t immobility   daily dulcolax as needed     --Dyspnea   ALS  - patient has now been requiring BiPAP 24 hours a day - plan is to remove and focus on comfort   - added 1 times dose of morphine 4mg IV to be given 15 minutes prior to removing BiPAP   - added ativan 1mg 1 time dose IV before removal pf BiPAP   - added ativan 0.5mg q4hrs for agitation PRN   - added morphine 2mg IV q1hr for air hunger or pain    - Rubinol added for secretions 2mg IV every 6 hours   --Nausea Vomiting   denies     --Weakness   ALS  wheelchair bound        Psychological and Psychiatric Aspects of Care:    --Grief/ Bereavement: emotional support provided   --Hx of psychiatric dx: none   --Pastoral Care Available PRN        Social Aspects of Care   --SW involved            Cultural Aspects   --Primary Language: English           Goals of Care:  see above           We discussed Palliative Care team being a supportive team when a patient has ongoing illnesses.  We also discussed that it is not an end of life care service, but can help navigate symptoms and emotional support throughout their hospital stay here.           Ethical and Legal Aspects: NA           Capacity- unable to assess at this time as patient is more lethargic and only shaking head yes no to questions will continue to evaluate          HCP/Surrogate:  sister-in-law Christine Mckeon [phone]          Code Status: DNR/DNI trail on NIV     MOLST: MOLST updated 5/3/24 with limitations of DNR/DNI, trial NIV, send to the hospital only if pain can not be controlled, comfort measures, no feeding tube, no dialysis, determine use of IV fluids and determine use of antibiotics    Dispo Plan: to focus on comfort - referral to be placed to Hospice care network is patient is stable for transfer           Discussed With: Dr. Dinh and Dr. Lozada           Case coordinated with attending and SW and RN            Time Spent: 45 minutes including the care, coordination and counseling of this patient, 50% of which was spent coordinating and counseling.

## 2024-05-10 NOTE — GOALS OF CARE CONVERSATION - ADVANCED CARE PLANNING - CONVERSATION DETAILS
Met with family to further discuss GOC and determine plans.  Plan to remove the bipap this morning, Pts pain medications discussed in details and all questions addressed.  Family desire Pt to be comfortable and pain free.  We discussed in the event Pt is stable for transfer inpatient hospice.  Family desires a referral to [facility] for inpatient hospice in the event Pt is able to transfer.  Families feelings explored.  Support provided.      Family in agreement with plan to remove bipap at 11 am this morning.  Medications discussed in details.  Family aware of palliative team availability.  Comfort measures in place.  Referral to be made for inpatient hospice at [facility] in the event Pt is stable for transfer.  Emotional support provided.  Our team to continue to follow.
Met with daughterCarrie in the caregiver center to discuss GOC and further determine plans.  Christine, primary health care agent involved via telephone.  We discussed Pts comfort and pain medications in details.  Pt currently on bipap.  Family shared the concern that Pt was unable to come off the bipap this morning as she was struggling to breath and desating quickly.  They do not wish to attempt to ween off bipap until Pts daughter returns from college.  Pts daughter to return from college later on this evening.  Family shared they desire Pt to be comfortable and pain free, we discussed pre medicating Pt prior to removal of bipap, if able.  Pt currently receiving IV morphine.  We discussed in the event she is stable off bipap the option to transfer to inpatient hospice, which they are in agreement with.  Doug desire family to be present when Pt comes off bipap.  Family aware her condition can continue to worsen at any time.  Families feelings explored.  Support provided.    Plan to remove bipap likely tomorrow when Pts daughter returns from college.  Daughter finishing her final this date and will be returning home this evening.  Family aware of palliative team availability.  Emotional support provided.  Our team to continue to follow.
Palliative team met with Pt and friend, Christine (health care agent) at the bedside to discuss GOC, assist with planning and provide supportive counseling.  Palliative role explained.  Emotional support provided.  Pt is more alert today.  Appeared alert and oriented, able to make needs known.  Pt shared that she feels she has been declining and has a poor quality of life due to her ALS and pain.  She shared she does not wish to return back to [facility] but is hoping to transfer to [facility].  Pts feelings explored in details.  Support provided.    Pt inquired about changing the focus to comfort at this time.  We discussed the option to change the focus to comfort and update Pts MOLST to reflect her wishes.  MOLST completed to reflect DNR/DNI, trial NIV, send to the hospital only if pain can not be controlled, comfort measures, no feeding tube, no dialysis, determine use of IV fluids and determine use of antibiotics, no routine blood work but only if the need arises.  Pt expressed her daughters will be returning home from college in about 10 days, her goal was to be with them over the summer however due to her pain at this time she has decided that she wants to change the focus to comfort at this time.  Pt desires some medical interventions that could be done at SNF but does not want aggressive medical interventions.  Pt. desires all of her medical care with the goal of keeping her pain free and comfortable but desires IV fluids, antibiotics and blood work to be determined on a case by case basis as she reports if she has an infections she desires such interventions if they are appropriate.    We discussed the option to enroll in the support of hospice in the future if she desires to do so at her SNF once her medicaid is converted from community to chronic medicaid.   Pt shared she is overwhelmed with information regarding insurance therefore this information was discussed with health care agent as per Pt request.      HCP completed to name Christine (friend) as primary agent & Carrie (daughter) as alternate agent.  The original HCP given to Pt and health care agent and copy placed on the medical record.    Plan for [facility], Pt desires [facility] at this time.  MOLST completed to reflect DNR/DNI, trial NIV, send to the hospital only if pain can not be controlled, comfort measures, no feeding tube, no dialysis, determine use of IV fluids and determine use of antibiotics.  Emotional support provided.  Our team to continue to follow.

## 2024-05-10 NOTE — PROGRESS NOTE ADULT - REASON FOR ADMISSION
headache and neck pain

## 2024-05-10 NOTE — PROGRESS NOTE ADULT - WHAT MATTERS MOST
comfort
to get patient in facility with appropriate level of care
her comfort and quality of life
patient being comfortable

## 2024-05-10 NOTE — DISCHARGE NOTE FOR THE EXPIRED PATIENT - HOSPITAL COURSE
Acute toxic-metabolic encephalopathy due to Acute on chronic hypercarbic/hypoxic respiratory failure and progressive ALS.  Pt placed on hospice care. Called to bedside by NP due to pt not breathing.  Pt examined, without heart or breath sounds, pulseless.  Time of death noted to be at 4:00PM. Acute toxic-metabolic encephalopathy due to Acute on chronic hypercarbic/hypoxic respiratory failure and progressive ALS.  Pt placed on hospice care. Called to bedside by NP due to pt not breathing.  Pt examined, without heart or breath sounds, pulseless.  Time of death noted to be at 4:00PM.  PCP Dr. Faustin office made aware.

## 2024-05-10 NOTE — PROGRESS NOTE ADULT - NUTRITIONAL ASSESSMENT
This patient has been assessed with a concern for Malnutrition and has been determined to have a diagnosis/diagnoses of Severe protein-calorie malnutrition.    This patient is being managed with:   Diet NPO-  Entered: May  8 2024  2:47PM  
This patient has been assessed with a concern for Malnutrition and has been determined to have a diagnosis/diagnoses of Severe protein-calorie malnutrition.    This patient is being managed with:   Diet Regular-  Supplement Feeding Modality:  Oral  Ensure Plus High Protein Cans or Servings Per Day:  1       Frequency:  Three Times a day  Entered: Apr 29 2024  4:34AM

## 2024-05-10 NOTE — PROGRESS NOTE ADULT - CONVERSATION/DISCUSSION
Diagnosis/Prognosis/MOLST Discussed/Treatment Options/Hospice Referral
Diagnosis/Prognosis/MOLST Discussed/Treatment Options
Diagnosis/Prognosis/Treatment Options/Hospice Referral

## 2024-05-10 NOTE — PROGRESS NOTE ADULT - SUBJECTIVE AND OBJECTIVE BOX
HPI: pt seen and examined this AM with daughters at bedside, patient now on BiPAP 24 hours a day - [facility] meeting held today with patients daughters and sister in law Christine who is HCP and have decided to focus on comfort. Patient will be pre medicated and BiPAP removed. Please refer to GOC note       PAIN: (x )Yes   ( )No  moans at times but otherwsie unable to participate in conversations     DYSPNEA: ( x ) Yes  ( ) No  Level: requiring BiPAP - family would like removed and will focus on patients comfort     Review of Systems:    Unable to obtain/Limited due to: AMS     PHYSICAL EXAM:    Vital Signs Last 24 Hrs  T(C): 38.6 (10 May 2024 08:36), Max: 38.6 (10 May 2024 08:36)  T(F): 101.4 (10 May 2024 08:36), Max: 101.4 (10 May 2024 08:36)  HR: 153 (10 May 2024 08:36) (138 - 153)  BP: 85/70 (10 May 2024 08:36) (85/70 - 106/86)  RR: 28 (10 May 2024 08:36) (20 - 28)  SpO2: 91% (10 May 2024 08:36) (91% - 96%)    Parameters below as of 10 May 2024 08:36  Patient On (Oxygen Delivery Method): BiPAP/CPAP    PPSV2:  10 %  FAST:    General: alert, pleasant in NAD  HEENT: dry oral mucosa   Lungs: cta b/l; poor inspiratory effort   Cardiac: tachycardic  GI: abdomen soft, nontender, nondistended +bsx4  : no suprapubic tenderness  Ext: moving upper extremities weakly  Neuro: non verbal unable to fully assess     LABS:    Albumin:     Allergies    No Known Allergies    Intolerances      MEDICATIONS  (STANDING):  amphetamine/dextroamphetamine 10 milliGRAM(s) Oral daily  enoxaparin Injectable 40 milliGRAM(s) SubCutaneous every 24 hours  lidocaine   4% Patch 1 Patch Transdermal daily  LORazepam   Injectable 1 milliGRAM(s) IV Push once  morphine  - Injectable 4 milliGRAM(s) IV Push once  riluzole 50 milliGRAM(s) Oral two times a day    MEDICATIONS  (PRN):  acetaminophen  Suppository .. 650 milliGRAM(s) Rectal every 6 hours PRN Temp greater or equal to 38C (100.4F), Mild Pain (1 - 3)  aluminum hydroxide/magnesium hydroxide/simethicone Suspension 30 milliLiter(s) Oral every 4 hours PRN Dyspepsia  bisacodyl Suppository 10 milliGRAM(s) Rectal daily PRN Constipation  glycopyrrolate Injectable 0.2 milliGRAM(s) IV Push every 6 hours PRN secretions  LORazepam   Injectable 0.5 milliGRAM(s) IV Push every 4 hours PRN Agitation  morphine  - Injectable 2 milliGRAM(s) IV Push every 3 hours PRN pain or dyspnea  morphine  - Injectable 2 milliGRAM(s) IV Push every 1 hour PRN air hunger or pain  ondansetron Injectable 4 milliGRAM(s) IV Push every 8 hours PRN Nausea and/or Vomiting

## 2024-05-10 NOTE — PROGRESS NOTE ADULT - SUBJECTIVE AND OBJECTIVE BOX
CC:  AMS      · Subjective and Objective:   HPI: 63 year old female w  ALS, wheelchair bound, HLD  presents w 3 days of intermittent HA, HA starts at top of her head from R temporo-parietal area to posterior scalp and neck posteriorly and radiates down to R neck > L. Now lives at [facility], has been taking tylenol only transient improvement in symptoms. Touching the area increases pain which at time of my interview and exam is a 10/10 +mild nausea associated    S:  5/10: confused, had bipap on overnight.  Mildly uncomfortable. Family present.  Palliative care helping with comfort/hospice measures.    ROS: unable to obtain    Vital Signs Last 24 Hrs  T(C): 38.3 (10 May 2024 10:45), Max: 38.6 (10 May 2024 08:36)  T(F): 101 (10 May 2024 10:45), Max: 101.4 (10 May 2024 08:36)  HR: 153 (10 May 2024 08:36) (138 - 153)  BP: 85/70 (10 May 2024 08:36) (85/70 - 106/86)  BP(mean): --  RR: 28 (10 May 2024 08:36) (20 - 28)  SpO2: 91% (10 May 2024 08:36) (91% - 96%)    Parameters below as of 10 May 2024 08:36  Patient On (Oxygen Delivery Method): BiPAP/CPAP        PHYSICAL EXAM:  GENERAL: weak, lethargic  HEAD:  Atraumatic, Normocephalic  EYES: conjunctiva and sclera clear  ENT: dry mucous membranes  NECK: Supple, No JVD  HEART: Regular rate and rhythm; No murmurs  ABDOMEN: Bowel sounds present; Soft, Nontender, Nondistended.   EXTREMITIES:  2+ Peripheral Pulses, brisk capillary refill. No clubbing, cyanosis, or edema  NERVOUS SYSTEM:  Alert, awake, speech clear. No deficits   MSK: FROM to b/l UE, weakness to b/l LE    MEDICATIONS  (STANDING):  amphetamine/dextroamphetamine 10 milliGRAM(s) Oral daily  enoxaparin Injectable 40 milliGRAM(s) SubCutaneous every 24 hours  lidocaine   4% Patch 1 Patch Transdermal daily  LORazepam   Injectable 1 milliGRAM(s) IV Push once  riluzole 50 milliGRAM(s) Oral two times a day    MEDICATIONS  (PRN):  acetaminophen  Suppository .. 650 milliGRAM(s) Rectal every 6 hours PRN Temp greater or equal to 38C (100.4F), Mild Pain (1 - 3)  aluminum hydroxide/magnesium hydroxide/simethicone Suspension 30 milliLiter(s) Oral every 4 hours PRN Dyspepsia  bisacodyl Suppository 10 milliGRAM(s) Rectal daily PRN Constipation  glycopyrrolate Injectable 0.2 milliGRAM(s) IV Push every 6 hours PRN secretions  LORazepam   Injectable 0.5 milliGRAM(s) IV Push every 4 hours PRN Agitation  morphine  - Injectable 2 milliGRAM(s) IV Push every 3 hours PRN pain or dyspnea  morphine  - Injectable 2 milliGRAM(s) IV Push every 1 hour PRN air hunger or pain  ondansetron Injectable 4 milliGRAM(s) IV Push every 8 hours PRN Nausea and/or Vomiting        Assessment and Plan:  63 year old female w  ALS, wheelchair bound, HLD  presents w 3 days of intermittent HA found to have progressive ALS with acute on chronic respiratory failure.    #Acute toxic-metabolic encephalopathy due to Acute on chronic hypercarbic/hypoxic respiratory failure and progressive ALS: end stage   -hospice care  -pain management  -focus on comfort  -no vitals  -palliative care following for supportive care  -NPO due to dysphagia    DISPO:   -Hospice care, may be too unstable to discharge to inpatient hospice at this time.

## 2024-05-10 NOTE — PROGRESS NOTE ADULT - CONVERSATION DETAILS
Met with daughterCarrie in the caregiver center to discuss GOC and further determine plans.  Christine, primary health care agent involved via telephone.  We discussed Pts comfort and pain medications in details.  Pt currently on bipap.  Family shared the concern that Pt was unable to come off the bipap this morning as she was struggling to breath and desating quickly.  They do not wish to attempt to ween off bipap until Pts daughter returns from college.  Pts daughter to return from college later on this evening.  Family shared they desire Pt to be comfortable and pain free, we discussed pre medicating Pt prior to removal of bipap, if able.  Pt currently receiving IV morphine.  We discussed in the event she is stable off bipap the option to transfer to inpatient hospice, which they are in agreement with.  Doug desire family to be present when Pt comes off bipap.  Family aware her condition can continue to worsen at any time.  Families feelings explored.  Support provided.    Plan to remove bipap likely tomorrow when Pts daughter returns from college.  Daughter finishing her final this date and will be returning home this evening.  Family aware of palliative team availability.  Emotional support provided.  Our team to continue to follow.
Met with family to further discuss GOC and determine plans.  Plan to remove the bipap this morning, Pts pain medications discussed in details and all questions addressed.  Family desire Pt to be comfortable and pain free.  We discussed in the event Pt is stable for transfer inpatient hospice.  Family desires a referral to [facility] for inpatient hospice in the event Pt is able to transfer.  Families feelings explored.  Support provided.      Family in agreement with plan to remove bipap at 11 am this morning.  Medications discussed in details.  Family aware of palliative team availability.  Comfort measures in place.  Referral to be made for inpatient hospice at [facility] in the event Pt is stable for transfer.  Emotional support provided.  Our team to continue to follow.
Palliative team met with Pt and friend, Christine (health care agent) at the bedside to discuss GOC, assist with planning and provide supportive counseling.  Palliative role explained.  Emotional support provided.  Pt is more alert today.  Appeared alert and oriented, able to make needs known.  Pt shared that she feels she has been declining and has a poor quality of life due to her ALS and pain.  She shared she does not wish to return back to [facility] but is hoping to transfer to [facility].  Pts feelings explored in details.  Support provided.    Pt inquired about changing the focus to comfort at this time.  We discussed the option to change the focus to comfort and update Pts MOLST to reflect her wishes.  MOLST completed to reflect DNR/DNI, trial NIV, send to the hospital only if pain can not be controlled, comfort measures, no feeding tube, no dialysis, determine use of IV fluids and determine use of antibiotics, no routine blood work but only if the need arises.  Pt expressed her daughters will be returning home from college in about 10 days, her goal was to be with them over the summer however due to her pain at this time she has decided that she wants to change the focus to comfort at this time.  Pt desires some medical interventions that could be done at SNF but does not want aggressive medical interventions.  Pt. desires all of her medical care with the goal of keeping her pain free and comfortable but desires IV fluids, antibiotics and blood work to be determined on a case by case basis as she reports if she has an infections she desires such interventions if they are appropriate.    We discussed the option to enroll in the support of hospice in the future if she desires to do so at her SNF once her medicaid is converted from community to chronic medicaid.   Pt shared she is overwhelmed with information regarding insurance therefore this information was discussed with health care agent as per Pt request.      HCP completed to name Christine (friend) as primary agent & Carrie (daughter) as alternate agent.  The original HCP given to Pt and health care agent and copy placed on the medical record.    Plan for [facility], Pt desires [facility] at this time.  MOLST completed to reflect DNR/DNI, trial NIV, send to the hospital only if pain can not be controlled, comfort measures, no feeding tube, no dialysis, determine use of IV fluids and determine use of antibiotics.  Emotional support provided.  Our team to continue to follow.
Received call from 5E as patient's daughter requesting to speak with palliative.    Spoke with patient's daughter Carrie who is currently in college at [employer]. She was calling with questions regarding outpatient palliative care facilities as they are trying to coordinate to get their mother to a facility that can provide a higher level of care. I explained to daughter that there are no specific long-term palliative care facilities. She may benefit more from a specific respiratory care unit in a nursing home if she qualifies. I briefly explained hospice facilities should patient decide she no longer wishes to pursue treatments and wants to focus on comfort/ symptom management only. Daughter request I reach out to patient's sister-in-law, Christine, to discuss further.    Spoke with Christine via phone who was requesting the same information as above regarding palliative care facilities. The above conversation was had with Christine as well. I explained hospice philosophy and services provided, and that hospice care could be provided in inpatient unit, if patient qualified, or nursing home/ SNF. Christine explained that if we talked to patient now regarding hospice she would probably agree as she is suffering from the pain she is in, but does not feel that she is truly ready for hospice at this point. Given the conversation I had with patient Tuesday I would also agree that patient was not quite ready for hospice although we will continue to discuss with both patient and family.    Christine is point of contact for patient as patient's daughters are both in college and are overwhelmed with conversations/ any decision making. Carrie confirmed that Christine should be point of contact for patient.    Palliative will continue to follow.

## 2024-05-14 DIAGNOSIS — G43.919 MIGRAINE, UNSPECIFIED, INTRACTABLE, WITHOUT STATUS MIGRAINOSUS: ICD-10-CM

## 2024-05-14 DIAGNOSIS — T50.905A ADVERSE EFFECT OF UNSPECIFIED DRUGS, MEDICAMENTS AND BIOLOGICAL SUBSTANCES, INITIAL ENCOUNTER: ICD-10-CM

## 2024-05-14 DIAGNOSIS — Z87.891 PERSONAL HISTORY OF NICOTINE DEPENDENCE: ICD-10-CM

## 2024-05-14 DIAGNOSIS — J96.21 ACUTE AND CHRONIC RESPIRATORY FAILURE WITH HYPOXIA: ICD-10-CM

## 2024-05-14 DIAGNOSIS — F41.9 ANXIETY DISORDER, UNSPECIFIED: ICD-10-CM

## 2024-05-14 DIAGNOSIS — G92.8 OTHER TOXIC ENCEPHALOPATHY: ICD-10-CM

## 2024-05-14 DIAGNOSIS — Z91.198 PATIENT'S NONCOMPLIANCE WITH OTHER MEDICAL TREATMENT AND REGIMEN FOR OTHER REASON: ICD-10-CM

## 2024-05-14 DIAGNOSIS — Z99.3 DEPENDENCE ON WHEELCHAIR: ICD-10-CM

## 2024-05-14 DIAGNOSIS — Z66 DO NOT RESUSCITATE: ICD-10-CM

## 2024-05-14 DIAGNOSIS — E78.5 HYPERLIPIDEMIA, UNSPECIFIED: ICD-10-CM

## 2024-05-14 DIAGNOSIS — J96.22 ACUTE AND CHRONIC RESPIRATORY FAILURE WITH HYPERCAPNIA: ICD-10-CM

## 2024-05-14 DIAGNOSIS — R13.10 DYSPHAGIA, UNSPECIFIED: ICD-10-CM

## 2024-05-14 DIAGNOSIS — R32 UNSPECIFIED URINARY INCONTINENCE: ICD-10-CM

## 2024-05-14 DIAGNOSIS — F33.9 MAJOR DEPRESSIVE DISORDER, RECURRENT, UNSPECIFIED: ICD-10-CM

## 2024-05-14 DIAGNOSIS — E87.6 HYPOKALEMIA: ICD-10-CM

## 2024-05-14 DIAGNOSIS — Z51.5 ENCOUNTER FOR PALLIATIVE CARE: ICD-10-CM

## 2024-05-14 DIAGNOSIS — G12.21 AMYOTROPHIC LATERAL SCLEROSIS: ICD-10-CM

## 2024-05-14 DIAGNOSIS — K59.00 CONSTIPATION, UNSPECIFIED: ICD-10-CM

## 2024-05-14 DIAGNOSIS — E83.39 OTHER DISORDERS OF PHOSPHORUS METABOLISM: ICD-10-CM

## 2024-05-14 DIAGNOSIS — E43 UNSPECIFIED SEVERE PROTEIN-CALORIE MALNUTRITION: ICD-10-CM

## 2025-06-03 NOTE — ED ADULT TRIAGE NOTE - ESI TRIAGE ACUITY LEVEL, MLM
Nutrition Assessment   Reason for consult/assessment: Initial, ([facility]) Nursing nutrition screen, RD evaluation    Diagnosis, Labs, Medication, History: Reviewed    Pertinent nutrition history prior to admission: Patient admitted for acute cystitis. Past medical history significant for NIDDM-2, HTN, chronic constipation, PUD/GERD, osteoporosis, VLADIMIR, and morbid obesity.    Pertinent nutrition information pertaining to hospital course: Patient reported decreased po intake on admission.                                Oral diet order: Cardiac, Consistent carbohydrate, 2gm sodium (low sodium)                      Food allergies: None known    Anthropometrics Information  Wt Readings from Last 1 Encounters:   06/02/25 101.8 kg           % Weight change: weight appears stable x1 year.          Estimated Needs  Estimated needs: not applicable at this time per clinical judgement                                         NFPE  Nutrition Focused Physical Exam  Physical Exam Completed: No, not appropriate at this time (06/03/25 1347 : Leslie Campbell, BRITTANY)                         Treatment Plan/Interventions                                                                                         Coordination of nutrition care: Patient is consuming 100% of meals. Will continue to monitor and provide additional nutrition recommendations as needed.              Goals & Monitoring  Intervention: Coordination of nutrition care by a nutrition professional    Goal: Tolerate oral diet  Intervention goal status: Initiated  Time frame to achieve goal: 5-7 days    Dietitian will monitor: Biochemical data, medical tests, procedures, Food, beverage, and nutrient intake          Nutrition Diagnosis/PES   Nutrition Diagnosis: Predicted suboptimal energy intake     Related to: Poor appetite  As evidenced by: Documented/reported poor oral intake     Primary nutrition diagnosis status: New nutrition diagnosis                 2